# Patient Record
Sex: MALE | Race: WHITE | NOT HISPANIC OR LATINO | Employment: UNEMPLOYED | URBAN - METROPOLITAN AREA
[De-identification: names, ages, dates, MRNs, and addresses within clinical notes are randomized per-mention and may not be internally consistent; named-entity substitution may affect disease eponyms.]

---

## 2017-01-11 NOTE — PROGRESS NOTES
"  SUBJECTIVE:                                                    Dominic Workman is a 21 year old male who presents to clinic today for the following health issues:    Abnormal Mood Symptoms      Duration: 6-7 months ago    Description:  Depression: YES  Anxiety: YES  Panic attacks: no     Accompanying signs and symptoms: see PHQ-9 and ZOILA scores    History (similar episodes/previous evaluation): None    Precipitating or alleviating factors: None    Therapies tried and outcome: none     Patient is a senior at the [employer] with biology and Rusion major.  Mood symptom started in wilmer year in high school, but intensified in the past year.  His mothers are  and he has a twin sister who goes to [employer].  His stepmother passed away this past March and he was close with her.  Symptoms currently include apathy, less interest, intense sadness, low appetite, sleeping often , feeling hopeless.  Some anxiety symptoms, but manageable and focal around school and having a \"life plan.\"  Did have SI a couple months ago and would often go to [facility] with vague thoughts of being able to jump, but never intentions to jump and states that he \"knows deep down [I] would never do that.\"  He does not have this SI currently.  No feelings of grandiosity, needing less sleep, acting out of character, speaking more.  He lives in a house with 10 roommates and this is positive for him.  Family moved from [place] to the [facility] and he spends time with family - this has been positive.  Not much alcohol use, but does smoke 2-3 bowls of marijuana 3-4 times a week.      He started seeing a therapist - Cheikh Delgadillo at [facility] in [place] - 3 months ago and sees him every other week.  Therapist and he co-developed a plan to look into medication.  His sister and one mother both have depression and he knows that his sister has had good effect with prozac.  He is worried about side effects of weight gain and sexual " "side effects.      Questions/Concerns: none      Problem list and histories reviewed & adjusted, as indicated.  Additional history: as documented    Problem list, Medication list, Allergies, and Medical/Social/Surgical histories reviewed in [facility] and updated as appropriate.    ROS:  Constitutional, HEENT, cardiovascular, pulmonary, gi and gu systems are negative, except as otherwise noted.    OBJECTIVE:                                                    /61 mmHg  Pulse 70  Temp(Src) 96.3  F (35.7  C) (Oral)  Ht 5' 10.08\" (1.78 m)  Wt 181 lb 4.8 oz (82.237 kg)  BMI 25.96 kg/m2  SpO2 97%  Body mass index is 25.96 kg/(m^2).  GENERAL: healthy, alert and no distress  EYES: Eyes grossly normal to inspection, PERRL and conjunctivae and sclerae normal  HENT: ear canals and TM's normal, nose and mouth without ulcers or lesions  NECK: no adenopathy, no asymmetry, masses, or scars and thyroid normal to palpation  RESP: lungs clear to auscultation - no rales, rhonchi or wheezes  CV: regular rate and rhythm, normal S1 S2, no S3 or S4, no murmur, click or rub, no peripheral edema and peripheral pulses strong  ABDOMEN: soft, nontender, no hepatosplenomegaly, no masses and bowel sounds normal  NEURO: Normal strength and tone, mentation intact and speech normal  PSYCH: mentation appears normal, affect normal/bright    Diagnostic Test Results:  none    Please note greater than 50% of this 30 minute appointment were spent in counseling with the patient of the issues described above in the history of present illness and in the plan, including depression treatment options including medication, counseling, exercise, diet changes, meditation and supportive community, as well as side effects of medications.  ASSESSMENT/PLAN:                                                    Depression; recurrent episode-- Moderate   Associated with the following complications:    None   Plan:  Medications:   SSRI - initiate " SSRI  Referrals/Other:  Encourage regular exercise and Counseling recommended      (F33.1) Major depressive disorder, recurrent episode, moderate (H)  (primary encounter diagnosis)  Comment:   Plan: FLUoxetine (PROZAC) 20 MG capsule        Discussed medication options - worried about sexual side effects.  Depression and anhedonia much bigger factor than anxiety, so feel that the activating aspect of prozac will be more helpful.  Vitamin D, regular exercise, continue therapy.  Recheck in 4 weeks    (Z11.3) Routine screening for STI (sexually transmitted infection)  Comment:   Plan: Anti Treponema, Neisseria gonorrhoeae PCR,         Chlamydia trachomatis PCR, HIV Antigen Antibody        Combo              See Patient Instructions    ZARA Brand [facility]

## 2017-01-12 ENCOUNTER — OFFICE VISIT (OUTPATIENT)
Dept: FAMILY MEDICINE | Facility: CLINIC | Age: 22
End: 2017-01-12
Payer: COMMERCIAL

## 2017-01-12 VITALS
HEART RATE: 70 BPM | TEMPERATURE: 96.3 F | SYSTOLIC BLOOD PRESSURE: 146 MMHG | OXYGEN SATURATION: 97 % | DIASTOLIC BLOOD PRESSURE: 61 MMHG | WEIGHT: 181.3 LBS | HEIGHT: 70 IN | BODY MASS INDEX: 25.96 KG/M2

## 2017-01-12 DIAGNOSIS — Z11.3 ROUTINE SCREENING FOR STI (SEXUALLY TRANSMITTED INFECTION): ICD-10-CM

## 2017-01-12 DIAGNOSIS — F33.1 MAJOR DEPRESSIVE DISORDER, RECURRENT EPISODE, MODERATE (H): Primary | ICD-10-CM

## 2017-01-12 PROCEDURE — 99203 OFFICE O/P NEW LOW 30 MIN: CPT | Performed by: NURSE PRACTITIONER

## 2017-01-12 PROCEDURE — 36415 COLL VENOUS BLD VENIPUNCTURE: CPT | Performed by: NURSE PRACTITIONER

## 2017-01-12 PROCEDURE — 87491 CHLMYD TRACH DNA AMP PROBE: CPT | Performed by: NURSE PRACTITIONER

## 2017-01-12 PROCEDURE — 87591 N.GONORRHOEAE DNA AMP PROB: CPT | Performed by: NURSE PRACTITIONER

## 2017-01-12 PROCEDURE — 86780 TREPONEMA PALLIDUM: CPT | Performed by: NURSE PRACTITIONER

## 2017-01-12 PROCEDURE — 87389 HIV-1 AG W/HIV-1&-2 AB AG IA: CPT | Performed by: NURSE PRACTITIONER

## 2017-01-12 ASSESSMENT — ANXIETY QUESTIONNAIRES
2. NOT BEING ABLE TO STOP OR CONTROL WORRYING: SEVERAL DAYS
GAD7 TOTAL SCORE: 4
7. FEELING AFRAID AS IF SOMETHING AWFUL MIGHT HAPPEN: SEVERAL DAYS
3. WORRYING TOO MUCH ABOUT DIFFERENT THINGS: SEVERAL DAYS
6. BECOMING EASILY ANNOYED OR IRRITABLE: NOT AT ALL
IF YOU CHECKED OFF ANY PROBLEMS ON THIS QUESTIONNAIRE, HOW DIFFICULT HAVE THESE PROBLEMS MADE IT FOR YOU TO DO YOUR WORK, TAKE CARE OF THINGS AT HOME, OR GET ALONG WITH OTHER PEOPLE: SOMEWHAT DIFFICULT
5. BEING SO RESTLESS THAT IT IS HARD TO SIT STILL: NOT AT ALL
1. FEELING NERVOUS, ANXIOUS, OR ON EDGE: SEVERAL DAYS

## 2017-01-12 ASSESSMENT — PATIENT HEALTH QUESTIONNAIRE - PHQ9: 5. POOR APPETITE OR OVEREATING: NOT AT ALL

## 2017-01-12 NOTE — MR AVS SNAPSHOT
After Visit Summary   1/12/2017    Dominic Workman    MRN: [id number]           Patient Information     Date Of Birth          1995        Visit Information        Provider Department      1/12/2017 2:30 PM Julia Maurer APRN [facility]        Today's Diagnoses     Major depressive disorder, recurrent episode, moderate (H)    -  1     Routine screening for STI (sexually transmitted infection)           Care Instructions    Plan for depression and anxiety :    It is important to take care of yourself so you can heal and feel better.    - eat a balanced diet with lots of fruits, vegetables, protein, and whole grains   (organic best).  Try decreasing or avoiding sugars, trans fats, and white flour  -consider these supplements daily:    multiple vitamins or B complex with at least 50 mg of B6,    fish oil 1000 mg twice daily or 2 TBS ground flax seed meal    Vit D3 1887-2179 IU     Probiotics (healthy bacteria) especially if stomach or bowel symptoms   - exercise regularly with at least 30 minutes of movement daily or 5 hours per week  - sleep at least 8 hours per night   -practice meditation or relaxation daily   Yoga, Igor Chi, Qi Gong, sitting or walking meditation or   whatever you do that is meditative--that which empties the mind of worry   Sewing, reading, cooking, gardening, fishing, praying, etc  -consider starting or continuing in counseling or therapy   Contact your health insurance for resources  -seek support from friends and family and emmett communities   -other alternative therapies may also be of help such as acupuncture, homeopathy,  traditional chinese medicine, massage, etc      In addition to the above important treatments, antidepressant meds may be prescribed.   Drugs of the SSRI class can have side effects such as weight gain, sexual dysfunction, insomnia, headache, nausea, and initially increases in anxiety.  Let me know if significant side effects do  "occur.    Follow up in clinic in 4 weeks for recheck; sooner if symptoms should worsen.        Resources/Books:  Unstuck by Atif Mcgovern     The Chemistry of Anali  by Jaxon Ramirez     The Chemistry of Calm  by Jaxon Ramirez     Total Catastrophe Living by Aki Turner    Mindfulness-Based Stress Reduction Classes:    [facility]assionate [facility]  [email or web address]  [phone]    [facility]  [email or web address]    [email or web address]  [email or web address]    [facility] [place]  [email or web address]                Follow-ups after your visit        Who to contact     If you have questions or need follow up information about today's clinic visit or your schedule please contact [facility] directly at [phone].  Normal or non-critical lab and imaging results will be communicated to you by EngTechNowhart, letter or phone within 4 business days after the clinic has received the results. If you do not hear from us within 7 days, please contact the clinic through EngTechNowhart or phone. If you have a critical or abnormal lab result, we will notify you by phone as soon as possible.  Submit refill requests through BinWise or call your pharmacy and they will forward the refill request to us. Please allow 3 business days for your refill to be completed.          Additional Information About Your Visit        BinWise Information     BinWise lets you send messages to your doctor, view your test results, renew your prescriptions, schedule appointments and more. To sign up, go to [email or web address] . Click on \"Log in\" on the left side of the screen, which will take you to the Welcome page. Then click on \"Sign up Now\" on the right side of the page.     You will be asked to enter the access code listed below, as well as some personal information. Please follow the directions to create your username and password.     Your access code is: " "[email or web address]  Expires: 2017  3:27 PM     Your access code will  in 90 days. If you need help or a new code, please call your [facility] clinic or [phone].        Care EveryWhere ID     This is your Care EveryWhere ID. This could be used by other organizations to access your [facility] medical records  [id number]        Your Vitals Were     Pulse Temperature Height BMI (Body Mass Index) Pulse Oximetry       70 96.3  F (35.7  C) (Oral) 5' 10.08\" (1.78 m) 25.96 kg/m2 97%        Blood Pressure from Last 3 Encounters:   17 146/61    Weight from Last 3 Encounters:   17 181 lb 4.8 oz (82.237 kg)              We Performed the Following     Anti Treponema     Chlamydia trachomatis PCR     HIV Antigen Antibody Combo     Neisseria gonorrhoeae PCR          Today's Medication Changes          These changes are accurate as of: 17  3:27 PM.  If you have any questions, ask your nurse or doctor.               Start taking these medicines.        Dose/Directions    FLUoxetine 20 MG capsule   Commonly known as:  PROzac   Used for:  Major depressive disorder, recurrent episode, moderate (H)        Dose:  20 mg   Take 1 capsule (20 mg) by mouth daily   Quantity:  45 capsule   Refills:  1            Where to get your medicines      These medications were sent to [facility] [place], [place] - [address]  [address] [address]     Phone:  [phone]    - FLUoxetine 20 MG capsule             Primary Care Provider    None Specified       No primary provider on file.        Thank you!     Thank you for choosing [facility]  for your care. Our goal is always to provide you with excellent care. Hearing back from our patients is one way we can continue to improve our services. Please take a few minutes to complete the written survey that you may receive in the mail after your visit with us. Thank you!             Your Updated Medication List - Protect " others around you: Learn how to safely use, store and throw away your medicines at www.disposemymeds.org.          This list is accurate as of: 1/12/17  3:27 PM.  Always use your most recent med list.                   Brand Name Dispense Instructions for use    FLUoxetine 20 MG capsule    PROzac    45 capsule    Take 1 capsule (20 mg) by mouth daily

## 2017-01-12 NOTE — NURSING NOTE
"Chief Complaint   Patient presents with     Mental Health Problem       Initial /61 mmHg  Pulse 70  Temp(Src) 96.3  F (35.7  C) (Oral)  Ht 5' 10.08\" (1.78 m)  Wt 181 lb 4.8 oz (82.237 kg)  BMI 25.96 kg/m2  SpO2 97% Estimated body mass index is 25.96 kg/(m^2) as calculated from the following:    Height as of this encounter: 5' 10.08\" (1.78 m).    Weight as of this encounter: 181 lb 4.8 oz (82.237 kg).  BP completed using cuff size: darlin Sorenson MA      "

## 2017-01-12 NOTE — PATIENT INSTRUCTIONS
Plan for depression and anxiety :    It is important to take care of yourself so you can heal and feel better.    - eat a balanced diet with lots of fruits, vegetables, protein, and whole grains   (organic best).  Try decreasing or avoiding sugars, trans fats, and white flour  -consider these supplements daily:    multiple vitamins or B complex with at least 50 mg of B6,    fish oil 1000 mg twice daily or 2 TBS ground flax seed meal    Vit D3 4542-1991 IU     Probiotics (healthy bacteria) especially if stomach or bowel symptoms   - exercise regularly with at least 30 minutes of movement daily or 5 hours per week  - sleep at least 8 hours per night   -practice meditation or relaxation daily   Yoga, Igor Chi, Qi Gong, sitting or walking meditation or   whatever you do that is meditative--that which empties the mind of worry   Sewing, reading, cooking, gardening, fishing, praying, etc  -consider starting or continuing in counseling or therapy   Contact your health insurance for resources  -seek support from friends and family and emmett communities   -other alternative therapies may also be of help such as acupuncture, homeopathy,  traditional chinese medicine, massage, etc      In addition to the above important treatments, antidepressant meds may be prescribed.   Drugs of the SSRI class can have side effects such as weight gain, sexual dysfunction, insomnia, headache, nausea, and initially increases in anxiety.  Let me know if significant side effects do occur.    Follow up in clinic in 4 weeks for recheck; sooner if symptoms should worsen.        Resources/Books:  Unstuck by Atif Mcgovern     The Chemistry of Anali  by Jaxon Ramirez     The Chemistry of Calm  by Jaxon Ramirez     Total Catastrophe Living by Aki Turner    Mindfulness-Based Stress Reduction Classes:    [facility]ionate [facility]  [email or web address]  [phone]    [facility]  [email or web address]    [facility]  [email or web address]    [facility] [place]  [email or web address]

## 2017-01-13 LAB
HIV 1+2 AB+HIV1 P24 AG SERPL QL IA: NORMAL
T PALLIDUM IGG+IGM SER QL: NEGATIVE

## 2017-01-13 ASSESSMENT — PATIENT HEALTH QUESTIONNAIRE - PHQ9: SUM OF ALL RESPONSES TO PHQ QUESTIONS 1-9: 15

## 2017-01-13 ASSESSMENT — ANXIETY QUESTIONNAIRES: GAD7 TOTAL SCORE: 4

## 2017-01-15 LAB
C TRACH DNA SPEC QL NAA+PROBE: NORMAL
N GONORRHOEA DNA SPEC QL NAA+PROBE: NORMAL
SPECIMEN SOURCE: NORMAL
SPECIMEN SOURCE: NORMAL

## 2017-01-17 NOTE — PROGRESS NOTES
Quick Note:    Dominic,    Your labs were all normal. If you have any questions, please feel free to contact the clinic.    JACKIE Steinberg  ______

## 2017-02-14 NOTE — PROGRESS NOTES
SUBJECTIVE:                                                    Dominic Workman is a 21 year old male who presents to clinic today for the following health issues:    Depression and Anxiety Follow-Up    Status since last visit: better initially, then worsened    Other associated symptoms: trouble sleeping, more emotional, increase in migraines    Therapy once every two weeks    Complicating factors:     Significant life event: No     Current substance abuse: None    PHQ-9 SCORE 1/12/2017 2/15/2017   Total Score 15 17     ZOILA-7 SCORE 1/12/2017 2/15/2017   Total Score 4 4        PHQ-9  English      PHQ-9   Any Language     GAD7       Amount of exercise or physical activity: 2-3 days/week for an average of greater than 60 minutes    Problems taking medications regularly: No    Medication side effects: migraines    Diet: regular (no restrictions)      Problem list and histories reviewed & adjusted, as indicated.  Additional history: as documented    Problem list, Medication list, Allergies, and Medical/Social/Surgical histories reviewed in [facility] and updated as appropriate.    ROS:  Constitutional, HEENT, cardiovascular, pulmonary, gi and gu systems are negative, except as otherwise noted.    OBJECTIVE:                                                    /79 (BP Location: Left arm, Patient Position: Chair, Cuff Size: Adult Regular)  Pulse 78  Temp 98.3  F (36.8  C) (Oral)  Wt 172 lb (78 kg)  SpO2 96%  BMI 24.62 kg/m2  Body mass index is 24.62 kg/(m^2).  GENERAL: healthy, alert and no distress  NECK: no adenopathy, no asymmetry, masses, or scars and thyroid normal to palpation  RESP: lungs clear to auscultation - no rales, rhonchi or wheezes  CV: regular rate and rhythm, normal S1 S2, no S3 or S4, no murmur, click or rub, no peripheral edema and peripheral pulses strong  ABDOMEN: soft, nontender, no hepatosplenomegaly, no masses and bowel sounds normal  NEURO: Normal strength and tone, mentation intact and speech  normal  PSYCH: mentation appears normal, affect normal/bright    Diagnostic Test Results:  none      ASSESSMENT/PLAN:                                                    Depression; recurrent episode-- Moderate   Associated with the following complications:    None   Plan:  Medications:   SSRI - change SSRI      (F33.1) Major depressive disorder, recurrent episode, moderate (H)  (primary encounter diagnosis)  Comment:   Plan: citalopram (CELEXA) 20 MG tablet              Patient Instructions   Stop prozac  Start celexa (citalopram) 10 mg, then up to 20 mg in one week  Return in one month  Continue therapy  Increase exercise to once a day  Continue vitamin D      ZARA Brand [facility]

## 2017-02-15 ENCOUNTER — OFFICE VISIT (OUTPATIENT)
Dept: FAMILY MEDICINE | Facility: CLINIC | Age: 22
End: 2017-02-15
Payer: COMMERCIAL

## 2017-02-15 VITALS
SYSTOLIC BLOOD PRESSURE: 131 MMHG | DIASTOLIC BLOOD PRESSURE: 79 MMHG | BODY MASS INDEX: 24.62 KG/M2 | WEIGHT: 172 LBS | TEMPERATURE: 98.3 F | OXYGEN SATURATION: 96 % | HEART RATE: 78 BPM

## 2017-02-15 DIAGNOSIS — F33.1 MAJOR DEPRESSIVE DISORDER, RECURRENT EPISODE, MODERATE (H): Primary | ICD-10-CM

## 2017-02-15 PROCEDURE — 99214 OFFICE O/P EST MOD 30 MIN: CPT | Performed by: NURSE PRACTITIONER

## 2017-02-15 RX ORDER — CITALOPRAM HYDROBROMIDE 20 MG/1
TABLET ORAL
Qty: 60 TABLET | Refills: 0 | Status: SHIPPED | OUTPATIENT
Start: 2017-02-15 | End: 2017-12-22

## 2017-02-15 ASSESSMENT — ANXIETY QUESTIONNAIRES
7. FEELING AFRAID AS IF SOMETHING AWFUL MIGHT HAPPEN: NOT AT ALL
1. FEELING NERVOUS, ANXIOUS, OR ON EDGE: SEVERAL DAYS
GAD7 TOTAL SCORE: 4
5. BEING SO RESTLESS THAT IT IS HARD TO SIT STILL: NOT AT ALL
3. WORRYING TOO MUCH ABOUT DIFFERENT THINGS: SEVERAL DAYS
2. NOT BEING ABLE TO STOP OR CONTROL WORRYING: SEVERAL DAYS
6. BECOMING EASILY ANNOYED OR IRRITABLE: SEVERAL DAYS

## 2017-02-15 ASSESSMENT — PATIENT HEALTH QUESTIONNAIRE - PHQ9: 5. POOR APPETITE OR OVEREATING: NOT AT ALL

## 2017-02-15 NOTE — MR AVS SNAPSHOT
After Visit Summary   2/15/2017    Dominic Workman    MRN: [id number]           Patient Information     Date Of Birth          1995        Visit Information        Provider Department      2/15/2017 4:00 PM Julia Maurer APRN CNP [facility]        Today's Diagnoses     Major depressive disorder, recurrent episode, moderate (H)    -  1      Care Instructions    Stop prozac  Start celexa (citalopram) 10 mg, then up to 20 mg in one week  Return in one month  Continue therapy  Increase exercise to once a day  Continue vitamin D        Follow-ups after your visit        Who to contact     If you have questions or need follow up information about today's clinic visit or your schedule please contact [facility] directly at [phone].  Normal or non-critical lab and imaging results will be communicated to you by DigitalOceanhart, letter or phone within 4 business days after the clinic has received the results. If you do not hear from us within 7 days, please contact the clinic through DigitalOceanhart or phone. If you have a critical or abnormal lab result, we will notify you by phone as soon as possible.  Submit refill requests through NantWorks or call your pharmacy and they will forward the refill request to us. Please allow 3 business days for your refill to be completed.          Additional Information About Your Visit        MyChart Information     NantWorks gives you secure access to your electronic health record. If you see a primary care provider, you can also send messages to your care team and make appointments. If you have questions, please call your primary care clinic.  If you do not have a primary care provider, please call [phone] and they will assist you.        Care EveryWhere ID     This is your Care EveryWhere ID. This could be used by other organizations to access your [facility] medical records  [id number]        Your Vitals Were     Pulse Temperature Pulse  Oximetry BMI (Body Mass Index)          78 98.3  F (36.8  C) (Oral) 96% 24.62 kg/m2         Blood Pressure from Last 3 Encounters:   02/15/17 131/79   01/12/17 146/61    Weight from Last 3 Encounters:   02/15/17 172 lb (78 kg)   01/12/17 181 lb 4.8 oz (82.2 kg)              Today, you had the following     No orders found for display         Today's Medication Changes          These changes are accurate as of: 2/15/17  5:06 PM.  If you have any questions, ask your nurse or doctor.               Start taking these medicines.        Dose/Directions    citalopram 20 MG tablet   Commonly known as:  celeXA   Used for:  Major depressive disorder, recurrent episode, moderate (H)   Started by:  Julia Maurer APRN CNP        Take 1/2 tablet (10 mg) for 1 week, then increase to 1 tablet orally daily   Quantity:  60 tablet   Refills:  0         Stop taking these medicines if you haven't already. Please contact your care team if you have questions.     FLUoxetine 20 MG capsule   Commonly known as:  PROzac   Stopped by:  Julia Maurer APRN CNP                Where to get your medicines      These medications were sent to [facility] [place], [place] - [address]  [address] [address]     Phone:  [phone]     citalopram 20 MG tablet                Primary Care Provider    None Specified       No primary provider on file.        Thank you!     Thank you for choosing [facility]  for your care. Our goal is always to provide you with excellent care. Hearing back from our patients is one way we can continue to improve our services. Please take a few minutes to complete the written survey that you may receive in the mail after your visit with us. Thank you!             Your Updated Medication List - Protect others around you: Learn how to safely use, store and throw away your medicines at www.disposemymeds.org.          This list is accurate as of: 2/15/17  5:06 PM.   Always use your most recent med list.                   Brand Name Dispense Instructions for use    citalopram 20 MG tablet    celeXA    60 tablet    Take 1/2 tablet (10 mg) for 1 week, then increase to 1 tablet orally daily

## 2017-02-15 NOTE — NURSING NOTE
"Chief Complaint   Patient presents with     Anxiety     Depression       Initial /79 (BP Location: Left arm, Patient Position: Chair, Cuff Size: Adult Regular)  Pulse 78  Temp 98.3  F (36.8  C) (Oral)  Wt 172 lb (78 kg)  SpO2 96%  BMI 24.62 kg/m2 Estimated body mass index is 24.62 kg/(m^2) as calculated from the following:    Height as of 1/12/17: 5' 10.08\" (1.78 m).    Weight as of this encounter: 172 lb (78 kg).    Yun Gunter MA      "

## 2017-02-15 NOTE — PATIENT INSTRUCTIONS
Stop prozac  Start celexa (citalopram) 10 mg, then up to 20 mg in one week  Return in one month  Continue therapy  Increase exercise to once a day  Continue vitamin D

## 2017-02-16 ASSESSMENT — PATIENT HEALTH QUESTIONNAIRE - PHQ9: SUM OF ALL RESPONSES TO PHQ QUESTIONS 1-9: 17

## 2017-02-16 ASSESSMENT — ANXIETY QUESTIONNAIRES: GAD7 TOTAL SCORE: 4

## 2017-08-14 ENCOUNTER — TELEPHONE (OUTPATIENT)
Dept: FAMILY MEDICINE | Facility: CLINIC | Age: 22
End: 2017-08-14

## 2017-08-14 NOTE — LETTER
Dominic Workman  [address] [place] [address]        August 14, 2017          Dear Dominic,    In order to ensure we are providing the best quality care, we have reviewed your chart and see that you are due for:    1. Depression follow up    Please call the clinic at your earliest convenience to schedule an appointment.  If you have completed these please contact our office via phone or Mychart to update our records.  We would like to know the date (approximately month and year), the result, and ideally where the procedure was performed.    Thank you for trusting us with your health care.      Sincerely,    Care Team for JACKIE Steinberg

## 2017-08-14 NOTE — TELEPHONE ENCOUNTER
Panel Management Review      Patient has the following on his problem list:     Depression / Dysthymia review  PHQ-9 SCORE 1/12/2017 2/15/2017   Total Score 15 17      Patient is due for:  PHQ9        Composite cancer screening  Chart review shows that this patient is due/due soon for the following None  Summary:    Patient is due/failing the following:   PHQ9    Action needed:   Patient needs office visit for depression follow up.    Type of outreach:    Phone, left message for patient to call back. , Sent NextWidgetst message. and Sent letter.    Questions for provider review:    None                                                                                                                                    Tato Sorenson MA     Chart routed to none.

## 2017-10-18 ENCOUNTER — OFFICE VISIT (OUTPATIENT)
Dept: FAMILY MEDICINE | Facility: CLINIC | Age: 22
End: 2017-10-18
Payer: COMMERCIAL

## 2017-10-18 VITALS
SYSTOLIC BLOOD PRESSURE: 132 MMHG | DIASTOLIC BLOOD PRESSURE: 78 MMHG | BODY MASS INDEX: 26.64 KG/M2 | TEMPERATURE: 97.2 F | HEART RATE: 62 BPM | WEIGHT: 186.1 LBS | OXYGEN SATURATION: 96 %

## 2017-10-18 DIAGNOSIS — F33.1 MAJOR DEPRESSIVE DISORDER, RECURRENT EPISODE, MODERATE (H): Primary | ICD-10-CM

## 2017-10-18 DIAGNOSIS — F41.1 GAD (GENERALIZED ANXIETY DISORDER): ICD-10-CM

## 2017-10-18 PROCEDURE — 99214 OFFICE O/P EST MOD 30 MIN: CPT | Performed by: NURSE PRACTITIONER

## 2017-10-18 ASSESSMENT — ANXIETY QUESTIONNAIRES
GAD7 TOTAL SCORE: 15
7. FEELING AFRAID AS IF SOMETHING AWFUL MIGHT HAPPEN: MORE THAN HALF THE DAYS
3. WORRYING TOO MUCH ABOUT DIFFERENT THINGS: NEARLY EVERY DAY
IF YOU CHECKED OFF ANY PROBLEMS ON THIS QUESTIONNAIRE, HOW DIFFICULT HAVE THESE PROBLEMS MADE IT FOR YOU TO DO YOUR WORK, TAKE CARE OF THINGS AT HOME, OR GET ALONG WITH OTHER PEOPLE: SOMEWHAT DIFFICULT
5. BEING SO RESTLESS THAT IT IS HARD TO SIT STILL: SEVERAL DAYS
6. BECOMING EASILY ANNOYED OR IRRITABLE: SEVERAL DAYS
1. FEELING NERVOUS, ANXIOUS, OR ON EDGE: NEARLY EVERY DAY
2. NOT BEING ABLE TO STOP OR CONTROL WORRYING: NEARLY EVERY DAY

## 2017-10-18 ASSESSMENT — PATIENT HEALTH QUESTIONNAIRE - PHQ9
SUM OF ALL RESPONSES TO PHQ QUESTIONS 1-9: 19
5. POOR APPETITE OR OVEREATING: MORE THAN HALF THE DAYS

## 2017-10-18 NOTE — NURSING NOTE
"Chief Complaint   Patient presents with     Depression     Anxiety       Initial /78  Pulse 62  Temp 97.2  F (36.2  C) (Oral)  Wt 186 lb 1.6 oz (84.4 kg)  SpO2 96%  BMI 26.64 kg/m2 Estimated body mass index is 26.64 kg/(m^2) as calculated from the following:    Height as of 1/12/17: 5' 10.08\" (1.78 m).    Weight as of this encounter: 186 lb 1.6 oz (84.4 kg).  Medication Reconciliation: complete       Tato Sorenson MA      "

## 2017-10-18 NOTE — MR AVS SNAPSHOT
After Visit Summary   10/18/2017    Dominic Workman    MRN: [id number]           Patient Information     Date Of Birth          1995        Visit Information        Provider Department      10/18/2017 4:15 PM Julia Maurer APRN [facility]        Today's Diagnoses     Major depressive disorder, recurrent episode, moderate (H)    -  1    ZOILA (generalized anxiety disorder)          Care Instructions    Start zoloft 25 mg (1/2 tab) for 1-2 weeks, then increase to 5o mg (1 tab)  Increase exercise  Change therapy to weekly for the time being  Follow-up sooner if more intrusive thoughts  Behavior emergency room if suicidal intentions  Return in four weeks    - eat a balanced diet with lots of fruits, vegetables, protein, and whole grains   (organic best).  Try decreasing or avoiding sugars, trans fats, and white flour  -consider these supplements daily:    multiple vitamins or B complex with at least 50 mg of B6,    fish oil 1000 mg twice daily or 2 TBS ground flax seed meal    Vit D3 5589-6822 IU     Probiotics (healthy bacteria) especially if stomach or bowel symptoms   - exercise regularly with at least 30 minutes of movement daily or 5 hours per week  - sleep at least 8 hours per night    If having difficulty getting to sleep, try Magnesium glycinate 400-600 mg or    melatonin 1-3 mg in the evening    If having difficulty staying asleep, try L-theanine 100-200 mg at bedtime or   Passionflower tincture, tea or capsule  -practice meditation or relaxation daily   Yoga, Igor Chi, Qi Gong, sitting or walking meditation or   whatever you do that is meditative--that which empties the mind of worry   Sewing, reading, cooking, gardening, fishing, praying, etc  -consider starting or continuing in counseling or therapy   Contact your health insurance for resources  -seek support from friends and family and emmett communities   -other alternative therapies may also be of help such as  acupuncture, homeopathy,  traditional chinese medicine, massage, etc      In addition to the above important treatments, antidepressant meds may be prescribed.   Drugs of the SSRI class can have side effects such as weight gain, sexual dysfunction, insomnia, headache, nausea, and initially increases in anxiety.  Let me know if significant side effects do occur.    Follow up in clinic in 4 weeks for recheck; sooner if symptoms should worsen.        Resources/Books:  Unstuck by Atif Mcgovern     The Chemistry of Anali  by Jaxon Ramirez     The Chemistry of Calm  by Jaxon Ramirez     Total Catastrophe Living by Aki Turner    Mindfulness-Based Stress Reduction Classes:    [facility] [place] [facility] [place]  [email or web address]  [phone]    [facility]  [email or web address]    [facility]  [email or web address]    [facility] [place]  [email or web address]                  Follow-ups after your visit        Who to contact     If you have questions or need follow up information about today's clinic visit or your schedule please contact [facility] directly at [phone].  Normal or non-critical lab and imaging results will be communicated to you by Zopahart, letter or phone within 4 business days after the clinic has received the results. If you do not hear from us within 7 days, please contact the clinic through DoublePlay Entertainmentt or phone. If you have a critical or abnormal lab result, we will notify you by phone as soon as possible.  Submit refill requests through [employer] or call your pharmacy and they will forward the refill request to us. Please allow 3 business days for your refill to be completed.          Additional Information About Your Visit        [employer] Information     [employer] gives you secure access to your electronic health record. If you see a primary care provider, you can also send messages to your care team and make appointments. If you  have questions, please call your primary care clinic.  If you do not have a primary care provider, please call [phone] and they will assist you.        Care EveryWhere ID     This is your Care EveryWhere ID. This could be used by other organizations to access your [facility] medical records  [id number]        Your Vitals Were     Pulse Temperature Pulse Oximetry BMI (Body Mass Index)          62 97.2  F (36.2  C) (Oral) 96% 26.64 kg/m2         Blood Pressure from Last 3 Encounters:   10/18/17 132/78   02/15/17 131/79   01/12/17 146/61    Weight from Last 3 Encounters:   10/18/17 186 lb 1.6 oz (84.4 kg)   02/15/17 172 lb (78 kg)   01/12/17 181 lb 4.8 oz (82.2 kg)              Today, you had the following     No orders found for display         Today's Medication Changes          These changes are accurate as of: 10/18/17  5:11 PM.  If you have any questions, ask your nurse or doctor.               Start taking these medicines.        Dose/Directions    sertraline 50 MG tablet   Commonly known as:  ZOLOFT   Used for:  Major depressive disorder, recurrent episode, moderate (H), ZOILA (generalized anxiety disorder)   Started by:  Julia Maurer APRN CNP        Take 1/2 tablet (25 mg) for 1-2 weeks, then increase to 1 tablet orally daily   Quantity:  45 tablet   Refills:  0            Where to get your medicines      These medications were sent to [facility] [place] [address]  [address], [facility] [address]     Phone:  [phone]     sertraline 50 MG tablet                Primary Care Provider Office Phone # Fax #    ZARA Grimes [phone]       [facility] [address]  [facility] [address]        Equal Access to Services     DOUGIE SANCHEZ AH: Bao Smith, wanelyda luqadaha, qaybta kaalmada miriam, estrada moon. So [facility] [phone].    ATENCIÓN: Si jean claude johnston  disposición servicios gratuitos de asistencia lingüística. Myrna cruz [phone].    We comply with applicable federal civil rights laws and [place] laws. We do not discriminate on the basis of race, color, national origin, age, disability, sex, sexual orientation, or gender identity.            Thank you!     Thank you for choosing [facility]  for your care. Our goal is always to provide you with excellent care. Hearing back from our patients is one way we can continue to improve our services. Please take a few minutes to complete the written survey that you may receive in the mail after your visit with us. Thank you!             Your Updated Medication List - Protect others around you: Learn how to safely use, store and throw away your medicines at www.disposemymeds.org.          This list is accurate as of: 10/18/17  5:11 PM.  Always use your most recent med list.                   Brand Name Dispense Instructions for use Diagnosis    citalopram 20 MG tablet    celeXA    60 tablet    Take 1/2 tablet (10 mg) for 1 week, then increase to 1 tablet orally daily    Major depressive disorder, recurrent episode, moderate (H)       sertraline 50 MG tablet    ZOLOFT    45 tablet    Take 1/2 tablet (25 mg) for 1-2 weeks, then increase to 1 tablet orally daily    Major depressive disorder, recurrent episode, moderate (H), ZOILA (generalized anxiety disorder)

## 2017-10-18 NOTE — PROGRESS NOTES
"  SUBJECTIVE:   Dominic Workman is a 22 year old male who presents to clinic today for the following health issues:    Depression and Anxiety Follow-Up    Status since last visit: Worsened     Had started celexa in February - seemed to work, but then started to have side effects - cannot recall exactly what, but remembers legs felt restless and was having trouble sleeping; felt like he didn't need medication and was feeling better.  Thinks he took it for four weeks.  Prozac had caused migraines    Other associated symptoms: having more anxiety than normal    Seeing therapist biweekly    Senior at [employer]    Taking MVI and vitamin D    Suicidal thoughts without any intention or plan    Physical symptoms include: headaches have been \"manageable\" - taking ibuprofen 600 mg once a week, nausea in the mornings, some diarrhea    Complicating factors:     Significant life event: No     Current substance abuse: None    PHQ-9 Score and MyChart F/U Questions 1/12/2017 2/15/2017   Total Score 15 17   Q9: Suicide Ideation Not at all Several days     ZOILA-7 SCORE 1/12/2017 2/15/2017   Total Score 4 4     In the past two weeks have you had thoughts of suicide or self-harm?  No.    Do you have concerns about your personal safety or the safety of others?   No    PHQ-9  English  PHQ-9   Any Language  GAD7  Suicide Assessment Five-step Evaluation and Treatment (SAFE-T)      Amount of exercise or physical activity: 6-7 days/week for an average of 15-30 minutes    Problems taking medications regularly: No    Medication side effects: none    Diet: regular (no restrictions)    Questions/Concerns: Has paperwork to fill out for school.    Problem list and histories reviewed & adjusted, as indicated.  Additional history: as documented    Reviewed and updated as needed this visit by clinical staff     Reviewed and updated as needed this visit by Provider       ROS:  Constitutional, HEENT, cardiovascular, pulmonary, gi and gu systems are " negative, except as otherwise noted.      OBJECTIVE:   /78  Pulse 62  Temp 97.2  F (36.2  C) (Oral)  Wt 186 lb 1.6 oz (84.4 kg)  SpO2 96%  BMI 26.64 kg/m2  Body mass index is 26.64 kg/(m^2).  GENERAL: healthy, alert and no distress  NECK: no adenopathy, no asymmetry, masses, or scars and thyroid normal to palpation  RESP: lungs clear to auscultation - no rales, rhonchi or wheezes  CV: regular rate and rhythm, normal S1 S2, no S3 or S4, no murmur, click or rub, no peripheral edema and peripheral pulses strong  PSYCH: mentation appears normal, affect normal/bright    Diagnostic Test Results:  none     ASSESSMENT/PLAN:     Depression; recurrent episode-- Moderate   Associated with the following complications:    None   Plan:  Medications:   SSRI - restart      (F33.1) Major depressive disorder, recurrent episode, moderate (H)  (primary encounter diagnosis)  Comment:   Plan: sertraline (ZOLOFT) 50 MG tablet            (F41.1) ZOILA (generalized anxiety disorder)  Comment:   Plan: sertraline (ZOLOFT) 50 MG tablet              Patient Instructions   Start zoloft 25 mg (1/2 tab) for 1-2 weeks, then increase to 5o mg (1 tab)  Increase exercise  Change therapy to weekly for the time being  Follow-up sooner if more intrusive thoughts  Behavior emergency room if suicidal intentions  Return in four weeks    - eat a balanced diet with lots of fruits, vegetables, protein, and whole grains   (organic best).  Try decreasing or avoiding sugars, trans fats, and white flour  -consider these supplements daily:    multiple vitamins or B complex with at least 50 mg of B6,    fish oil 1000 mg twice daily or 2 TBS ground flax seed meal    Vit D3 7632-3402 IU     Probiotics (healthy bacteria) especially if stomach or bowel symptoms   - exercise regularly with at least 30 minutes of movement daily or 5 hours per week  - sleep at least 8 hours per night    If having difficulty getting to sleep, try Magnesium glycinate 400-600 mg  or    melatonin 1-3 mg in the evening    If having difficulty staying asleep, try L-theanine 100-200 mg at bedtime or   Passionflower tincture, tea or capsule  -practice meditation or relaxation daily   Yoga, Igor Chi, Qi Gong, sitting or walking meditation or   whatever you do that is meditative--that which empties the mind of worry   Sewing, reading, cooking, gardening, fishing, praying, etc  -consider starting or continuing in counseling or therapy   Contact your health insurance for resources  -seek support from friends and family and emmett communities   -other alternative therapies may also be of help such as acupuncture, homeopathy,  traditional chinese medicine, massage, etc      In addition to the above important treatments, antidepressant meds may be prescribed.   Drugs of the SSRI class can have side effects such as weight gain, sexual dysfunction, insomnia, headache, nausea, and initially increases in anxiety.  Let me know if significant side effects do occur.    Follow up in clinic in 4 weeks for recheck; sooner if symptoms should worsen.        Resources/Books:  Unstuck by Atif Mcgovern     The Chemistry of Anali  by Jaxon Ramirez     The Chemistry of Calm  by Jaxon Ramirez     Total Catastrophe Living by Aki Turner    Mindfulness-Based Stress Reduction Classes:    [facility]  www.oceanarma.org  [phone]    [facility]  [email or web address]    [facility]  [email or web address]    [facility] [place]  www.[email or web address]              ZARA Brand [facility]

## 2017-10-18 NOTE — PATIENT INSTRUCTIONS
Start zoloft 25 mg (1/2 tab) for 1-2 weeks, then increase to 5o mg (1 tab)  Increase exercise  Change therapy to weekly for the time being  Follow-up sooner if more intrusive thoughts  Behavior emergency room if suicidal intentions  Return in four weeks    - eat a balanced diet with lots of fruits, vegetables, protein, and whole grains   (organic best).  Try decreasing or avoiding sugars, trans fats, and white flour  -consider these supplements daily:    multiple vitamins or B complex with at least 50 mg of B6,    fish oil 1000 mg twice daily or 2 TBS ground flax seed meal    Vit D3 6449-1858 IU     Probiotics (healthy bacteria) especially if stomach or bowel symptoms   - exercise regularly with at least 30 minutes of movement daily or 5 hours per week  - sleep at least 8 hours per night    If having difficulty getting to sleep, try Magnesium glycinate 400-600 mg or    melatonin 1-3 mg in the evening    If having difficulty staying asleep, try L-theanine 100-200 mg at bedtime or   Passionflower tincture, tea or capsule  -practice meditation or relaxation daily   Yoga, Igor Chi, Qi Gong, sitting or walking meditation or   whatever you do that is meditative--that which empties the mind of worry   Sewing, reading, cooking, gardening, fishing, praying, etc  -consider starting or continuing in counseling or therapy   Contact your health insurance for resources  -seek support from friends and family and emmett communities   -other alternative therapies may also be of help such as acupuncture, homeopathy,  traditional chinese medicine, massage, etc      In addition to the above important treatments, antidepressant meds may be prescribed.   Drugs of the SSRI class can have side effects such as weight gain, sexual dysfunction, insomnia, headache, nausea, and initially increases in anxiety.  Let me know if significant side effects do occur.    Follow up in clinic in 4 weeks for recheck; sooner if symptoms should worsen.         Resources/Books:  Unstuck by Atif Mcgovern     The Chemistry of Anali  by Jaxon Ramirez     The Chemistry of Calm  by Jaxon Ramirez     Total Catastrophe Living by Aki Turner    Mindfulness-Based Stress Reduction Classes:    [facility]ionate [facility]  www.[facility][email or web address]].org  [phone]    [facility]  [email or web address]    [facility]  www.[email or web address].org    [facility] [place]  www.[facility]

## 2017-10-19 ASSESSMENT — ANXIETY QUESTIONNAIRES: GAD7 TOTAL SCORE: 15

## 2017-12-06 ENCOUNTER — TELEPHONE (OUTPATIENT)
Dept: FAMILY MEDICINE | Facility: CLINIC | Age: 22
End: 2017-12-06

## 2017-12-07 NOTE — TELEPHONE ENCOUNTER
Left a message with request for return call    DIANA DunawayN RN  [facility]

## 2017-12-07 NOTE — TELEPHONE ENCOUNTER
Overdue for follow-up.  Please help him schedule and see if he needs a short refill to make it to appt.  JACKIE Steinberg

## 2017-12-12 NOTE — TELEPHONE ENCOUNTER
Called patient, he scheduled an appointment on 12/22/2017. He states that he does not need a refill of medication at this time.     Berenice Cisneros RN  [facility]

## 2017-12-21 NOTE — PROGRESS NOTES
SUBJECTIVE:   Dominic Workman is a 22 year old male who presents to clinic today for the following health issues:    Depression Followup    Status since last visit: Stable     Zoloft was working way better than any other medication.  Still having sexual side effects - difficulty with ejaculation and low libido.  Especially better for anxiety, depression still not adequately treated.  Did not refill and so has been off completely about 3-4 weeks.      Celexa, Zoloft and Prozac - all had sexual side effects    See PHQ-9 for current symptoms.  Other associated symptoms: trouble sleeping, sleeping too much, low appetite, low energy.     Complicating factors:   Significant life event:  No   Current substance abuse:  None  Anxiety or Panic symptoms:  Yes-  Anxiety attacks     PHQ-9 Score and MyChart F/U Questions 1/12/2017 2/15/2017 10/18/2017   Total Score 15 17 19   Q9: Suicide Ideation Not at all Several days Several days     In the past two weeks have you had thoughts of suicide or self-harm?  Yes  In the past two weeks have you thought of a plan or intent to harm yourself? No.  Do you have concerns about your personal safety or the safety of others?   No      PHQ-9  English  PHQ-9   Any Language  Suicide Assessment Five-step Evaluation and Treatment (SAFE-T)      Amount of exercise or physical activity: None    Problems taking medications regularly: No    Medication side effects: none    Diet: regular (no restrictions)    Questions/Concerns: none    Problem list and histories reviewed & adjusted, as indicated.  Additional history: as documented    Reviewed and updated as needed this visit by clinical staff     Reviewed and updated as needed this visit by Provider         ROS:  Constitutional, HEENT, cardiovascular, pulmonary, gi and gu systems are negative, except as otherwise noted.      OBJECTIVE:   /86  Pulse 65  Temp 97.4  F (36.3  C) (Oral)  Wt 186 lb 9.6 oz (84.6 kg)  SpO2 97%  BMI 26.71  kg/m2  Body mass index is 26.71 kg/(m^2).  GENERAL: healthy, alert and no distress  NECK: no adenopathy, no asymmetry, masses, or scars and thyroid normal to palpation  RESP: lungs clear to auscultation - no rales, rhonchi or wheezes  CV: regular rate and rhythm, normal S1 S2, no S3 or S4, no murmur, click or rub, no peripheral edema and peripheral pulses strong  PSYCH: mentation appears normal, affect normal/bright    Diagnostic Test Results:  none     ASSESSMENT/PLAN:     Depression; recurrent episode-- Moderate   Associated with the following complications:    None   Plan:  Medications:   SSRI - restart  Bupropion- start  Referrals/Other:  MTM referral      (F33.1) Major depressive disorder, recurrent episode, moderate (H)  Comment:   Plan: sertraline (ZOLOFT) 50 MG tablet, buPROPion         (WELLBUTRIN XL) 150 MG 24 hr tablet, MED         THERAPY MANAGE REFERRAL    Possibly would do better on newer medications with reported less sexual side effects such as viibryd, but need expert guidance from psych MTM.  At this time, patient does want to start therapy right away so we will restart zoloft and add wellbutrin.  Follow-up with MTM within the month and with me in 3 months.  MTM - Cristy or     (F41.1) ZOILA (generalized anxiety disorder)  Comment:   Plan: sertraline (ZOLOFT) 50 MG tablet, buPROPion         (WELLBUTRIN XL) 150 MG 24 hr tablet, MED         THERAPY MANAGE REFERRAL              Patient Instructions   Restart zoloft 25 mg and taper up to 50 mg (1 full tab0 over a week  Also start Wellbutrin  mg    Schedule with MTM (pharmacist - Alysha or Alessia) for approx 1 month  Follow-up with me in a month if you can't get in with them  OR if you do get in with them, in three months      ZARA Brand [facility]

## 2017-12-22 ENCOUNTER — OFFICE VISIT (OUTPATIENT)
Dept: FAMILY MEDICINE | Facility: CLINIC | Age: 22
End: 2017-12-22
Payer: COMMERCIAL

## 2017-12-22 VITALS
HEART RATE: 65 BPM | WEIGHT: 186.6 LBS | DIASTOLIC BLOOD PRESSURE: 86 MMHG | TEMPERATURE: 97.4 F | SYSTOLIC BLOOD PRESSURE: 142 MMHG | BODY MASS INDEX: 26.71 KG/M2 | OXYGEN SATURATION: 97 %

## 2017-12-22 DIAGNOSIS — F33.1 MAJOR DEPRESSIVE DISORDER, RECURRENT EPISODE, MODERATE (H): ICD-10-CM

## 2017-12-22 DIAGNOSIS — F41.1 GAD (GENERALIZED ANXIETY DISORDER): ICD-10-CM

## 2017-12-22 PROCEDURE — 99214 OFFICE O/P EST MOD 30 MIN: CPT | Performed by: NURSE PRACTITIONER

## 2017-12-22 RX ORDER — BUPROPION HYDROCHLORIDE 150 MG/1
150 TABLET ORAL EVERY MORNING
Qty: 30 TABLET | Refills: 1 | Status: SHIPPED | OUTPATIENT
Start: 2017-12-22 | End: 2018-02-21

## 2017-12-22 ASSESSMENT — PATIENT HEALTH QUESTIONNAIRE - PHQ9: SUM OF ALL RESPONSES TO PHQ QUESTIONS 1-9: 16

## 2017-12-22 NOTE — NURSING NOTE
"Chief Complaint   Patient presents with     Depression       Initial /86  Pulse 65  Temp 97.4  F (36.3  C) (Oral)  Wt 186 lb 9.6 oz (84.6 kg)  SpO2 97%  BMI 26.71 kg/m2 Estimated body mass index is 26.71 kg/(m^2) as calculated from the following:    Height as of 1/12/17: 5' 10.08\" (1.78 m).    Weight as of this encounter: 186 lb 9.6 oz (84.6 kg).  Medication Reconciliation: complete       Tato Sorenson MA      "

## 2017-12-22 NOTE — PATIENT INSTRUCTIONS
Restart zoloft 25 mg and taper up to 50 mg (1 full tab0 over a week  Also start Wellbutrin  mg    Schedule with MTM (pharmacist - Alysha or Alessia) for approx 1 month  Follow-up with me in a month if you can't get in with them  OR if you do get in with them, in three months

## 2017-12-22 NOTE — MR AVS SNAPSHOT
After Visit Summary   12/22/2017    Dominic Workman    MRN: [id number]           Patient Information     Date Of Birth          1995        Visit Information        Provider Department      12/22/2017 2:45 PM Julia Maurer APRN [facility]        Today's Diagnoses     Major depressive disorder, recurrent episode, moderate (H)        ZOILA (generalized anxiety disorder)          Care Instructions    Restart zoloft 25 mg and taper up to 50 mg (1 full tab0 over a week  Also start Wellbutrin  mg    Schedule with MTM (pharmacist - Alysha or Alessia) for approx 1 month  Follow-up with me in a month if you can't get in with them  OR if you do get in with them, in three months          Follow-ups after your visit        Additional Services     MED THERAPY MANAGE REFERRAL       Your provider has referred you to: **[facility] Scheduling (numerous locations) [phone]   [email or web address]  FMG: [facility] [phone]   [email or web address]  UMP: Psychiatry [facility] [phone]   [email or web address]    Reason for Referral: three failed trials of SSRI due to sexual side effects, best anxiety control on zoloft, but not optimal depression control.  Currently restarting zoloft and adding wellbutrin.    Schedule with Alysha or Alessia    The [facility] department will contact you to schedule an appointment.  You may also schedule the appointment by calling [phone].  For [facility] patients, please call [phone] to confirm/schedule your appointment on the next business day.    This service is designed to help you get the most from your medications.  A specially trained Pharmacist will work closely with you and your  providers to solve any questions, concerns, issues or problems related to your medications.    Please bring all of your prescription and non-prescription medications (such as vitamins, over-the-counter medications, and herbals) or a detailed medication list to your appointment.    If you have a glucose meter or other home monitoring information, please also bring this to your appointment (i.e. blood glucose log, blood pressure log, pain log, etc.).                  Your next 10 appointments already scheduled     Dec 22, 2017  2:45 PM CST   Office Visit with ZARA Grimes CNP   [facility] ([facility])    [address] [place] [id number] [phone]           Bring a current list of meds and any records pertaining to this visit. For Physicals, please bring immunization records and any forms needing to be filled out. Please arrive 10 minutes early to complete paperwork.              Who to contact     If you have questions or need follow up information about today's clinic visit or your schedule please contact [facility] directly at [phone].  Normal or non-critical lab and imaging results will be communicated to you by Penzatahart, letter or phone within 4 business days after the clinic has received the results. If you do not hear from us within 7 days, please contact the clinic through ONDiGO Mobile CRMt or phone. If you have a critical or abnormal lab result, we will notify you by phone as soon as possible.  Submit refill requests through [email or web address] or call your pharmacy and they will forward the refill request to us. Please allow 3 business days for your refill to be completed.          Additional Information About Your Visit        Penzatahart Information     [email or web address] gives you secure access to your electronic health record. If you see a primary care provider, you can also send messages to your care team and make appointments. If you have questions,  please call your primary care clinic.  If you do not have a primary care provider, please call [phone] and they will assist you.        Care EveryWhere ID     This is your Care EveryWhere ID. This could be used by other organizations to access your [facility] medical records  [id number]        Your Vitals Were     Pulse Temperature Pulse Oximetry BMI (Body Mass Index)          65 97.4  F (36.3  C) (Oral) 97% 26.71 kg/m2         Blood Pressure from Last 3 Encounters:   12/22/17 142/86   10/18/17 132/78   02/15/17 131/79    Weight from Last 3 Encounters:   12/22/17 186 lb 9.6 oz (84.6 kg)   10/18/17 186 lb 1.6 oz (84.4 kg)   02/15/17 172 lb (78 kg)              We Performed the Following     MED THERAPY MANAGE REFERRAL          Today's Medication Changes          These changes are accurate as of: 12/22/17  2:13 PM.  If you have any questions, ask your nurse or doctor.               Start taking these medicines.        Dose/Directions    buPROPion 150 MG 24 hr tablet   Commonly known as:  WELLBUTRIN XL   Used for:  Major depressive disorder, recurrent episode, moderate (H), ZOILA (generalized anxiety disorder)   Started by:  Julia Maurer APRN CNP        Dose:  150 mg   Take 1 tablet (150 mg) by mouth every morning   Quantity:  30 tablet   Refills:  1         These medicines have changed or have updated prescriptions.        Dose/Directions    sertraline 50 MG tablet   Commonly known as:  ZOLOFT   This may have changed:  additional instructions   Used for:  Major depressive disorder, recurrent episode, moderate (H), ZOILA (generalized anxiety disorder)   Changed by:  Julia Maurer APRN CNP        Take 1/2 tablet (25 mg) for 1 weeks, then increase to 1 tablet orally daily   Quantity:  45 tablet   Refills:  1         Stop taking these medicines if you haven't already. Please contact your care team if you have questions.     citalopram 20 MG tablet   Commonly known as:  celeXA   Stopped by:  Julia Maurer APRN  CNP                Where to get your medicines      These medications were sent to [facility] [place] [facility] [place], [place] - [address]  [address], [facility] [address]     Phone:  [phone]     buPROPion 150 MG 24 hr tablet    sertraline 50 MG tablet                Primary Care Provider Office Phone # Fax #    Julia Maurer, APRN [facility] [phone]       [facility] [address]  [facility] [address]        Equal Access to Services     DOUGIE SANCHEZ : Hadii aad ku hadasho Soomaali, waaxda luqadaha, qaybta kaalmada adeegyada, waxay idiin hayaan adeeg kharajaren griffiths . So [facility] [phone].    ATENCIÓN: Si habla español, tiene a espinosa disposición servicios gratuitos de asistencia lingüística. Myrna al [phone].    We comply with applicable federal civil rights laws and [place] laws. We do not discriminate on the basis of race, color, national origin, age, disability, sex, sexual orientation, or gender identity.            Thank you!     Thank you for choosing [facility]  for your care. Our goal is always to provide you with excellent care. Hearing back from our patients is one way we can continue to improve our services. Please take a few minutes to complete the written survey that you may receive in the mail after your visit with us. Thank you!             Your Updated Medication List - Protect others around you: Learn how to safely use, store and throw away your medicines at www.disposemymeds.org.          This list is accurate as of: 12/22/17  2:13 PM.  Always use your most recent med list.                   Brand Name Dispense Instructions for use Diagnosis    buPROPion 150 MG 24 hr tablet    WELLBUTRIN XL    30 tablet    Take 1 tablet (150 mg) by mouth every morning    Major depressive disorder, recurrent episode, moderate (H), ZOILA (generalized anxiety disorder)       sertraline 50 MG tablet    ZOLOFT    45 tablet    Take 1/2 tablet (25 mg)  for 1 weeks, then increase to 1 tablet orally daily    Major depressive disorder, recurrent episode, moderate (H), ZOILA (generalized anxiety disorder)

## 2018-01-25 ENCOUNTER — TELEPHONE (OUTPATIENT)
Dept: PHARMACY | Facility: CLINIC | Age: 23
End: 2018-01-25

## 2018-01-25 NOTE — TELEPHONE ENCOUNTER
Pt had MTM appointment scheduled 1/25/18 at 1:00pm and did not attend. LVM with vague information and encouraged patient to call and reschedule.    Routed to PCP as Mae IsaacD  Medication Therapy Management Pharmacist  [facility]  Phone: [phone]  Pager: [phone]

## 2018-02-21 DIAGNOSIS — F33.1 MAJOR DEPRESSIVE DISORDER, RECURRENT EPISODE, MODERATE (H): ICD-10-CM

## 2018-02-21 DIAGNOSIS — F41.1 GAD (GENERALIZED ANXIETY DISORDER): ICD-10-CM

## 2018-02-21 NOTE — TELEPHONE ENCOUNTER
"Requested Prescriptions   Pending Prescriptions Disp Refills     buPROPion (WELLBUTRIN XL) 150 MG 24 hr tablet 30 tablet 1    Last Written Prescription Date:  12/22/17  Last Fill Quantity: 30,  # refills: 1   Last office visit: 12/22/2017 with prescribing provider:  12/22/17   Future Office Visit:     Sig: Take 1 tablet (150 mg) by mouth every morning    SSRIs Protocol Failed    2/21/2018  1:06 PM       Failed - PHQ-9 score less than 5 in past 6 months    The PHQ-9 criteria is meant to fail. It requires a PHQ-9 score review         Passed - Medication is Bupropion    If the medication is Bupropion (Wellbutrin), and the patient is taking for smoking cessation; OK to refill.         Passed - Patient is age 18 or older       Passed - Recent (6 mo) or future visit with authorizing provider's specialty    Patient had office visit in the last 6 months or has a visit in the next 30 days with authorizing provider.  See \"Patient Info\" tab in inbasket, or \"Choose Columns\" in Meds & Orders section of the refill encounter.              "

## 2018-02-22 RX ORDER — BUPROPION HYDROCHLORIDE 150 MG/1
150 TABLET ORAL EVERY MORNING
Qty: 30 TABLET | Refills: 1 | Status: SHIPPED | OUTPATIENT
Start: 2018-02-22 | End: 2018-06-15

## 2018-02-22 NOTE — TELEPHONE ENCOUNTER
Per plan, pt due for OV in March. Left VM for patient to schedule. Refill given.    Joycelyn Cuenca, DIANAN, RN  [facility]

## 2018-04-27 ENCOUNTER — TELEPHONE (OUTPATIENT)
Dept: FAMILY MEDICINE | Facility: CLINIC | Age: 23
End: 2018-04-27

## 2018-04-27 NOTE — LETTER
April 27, 2018      Dominic Workman  [address] [place] [address]        Dear Dominic,    In order to ensure we are providing the best quality care, we have reviewed your chart and see that you are due for:    1. Depression follow up    Please call the clinic at your earliest convenience to schedule an appointment.  If you have completed these please contact our office via phone or Mychart to update our records.  We would like to know the date (approximately month and year), the result, and ideally where the procedure was performed.    Thank you for trusting us with your health care.      Sincerely,    Care Team for JACKIE Steinberg

## 2018-04-27 NOTE — TELEPHONE ENCOUNTER
Panel Management Review      Patient has the following on his problem list:     Depression / Dysthymia review    Measure:  Needs PHQ-9 score of 4 or less during index window.  Administer PHQ-9 and if score is 5 or more, send encounter to provider for next steps.    5 - 7 month window range: 04/27/2018    PHQ-9 SCORE 2/15/2017 10/18/2017 12/22/2017   Total Score 17 19 16       If PHQ-9 recheck is 5 or more, route to provider for next steps.    Patient is due for:  PHQ9 and DAP      Composite cancer screening  Chart review shows that this patient is due/due soon for the following None  Summary:    Patient is due/failing the following:   DAP and PHQ9    Action needed:   Patient needs office visit for depression follow up.    Type of outreach:    Sent letter.    Questions for provider review:    None                                                                                                                                    Tato Sorenson MA     Chart routed to none.

## 2018-06-01 ENCOUNTER — TELEPHONE (OUTPATIENT)
Dept: FAMILY MEDICINE | Facility: CLINIC | Age: 23
End: 2018-06-01

## 2018-06-01 NOTE — TELEPHONE ENCOUNTER
Panel Management Review      Patient has the following on his problem list:     Depression / Dysthymia review    Measure:  Needs PHQ-9 score of 4 or less during index window.  Administer PHQ-9 and if score is 5 or more, send encounter to provider for next steps.    5 - 7 month window range: 06/01/2018    PHQ-9 SCORE 2/15/2017 10/18/2017 12/22/2017   Total Score 17 19 16       If PHQ-9 recheck is 5 or more, route to provider for next steps.    Patient is due for:  PHQ9 and DAP      Composite cancer screening  Chart review shows that this patient is due/due soon for the following None  Summary:    Patient is due/failing the following:   DAP and PHQ9    Action needed:   Patient needs office visit for depression follow up. and Patient needs to do PHQ9.    Type of outreach:    Phone, left message for patient to call back.  and Sent MyChart message.    Questions for provider review:    None                                                                                                                                    Tato Sorenson MA     Chart routed to none.

## 2018-06-14 NOTE — PROGRESS NOTES
"  SUBJECTIVE:   Dominic Workman is a 23 year old male who presents to clinic today for the following health issues:    Depression and Anxiety Follow-Up    Status since last visit: No change    Other associated symptoms:None    Complicating factors:     Significant life event: Yes-  Grandmother (Lashonda's mother) passed away, had dementia - going to [facility] in [place] next weekend    Current substance abuse: None    Was feeling good on the last medication and then \"just didn't give a shit\"  When refill was due, just didn't refill  Off meds:  energy level was lower and sleeping a lot more again, mood was worse with irritability, down, feeling useless, anxiety  Not snoring or apnea reported by bed partner  Feeling like things would be better if he didn't exist.  No active suicide plans  The med combination was the best, but still not the optimal  Therapy once every two weeks - [facility]  Anticipating graduation in two more semesters  Living with four roommates  Working at [employer] this summer  Sister is in town - working at , moving to [place] in July    PHQ-9 2/15/2017 10/18/2017 12/22/2017   Total Score 17 19 16   Q9: Suicide Ideation Several days Several days Several days     ZOILA-7 SCORE 1/12/2017 2/15/2017 10/18/2017   Total Score 4 4 15     In the past two weeks have you had thoughts of suicide or self-harm?  Yes  In the past two weeks have you thought of a plan or intent to harm yourself? No.  Do you have concerns about your personal safety or the safety of others?   No  PHQ-9  English  PHQ-9   Any Language  ZOILA-7  Suicide Assessment Five-step Evaluation and Treatment (SAFE-T)    Amount of exercise or physical activity: None    Problems taking medications regularly: No    Medication side effects: none    Diet: regular (no restrictions)    Questions/Concerns: none    Problem list and histories reviewed & adjusted, as indicated.  Additional history: as documented    Reviewed and updated as needed this visit " "by clinical staff       Reviewed and updated as needed this visit by Provider         ROS:  Constitutional, HEENT, cardiovascular, pulmonary, gi and gu systems are negative, except as otherwise noted.    OBJECTIVE:     /82  Pulse 88  Temp 97.8  F (36.6  C) (Oral)  Ht 5' 10.08\" (1.78 m)  Wt 214 lb 1.6 oz (97.1 kg)  SpO2 99%  BMI 30.65 kg/m2  Body mass index is 30.65 kg/(m^2).  GENERAL: healthy, alert and no distress  EYES: Eyes grossly normal to inspection, PERRL and conjunctivae and sclerae normal  HENT: ear canals and TM's normal, nose and mouth without ulcers or lesions  NECK: no adenopathy, no asymmetry, masses, or scars and thyroid normal to palpation  RESP: lungs clear to auscultation - no rales, rhonchi or wheezes  CV: regular rate and rhythm, normal S1 S2, no S3 or S4, no murmur, click or rub, no peripheral edema and peripheral pulses strong  ABDOMEN: soft, nontender, no hepatosplenomegaly, no masses and bowel sounds normal  MS: no gross musculoskeletal defects noted, no edema  SKIN: no suspicious lesions or rashes  NEURO: Normal strength and tone, mentation intact and speech normal  PSYCH: mentation appears normal, affect normal    Diagnostic Test Results:  none     ASSESSMENT/PLAN:     Depression; recurrent episode-- Moderate   Associated with the following complications:    None   Plan:  Medications:   Bupropion- restart    (F33.1) Major depressive disorder, recurrent episode, moderate (H)  Comment:   Plan: buPROPion (WELLBUTRIN XL) 150 MG 24 hr tablet,         MED THERAPY MANAGE REFERRAL            (F41.1) ZOILA (generalized anxiety disorder)  Comment:   Plan: buPROPion (WELLBUTRIN XL) 150 MG 24 hr tablet,         MED THERAPY MANAGE REFERRAL              Patient Instructions   Restart Wellbutrin  Continue - consider weekly while we restarting meds  Daily exercise  Return in six weeks - hopefully with pharmacy co-visit    Aim for 5-7 servings of vegetables (raw vegetables - 1 serving = 1/2 cup; " raw greens - 1 serving = 1 cup)      ZARA Brand [facility]

## 2018-06-15 ENCOUNTER — OFFICE VISIT (OUTPATIENT)
Dept: FAMILY MEDICINE | Facility: CLINIC | Age: 23
End: 2018-06-15
Payer: COMMERCIAL

## 2018-06-15 VITALS
DIASTOLIC BLOOD PRESSURE: 82 MMHG | HEART RATE: 88 BPM | OXYGEN SATURATION: 99 % | HEIGHT: 70 IN | SYSTOLIC BLOOD PRESSURE: 136 MMHG | BODY MASS INDEX: 30.65 KG/M2 | TEMPERATURE: 97.8 F | WEIGHT: 214.1 LBS

## 2018-06-15 DIAGNOSIS — F41.1 GAD (GENERALIZED ANXIETY DISORDER): ICD-10-CM

## 2018-06-15 DIAGNOSIS — F33.1 MAJOR DEPRESSIVE DISORDER, RECURRENT EPISODE, MODERATE (H): ICD-10-CM

## 2018-06-15 PROCEDURE — 99214 OFFICE O/P EST MOD 30 MIN: CPT | Performed by: NURSE PRACTITIONER

## 2018-06-15 RX ORDER — BUPROPION HYDROCHLORIDE 150 MG/1
150 TABLET ORAL EVERY MORNING
Qty: 60 TABLET | Refills: 1 | Status: SHIPPED | OUTPATIENT
Start: 2018-06-15 | End: 2018-07-20

## 2018-06-15 ASSESSMENT — ANXIETY QUESTIONNAIRES
7. FEELING AFRAID AS IF SOMETHING AWFUL MIGHT HAPPEN: NOT AT ALL
GAD7 TOTAL SCORE: 10
2. NOT BEING ABLE TO STOP OR CONTROL WORRYING: NEARLY EVERY DAY
IF YOU CHECKED OFF ANY PROBLEMS ON THIS QUESTIONNAIRE, HOW DIFFICULT HAVE THESE PROBLEMS MADE IT FOR YOU TO DO YOUR WORK, TAKE CARE OF THINGS AT HOME, OR GET ALONG WITH OTHER PEOPLE: SOMEWHAT DIFFICULT
6. BECOMING EASILY ANNOYED OR IRRITABLE: SEVERAL DAYS
3. WORRYING TOO MUCH ABOUT DIFFERENT THINGS: MORE THAN HALF THE DAYS
5. BEING SO RESTLESS THAT IT IS HARD TO SIT STILL: NOT AT ALL
1. FEELING NERVOUS, ANXIOUS, OR ON EDGE: NEARLY EVERY DAY

## 2018-06-15 ASSESSMENT — PATIENT HEALTH QUESTIONNAIRE - PHQ9: 5. POOR APPETITE OR OVEREATING: SEVERAL DAYS

## 2018-06-15 NOTE — MR AVS SNAPSHOT
After Visit Summary   6/15/2018    Dominic Workman    MRN: [id number]           Patient Information     Date Of Birth          1995        Visit Information        Provider Department      6/15/2018 10:15 AM Julia Maurer APRN [facility]        Today's Diagnoses     Major depressive disorder, recurrent episode, moderate (H)        ZOILA (generalized anxiety disorder)          Care Instructions    Restart Wellbutrin  Continue - consider weekly while we restarting meds  Daily exercise  Return in six weeks - hopefully with pharmacy co-visit    Aim for 5-7 servings of vegetables (raw vegetables - 1 serving = 1/2 cup; raw greens - 1 serving = 1 cup)          Follow-ups after your visit        Additional Services     MED THERAPY MANAGE REFERRAL       Your provider has referred you to: **[facility] Scheduling (numerous locations) [phone]   [email or web address]  [facility]: Psychiatry Clinic [facility] [phone]   [email or web address]    Reason for Referral: depression and anxiety med non-success; request co-visit with Cristy if possible    The [facility] department will contact you to schedule an appointment.  You may also schedule the appointment by calling [phone].  For [facility] patients, please call [phone] to confirm/schedule your appointment on the next business day.    This service is designed to help you get the most from your medications.  A specially trained Pharmacist will work closely with you and your providers to solve any questions, concerns, issues or problems related to your medications.    Please bring all of your prescription and non-prescription medications (such as vitamins, over-the-counter medications, and herbals) or a detailed medication list to your appointment.    If you have a  glucose meter or other home monitoring information, please also bring this to your appointment (i.e. blood glucose log, blood pressure log, pain log, etc.).                  Your next 10 appointments already scheduled     Jul 20, 2018  2:00 PM CDT   Office Visit with ZARA Grimes CNP   [facility] ([facility]    [address] [place] [id number] [phone]           Bring a current list of meds and any records pertaining to this visit. For Physicals, please bring immunization records and any forms needing to be filled out. Please arrive 10 minutes early to complete paperwork.              Who to contact     If you have questions or need follow up information about today's clinic visit or your schedule please contact [facility] directly at [phone].  Normal or non-critical lab and imaging results will be communicated to you by [email or web address], letter or phone within 4 business days after the clinic has received the results. If you do not hear from us within 7 days, please contact the clinic through [email or web address] or phone. If you have a critical or abnormal lab result, we will notify you by phone as soon as possible.  Submit refill requests through Cobook or call your pharmacy and they will forward the refill request to us. Please allow 3 business days for your refill to be completed.          Additional Information About Your Visit        [email or web address] Information     Cobook gives you secure access to your electronic health record. If you see a primary care provider, you can also send messages to your care team and make appointments. If you have questions, please call your primary care clinic.  If you do not have a primary care provider, please call [phone] and they will assist you.        Care EveryWhere ID     This is your Care EveryWhere ID. This could be used by other organizations to access your [facility] medical records  [id number]    "     Your Vitals Were     Pulse Temperature Height Pulse Oximetry BMI (Body Mass Index)       88 97.8  F (36.6  C) (Oral) 5' 10.08\" (1.78 m) 99% 30.65 kg/m2        Blood Pressure from Last 3 Encounters:   06/15/18 136/82   12/22/17 142/86   10/18/17 132/78    Weight from Last 3 Encounters:   06/15/18 214 lb 1.6 oz (97.1 kg)   12/22/17 186 lb 9.6 oz (84.6 kg)   10/18/17 186 lb 1.6 oz (84.4 kg)              We Performed the Following     MED THERAPY MANAGE REFERRAL          Where to get your medicines      These medications were sent to [facility] [place], [place] - [address]  [address], [facility] [address]     Phone:  [phone]     buPROPion 150 MG 24 hr tablet          Primary Care Provider Office Phone # Fax #    Julia LYNNE Erasto, APRN [facility] [phone]       [address]  [facility] [address]        Equal Access to Services     AISHA SANCHEZ : Hadii amee briceñoo Sochristopher, waaxda luqadaha, qaybta kaalmada aderafaelyada, estrada griffiths . So [facility] [phone].    ATENCIÓN: Si habla español, tiene a espinosa disposición servicios gratuitos de asistencia lingüística. Llame al [phone].    We comply with applicable federal civil rights laws and [place] laws. We do not discriminate on the basis of race, color, national origin, age, disability, sex, sexual orientation, or gender identity.            Thank you!     Thank you for choosing [facility]  for your care. Our goal is always to provide you with excellent care. Hearing back from our patients is one way we can continue to improve our services. Please take a few minutes to complete the written survey that you may receive in the mail after your visit with us. Thank you!             Your Updated Medication List - Protect others around you: Learn how to safely use, store and throw away your medicines at www.disposemymeds.org.          This list is accurate as of 6/15/18 " 11:27 AM.  Always use your most recent med list.                   Brand Name Dispense Instructions for use Diagnosis    buPROPion 150 MG 24 hr tablet    WELLBUTRIN XL    60 tablet    Take 1 tablet (150 mg) by mouth every morning Due for office visit before more refills.    Major depressive disorder, recurrent episode, moderate (H), ZOILA (generalized anxiety disorder)       sertraline 50 MG tablet    ZOLOFT    45 tablet    Take 1/2 tablet (25 mg) for 1 weeks, then increase to 1 tablet orally daily    Major depressive disorder, recurrent episode, moderate (H), ZOILA (generalized anxiety disorder)

## 2018-06-15 NOTE — PATIENT INSTRUCTIONS
Restart Wellbutrin  Continue - consider weekly while we restarting meds  Daily exercise  Return in six weeks - hopefully with pharmacy co-visit    Aim for 5-7 servings of vegetables (raw vegetables - 1 serving = 1/2 cup; raw greens - 1 serving = 1 cup)

## 2018-06-16 ASSESSMENT — ANXIETY QUESTIONNAIRES: GAD7 TOTAL SCORE: 10

## 2018-06-16 ASSESSMENT — PATIENT HEALTH QUESTIONNAIRE - PHQ9: SUM OF ALL RESPONSES TO PHQ QUESTIONS 1-9: 16

## 2018-07-19 NOTE — PROGRESS NOTES
"  SUBJECTIVE:   Dominic Workman is a 23 year old male who presents to clinic today for the following health issues:    Depression Followup    Status since last visit: Stable; things have pretty good, had a couple days of extreme anxiety as well as day to day anxiety baseline.  Anxiety usually \"in head\" and not physical    On wellbutrin for 3-4 weeks; previously wellbutrin plus prozac, prozac alone and zoloft alone    No migraines, libido is normal, appetite is normal, energy is better; still sleeping a lot and has irregular sleep schedule    Continues with same therapist once every week to two weeks    Smokes marijuana daily which often helps with anxiety, but has had strains that temporarily worsen anxiety; also helpful for migraines    Migraines were bad 3-4 per week in transition from spring to summer; currently once per week and can last from 3 hours to 24 hours.  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  Did see neurologist as a child    See PHQ-9 for current symptoms.  Other associated symptoms: None    Complicating factors:   Significant life event:  No   Current substance abuse:  None  Anxiety or Panic symptoms:  Yes-  Anxiety     PHQ-9 10/18/2017 12/22/2017 6/15/2018   Total Score 19 16 16   Q9: Suicide Ideation Several days Several days More than half the days   F/U: Thoughts of suicide or self-harm - - Yes   F/U: Self harm-plan - - No   F/U: Self-harm action - - No   F/U: Safety concerns - - No     In the past two weeks have you had thoughts of suicide or self-harm?  Yes  In the past two weeks have you thought of a plan or intent to harm yourself? Yes  In the past two weeks have you acted on these thoughts in any way?  No.    Do you have concerns about your personal safety or the safety of others?   No  PHQ-9  English  PHQ-9   Any Language  Suicide Assessment Five-step Evaluation and " Treatment (SAFE-T)    Amount of exercise or physical activity: None    Problems taking medications regularly: No    Medication side effects: none    Diet: regular (no restrictions)    Questions/Concerns: none    Wt Readings from Last 5 Encounters:   07/20/18 209 lb 12.8 oz (95.2 kg)   06/15/18 214 lb 1.6 oz (97.1 kg)   12/22/17 186 lb 9.6 oz (84.6 kg)   10/18/17 186 lb 1.6 oz (84.4 kg)   02/15/17 172 lb (78 kg)         Problem list and histories reviewed & adjusted, as indicated.  Additional history: as documented    Reviewed and updated as needed this visit by clinical staff       Reviewed and updated as needed this visit by Provider         ROS:  Constitutional, HEENT, cardiovascular, pulmonary, gi and gu systems are negative, except as otherwise noted.    OBJECTIVE:     /86  Pulse 90  Temp 98.1  F (36.7  C) (Oral)  Wt 209 lb 12.8 oz (95.2 kg)  SpO2 96%  BMI 30.04 kg/m2  Body mass index is 30.04 kg/(m^2).  GENERAL: healthy, alert and no distress  NECK: no adenopathy, no asymmetry, masses, or scars and thyroid normal to palpation  RESP: lungs clear to auscultation - no rales, rhonchi or wheezes  CV: regular rate and rhythm, normal S1 S2, no S3 or S4, no murmur, click or rub, no peripheral edema and peripheral pulses strong  MENTAL STATUS EXAM  Appearance: appropriate  Attitude: cooperative  Behavior: normal  Eye Contact: normal  Speech: normal  Orientation: oriented to person , place, time and situation  Mood:  admits anxiety   Affect: normal, bright  Thought Process: clear  Suicidal Ideation: reports thoughts, no intention  Hallucination: no    Diagnostic Test Results:  none   Please note greater than 50% of this 25 minute appointment were spent face-to-face in counseling with the patient of the issues described above in the history of present illness and in the plan, including depression and anxiety treatment options including medication, counseling, exercise, diet changes, meditation and supportive  community, as well as side effects of medications.    ASSESSMENT/PLAN:     Depression; recurrent episode-- Moderate and Partial remission   Associated with the following complications:    zoila   Plan:  Medications:   Bupropion- increase      BP Screening:   Last 3 BP Readings:    BP Readings from Last 3 Encounters:   07/20/18 136/86   06/15/18 136/82   12/22/17 142/86       The following was recommended to the patient:  Re-screen BP within a year and recommended lifestyle modifications    (F33.1) Major depressive disorder, recurrent episode, moderate (H)  (primary encounter diagnosis)  Comment:   Plan: buPROPion (WELLBUTRIN XL) 300 MG 24 hr tablet            (F41.1) ZOILA (generalized anxiety disorder)  Comment:   Plan: buPROPion (WELLBUTRIN XL) 300 MG 24 hr tablet              Patient Instructions   Increase wellbutrin to 300 mg  Change timing of wellbutrin administration to consistently at 10 am even if you need to set and alarm  Continue to move earlier if continuing to sleep in and stay up late  Avoid screens 3-4 hours before bedtime and/or install FLUX    Magnesium glycinate 400-600 mg nightly by midnight or earlier  Lance may be another option    You can follow-up with Cristy for medication changes related to Wellbutrin in 4-6 weeks  Follow-up with Stephy in 3 months      ZARA Brand [facility]

## 2018-07-20 ENCOUNTER — OFFICE VISIT (OUTPATIENT)
Dept: PHARMACY | Facility: CLINIC | Age: 23
End: 2018-07-20
Payer: COMMERCIAL

## 2018-07-20 ENCOUNTER — OFFICE VISIT (OUTPATIENT)
Dept: FAMILY MEDICINE | Facility: CLINIC | Age: 23
End: 2018-07-20
Payer: COMMERCIAL

## 2018-07-20 VITALS
OXYGEN SATURATION: 96 % | HEART RATE: 90 BPM | DIASTOLIC BLOOD PRESSURE: 86 MMHG | TEMPERATURE: 98.1 F | BODY MASS INDEX: 30.04 KG/M2 | SYSTOLIC BLOOD PRESSURE: 136 MMHG | WEIGHT: 209.8 LBS

## 2018-07-20 DIAGNOSIS — F33.1 MAJOR DEPRESSIVE DISORDER, RECURRENT EPISODE, MODERATE (H): Primary | ICD-10-CM

## 2018-07-20 DIAGNOSIS — F41.1 GAD (GENERALIZED ANXIETY DISORDER): ICD-10-CM

## 2018-07-20 DIAGNOSIS — G43.909 MIGRAINE WITHOUT STATUS MIGRAINOSUS, NOT INTRACTABLE, UNSPECIFIED MIGRAINE TYPE: ICD-10-CM

## 2018-07-20 PROCEDURE — 99607 MTMS BY PHARM ADDL 15 MIN: CPT | Performed by: PHARMACIST

## 2018-07-20 PROCEDURE — 99214 OFFICE O/P EST MOD 30 MIN: CPT | Performed by: NURSE PRACTITIONER

## 2018-07-20 PROCEDURE — 99605 MTMS BY PHARM NP 15 MIN: CPT | Performed by: PHARMACIST

## 2018-07-20 RX ORDER — BUPROPION HYDROCHLORIDE 300 MG/1
300 TABLET ORAL EVERY MORNING
Qty: 90 TABLET | Refills: 1 | Status: SHIPPED | OUTPATIENT
Start: 2018-07-20 | End: 2018-08-28

## 2018-07-20 ASSESSMENT — ANXIETY QUESTIONNAIRES
6. BECOMING EASILY ANNOYED OR IRRITABLE: MORE THAN HALF THE DAYS
3. WORRYING TOO MUCH ABOUT DIFFERENT THINGS: MORE THAN HALF THE DAYS
5. BEING SO RESTLESS THAT IT IS HARD TO SIT STILL: SEVERAL DAYS
7. FEELING AFRAID AS IF SOMETHING AWFUL MIGHT HAPPEN: MORE THAN HALF THE DAYS
IF YOU CHECKED OFF ANY PROBLEMS ON THIS QUESTIONNAIRE, HOW DIFFICULT HAVE THESE PROBLEMS MADE IT FOR YOU TO DO YOUR WORK, TAKE CARE OF THINGS AT HOME, OR GET ALONG WITH OTHER PEOPLE: SOMEWHAT DIFFICULT
2. NOT BEING ABLE TO STOP OR CONTROL WORRYING: MORE THAN HALF THE DAYS
1. FEELING NERVOUS, ANXIOUS, OR ON EDGE: NEARLY EVERY DAY
GAD7 TOTAL SCORE: 14

## 2018-07-20 ASSESSMENT — PATIENT HEALTH QUESTIONNAIRE - PHQ9: 5. POOR APPETITE OR OVEREATING: MORE THAN HALF THE DAYS

## 2018-07-20 NOTE — MR AVS SNAPSHOT
After Visit Summary   7/20/2018    Dominic Workman    MRN: [id number]           Patient Information     Date Of Birth          1995        Visit Information        Provider Department      7/20/2018 2:00 PM Cristy Palmer [facility]        Today's Diagnoses     Major depressive disorder, recurrent episode, moderate (H)    -  1    ZOILA (generalized anxiety disorder)        Migraine without status migrainosus, not intractable, unspecified migraine type           Follow-ups after your visit        Your next 10 appointments already scheduled     Aug 28, 2018  2:00 PM CDT   [facility]    [address] [place] [id number] [phone]              Who to contact     If you have questions or need follow up information about today's clinic visit or your schedule please contact [facility] directly at [phone].  Normal or non-critical lab and imaging results will be communicated to you by [employer], letter or phone within 4 business days after the clinic has received the results. If you do not hear from us within 7 days, please contact the clinic through [employer] or phone. If you have a critical or abnormal lab result, we will notify you by phone as soon as possible.  Submit refill requests through [employer] or call your pharmacy and they will forward the refill request to us. Please allow 3 business days for your refill to be completed.          Additional Information About Your Visit        [employer] Information     [employer] gives you secure access to your electronic health record. If you see a primary care provider, you can also send messages to your care team and make appointments. If you have questions, please call your primary care clinic.  If you do not have a primary care  provider, please call [phone] and they will assist you.        Care EveryWhere ID     This is your Care EveryWhere ID. This could be used by other organizations to access your [facility] medical records  [id number]         Blood Pressure from Last 3 Encounters:   07/20/18 136/86   06/15/18 136/82   12/22/17 142/86    Weight from Last 3 Encounters:   07/20/18 209 lb 12.8 oz (95.2 kg)   06/15/18 214 lb 1.6 oz (97.1 kg)   12/22/17 186 lb 9.6 oz (84.6 kg)              Today, you had the following     No orders found for display         Today's Medication Changes          These changes are accurate as of 7/20/18  3:49 PM.  If you have any questions, ask your nurse or doctor.               These medicines have changed or have updated prescriptions.        Dose/Directions    buPROPion 300 MG 24 hr tablet   Commonly known as:  WELLBUTRIN XL   This may have changed:    - medication strength  - how much to take  - additional instructions   Used for:  Major depressive disorder, recurrent episode, moderate (H), ZOILA (generalized anxiety disorder)   Changed by:  Julia Maurer, APRN CNP        Dose:  300 mg   Take 1 tablet (300 mg) by mouth every morning   Quantity:  90 tablet   Refills:  1            Where to get your medicines      These medications were sent to [facility] [place], [place] - [address]  [address], [facility] [address]     Phone:  [phone]     buPROPion 300 MG 24 hr tablet                Primary Care Provider Office Phone # Fax #    ZARA Grimes [phone]       [address]  [facility] [address]        Equal Access to Services     Scripps Memorial HospitalADRIANNA : Hadii amee leon hadasho Soomaali, waaxda luqadaha, qaybta kaalmada adeegyada, estrada griffiths . So [facility] [phone].    ATENCIÓN: Si habla español, tiene a espinosa disposición servicios gratuitos de asistencia lingüística. Llame al [phone].    We comply with  applicable federal civil rights laws and [place] laws. We do not discriminate on the basis of race, color, national origin, age, disability, sex, sexual orientation, or gender identity.            Thank you!     Thank you for choosing [facility]  for your care. Our goal is always to provide you with excellent care. Hearing back from our patients is one way we can continue to improve our services. Please take a few minutes to complete the written survey that you may receive in the mail after your visit with us. Thank you!             Your Updated Medication List - Protect others around you: Learn how to safely use, store and throw away your medicines at www.disposemymeds.org.          This list is accurate as of 7/20/18  3:49 PM.  Always use your most recent med list.                   Brand Name Dispense Instructions for use Diagnosis    aspirin-acetaminophen-caffeine 250-250-65 MG per tablet    EXCEDRIN MIGRAINE     Take 1 tablet by mouth every 6 hours as needed for headaches        buPROPion 300 MG 24 hr tablet    WELLBUTRIN XL    90 tablet    Take 1 tablet (300 mg) by mouth every morning    Major depressive disorder, recurrent episode, moderate (H), ZOILA (generalized anxiety disorder)       cholecalciferol 1000 UNIT tablet    vitamin D3     Take 1,000 Units by mouth daily

## 2018-07-20 NOTE — PATIENT INSTRUCTIONS
Increase wellbutrin to 300 mg  Change timing of wellbutrin administration to consistently at 10 am even if you need to set and alarm  Continue to move earlier if continuing to sleep in and stay up late  Avoid screens 3-4 hours before bedtime and/or install FLUX    Magnesium glycinate 400-600 mg nightly by midnight or earlier  Butterbur may be another option    You can follow-up with Cristy for medication changes related to Wellbutrin in 4-6 weeks  Follow-up with Stephy in 3 months

## 2018-07-20 NOTE — MR AVS SNAPSHOT
After Visit Summary   7/20/2018    Dominic Workman    MRN: [id number]           Patient Information     Date Of Birth          1995        Visit Information        Provider Department      7/20/2018 2:00 PM Julia Maurer APRN CNP [facility]        Today's Diagnoses     Major depressive disorder, recurrent episode, moderate (H)    -  1    ZOILA (generalized anxiety disorder)          Care Instructions    Increase wellbutrin to 300 mg  Change timing of wellbutrin administration to consistently at 10 am even if you need to set and alarm  Continue to move earlier if continuing to sleep in and stay up late  Avoid screens 3-4 hours before bedtime and/or install FLUX    Magnesium glycinate 400-600 mg nightly by midnight or earlier  Butterbur may be another option    You can follow-up with Cristy for medication changes related to Wellbutrin in 4-6 weeks  Follow-up with Stephy in 3 months          Follow-ups after your visit        Who to contact     If you have questions or need follow up information about today's clinic visit or your schedule please contact [facility] directly at [phone].  Normal or non-critical lab and imaging results will be communicated to you by [employer], letter or phone within 4 business days after the clinic has received the results. If you do not hear from us within 7 days, please contact the clinic through [employer] or phone. If you have a critical or abnormal lab result, we will notify you by phone as soon as possible.  Submit refill requests through [employer] or call your pharmacy and they will forward the refill request to us. Please allow 3 business days for your refill to be completed.          Additional Information About Your Visit        MyChart Information     [employer] gives you secure access to your electronic health record. If you see a primary care provider, you can also send messages to your care team and make appointments. If you  have questions, please call your primary care clinic.  If you do not have a primary care provider, please call [phone] and they will assist you.        Care EveryWhere ID     This is your Care EveryWhere ID. This could be used by other organizations to access your [place] medical records  [id number]        Your Vitals Were     Pulse Temperature Pulse Oximetry BMI (Body Mass Index)          90 98.1  F (36.7  C) (Oral) 96% 30.04 kg/m2         Blood Pressure from Last 3 Encounters:   07/20/18 136/86   06/15/18 136/82   12/22/17 142/86    Weight from Last 3 Encounters:   07/20/18 209 lb 12.8 oz (95.2 kg)   06/15/18 214 lb 1.6 oz (97.1 kg)   12/22/17 186 lb 9.6 oz (84.6 kg)              Today, you had the following     No orders found for display         Today's Medication Changes          These changes are accurate as of 7/20/18  2:24 PM.  If you have any questions, ask your nurse or doctor.               These medicines have changed or have updated prescriptions.        Dose/Directions    buPROPion 300 MG 24 hr tablet   Commonly known as:  WELLBUTRIN XL   This may have changed:    - medication strength  - how much to take  - additional instructions   Used for:  Major depressive disorder, recurrent episode, moderate (H), ZOILA (generalized anxiety disorder)   Changed by:  Julia Maurer APRN CNP        Dose:  300 mg   Take 1 tablet (300 mg) by mouth every morning   Quantity:  90 tablet   Refills:  1            Where to get your medicines      These medications were sent to [facility] [place], [place] - [address]  [address], [facility] [address]     Phone:  [phone]     buPROPion 300 MG 24 hr tablet                Primary Care Provider Office Phone # Fax #    ZARA Grimes [phone]       [address]  [facility] [address]        Equal Access to Services     DOUGIE SANCHEZ AH: Bao Smith, aliya aguilera, sergio solis  estrada pinedaloidajaren lanRhodaaan ah. Pam [facility] [phone].    ATENCIÓN: Si habla jane, tiene a espinosa disposición servicios gratuitos de asistencia lingüística. Myrna al [phone].    We comply with applicable federal civil rights laws and [place] laws. We do not discriminate on the basis of race, color, national origin, age, disability, sex, sexual orientation, or gender identity.            Thank you!     Thank you for choosing [facility]  for your care. Our goal is always to provide you with excellent care. Hearing back from our patients is one way we can continue to improve our services. Please take a few minutes to complete the written survey that you may receive in the mail after your visit with us. Thank you!             Your Updated Medication List - Protect others around you: Learn how to safely use, store and throw away your medicines at www.disposemymeds.org.          This list is accurate as of 7/20/18  2:24 PM.  Always use your most recent med list.                   Brand Name Dispense Instructions for use Diagnosis    buPROPion 300 MG 24 hr tablet    WELLBUTRIN XL    90 tablet    Take 1 tablet (300 mg) by mouth every morning    Major depressive disorder, recurrent episode, moderate (H), ZOILA (generalized anxiety disorder)

## 2018-07-20 NOTE — PROGRESS NOTES
"SUBJECTIVE/OBJECTIVE:                           Dominic Workman is a 23 year old male coming in for an initial visit for Medication Therapy Management.  He was referred to me from ZARA Hernández CNP and today was a co-visit with her.    Chief Complaint: \"Mood is better, but not great\"    Allergies/ADRs: Reviewed in Epic  Tobacco: No tobacco use  Alcohol: not currently using  Activity: minimal  Substances: smokes marijuana at least once daily, with inconsistent strains  PMH: Reviewed in Epic    Medication Adherence/Access:  no issues reported    Depression/Anxiety: Pt is currently taking bupropion ER 150mg daily at about 3pm, which was started about one month ago.  He had previously taken sertraline, fluoxetine, and fluoxetine + bupropion and feels that current bupropion monotherapy has been the most effective for depression.  He reported feeling that SSRIs had been helpful for anxiety, but not effective for depression.  He did endorse having some peaks of anxiety, which generally only occur when he starts thinking about the future and are more \"in my head\" than physical symptoms.  He smokes marijuana daily, but reported that use is independent of heightened anxiety.  He did report that certain unknown strains in the past have increased anxiety, but that hasn't happened for a while.  Pt noted that appetite is improved with bupropion and energy is better upon waking.  He is currently going to sleep around 6am and waking around 3pm, sometimes napping around 7pm.  He sometimes wakes up around 10am-noon, but usually goes back to sleep.  He is falling asleep and staying asleep well, but feels that he is sleeping too much.  He is commonly using/watching screens for many hours prior to bedtime.  He does see therapist weekly or every other week. He is not currently working. Pt endorsed chronic passive suicidal thoughts, but denied any intent or plan.    PHQ-9 SCORE 12/22/2017 6/15/2018 7/20/2018   Total Score 16 16 16 "     Migraines: Pt described having a migraine about once weekly, which were originally diagnosed by Neurology as a child and generally occur bilaterally above his eyes.  Pt endorsed significant phono and photosensitivity and reported that they last 3-24 hours. Migraines are the worst during spring to summer transition, during which time he most recently had three per week.  He does have some likely  sumatriptan (unknown dose) at home, which he does find helpful for severe migraines, but noted that it is less helpful if he delays dosing.  He more frequently takes Excedrin Migraine, which is helpful if he takes it soon after onset.  He also uses marijuana to attenuate the severity, which he finds helpful.  He has not taken a prophylactic medication, other than Depakote as child, to which he had significant nausea and 30lb weight loss.    BP Readings from Last 3 Encounters:   18 136/86   06/15/18 136/82   17 142/86     ASSESSMENT:                             Current medications were reviewed today.     Medication Adherence: excellent, no issues identified    Depression/Anxiety: Pt may benefit from increasing bupropion ER to 300mg daily. Pt may be having some activation into the evening, which could be contributing to lack of tiredness at night, though the effect can be patient-specific.  Discussed administration time and recommended he take the dose as early in the day as he can, even if he goes back to sleep.  Further discussed methods of shifting bedtime to earlier in the night and avoiding screens/blue light within a few hours before desired bedtime.    Migraines: Continue using Excedrin Migraine.  Propranolol could be useful in the future, which could also help blood pressure.  It may also be useful for anxiety, though is typically most beneficial for physical symptoms, which this patient denied.  Will continue to follow.    PLAN:                            1. Increase bupropion ER to 300mg  daily, taking first thing in the day- Rx sent by PCP    I spent 30 minutes with this patient today. A copy of the visit note was provided to the patient's primary care provider.    Will follow up in 6 weeks.  Will review full depression trials at that time.    The patient was given a summary of these recommendations as an after visit summary.     Cristy Palmer, PharmD  Medication Therapy Management Pharmacist  [facility]  Phone: [phone]

## 2018-07-21 ASSESSMENT — PATIENT HEALTH QUESTIONNAIRE - PHQ9: SUM OF ALL RESPONSES TO PHQ QUESTIONS 1-9: 16

## 2018-07-21 ASSESSMENT — ANXIETY QUESTIONNAIRES: GAD7 TOTAL SCORE: 14

## 2018-08-28 ENCOUNTER — OFFICE VISIT (OUTPATIENT)
Dept: PHARMACY | Facility: CLINIC | Age: 23
End: 2018-08-28
Payer: COMMERCIAL

## 2018-08-28 DIAGNOSIS — G43.909 MIGRAINE WITHOUT STATUS MIGRAINOSUS, NOT INTRACTABLE, UNSPECIFIED MIGRAINE TYPE: Primary | ICD-10-CM

## 2018-08-28 DIAGNOSIS — F41.1 GAD (GENERALIZED ANXIETY DISORDER): ICD-10-CM

## 2018-08-28 DIAGNOSIS — F33.1 MAJOR DEPRESSIVE DISORDER, RECURRENT EPISODE, MODERATE (H): ICD-10-CM

## 2018-08-28 PROCEDURE — 99607 MTMS BY PHARM ADDL 15 MIN: CPT | Performed by: PHARMACIST

## 2018-08-28 PROCEDURE — 99606 MTMS BY PHARM EST 15 MIN: CPT | Performed by: PHARMACIST

## 2018-08-28 RX ORDER — VENLAFAXINE HYDROCHLORIDE 75 MG/1
75 CAPSULE, EXTENDED RELEASE ORAL DAILY
Qty: 30 CAPSULE | Refills: 0 | Status: SHIPPED | OUTPATIENT
Start: 2018-08-28 | End: 2018-09-25

## 2018-08-28 RX ORDER — BUPROPION HYDROCHLORIDE 150 MG/1
150 TABLET ORAL EVERY MORNING
Qty: 7 TABLET | Refills: 0 | Status: SHIPPED | OUTPATIENT
Start: 2018-08-28 | End: 2018-09-25

## 2018-08-28 NOTE — PROGRESS NOTES
"SUBJECTIVE/OBJECTIVE:                           Dominic Workman is a 23 year old male coming in for a follow up visit for Medication Therapy Management.  He was referred to me from ZARA Hernández CNP.    Chief Complaint: \"I'd like to switch medications\"    Allergies/ADRs: Reviewed in Epic  Tobacco: No tobacco use  Alcohol: not currently using  Activity: minimal  Substances: smokes marijuana at least once daily, with inconsistent strains  PMH: Reviewed in Epic    Medication Adherence/Access:  no issues reported    Depression/Anxiety: Pt had been taking bupropion ER 300mg daily since increasing from 150mg daily about five weeks ago.  He noted that mood is somewhat stable, but when it does fluctuate, \"the highs are high and the lows are pretty low.\"  He described feeling quite low about two weeks ago, which lasted 2-3 days, then improved to baseline. He does not feel that the dose change has been very helpful, but he has also not experienced side effects.  Anxiety has been stable over the last month, though pt anticipates an increase after school starts next week, which leads him to request a medication change.  He noted that school, in general, does not cause anxiety, but he has difficulty with balancing increased stress levels of coursework.  He will be taking two classes in the upcoming semester.  Sleep has improved since he started consistently taking the bupropion in the morning about one month ago.  He is going to bed around midnight-1am and sleeping 7-12 hours, depending on duties the next day.  He did report feeling \"good and rested\" with 7-8 hours sleep, but very tired if he sleeps 10-12 hours.    Past medication trials:  Fluoxetine: 1/2017-2/2017 up to 20mg daily; caused migraines, sexual side effects  Citalopram: 2/2017-12/2017 up to 20mg daily; caused irritability, sexual side effects  Sertraline: 10/2017-7/2018 up to 50mg daily; \"lots of side effects,\" including low mood; used in combo with bupropion " "2-7/2018  Bupropion: 2/2018-8/2018 up to ER 300mg daily; increased intensity of mood fluctuations; most helpful for mood    Migraines: Patient reported having 3-4 migraines since last visit one month ago, with half of them being severe.  Migraines occur bilaterally above his eyes and he experiences significant phono and photosensitivity.  He reported that \"minor migraines\" are relieved with marijuana and rest, though he requires Excedrin Migraine (2 tabs) for higher intensity migraines, followed by rest, which is helpful.  He has not tried a prophylactic medication since childhood (Depakote) and is unsure about it today.    BP Readings from Last 3 Encounters:   08/28/18 133/88   07/20/18 136/86   06/15/18 136/82     ASSESSMENT:                             Current medications were reviewed today.     Medication Adherence: excellent, no issues identified    Depression/Anxiety: Patient has had six month trial with bupropion, though only one month at 300mg dose.  With upcoming school year, it seems appropriate to switch medications for improved mood stability.  Discussed SNRI medication class, specifically venlafaxine, and reviewed dosing and possible side effects, including dry mouth, headache, possibly increased blood pressure, and/or increased anxiety, especially upon dose increases.  Discussed ongoing blood pressure monitoring, though significant increases are unlikely.  Pt expressed understanding and acceptance of side effects.  Pt will reduce bupropion to 150mg daily and start venlafaxine ER 75mg daily, stopping bupropion after 7 days.  Will follow up with patient in one week to check in with dose change, followed by one month follow up.  Pt due to schedule with PCP in October- reminder provided to patient.    Migraines: Continue using Excedrin Migraine.  Again reviewed risks of marijuana negatively impacting mental health stability-pt acknowledged. Propranolol could be useful in the future, which could also help " blood pressure.  It may also be useful for anxiety, though is typically most beneficial for physical symptoms, which this patient denied.  Will continue to follow.    PLAN:                            1. Decrease bupropion ER to 150mg daily x 7 days and start venlafaxine ER 75mg daily.  2. Pt will schedule PCP visit for October.    I spent 30 minutes with this patient today. All changes were made per collaborative practice agreement with ZARA Hernández CNP. A copy of the visit note was provided to the patient's primary care provider.    Will follow up in 4 weeks.    The patient was given a summary of these recommendations as an after visit summary.     Cristy Palmer, PharmD  Medication Therapy Management Pharmacist  [facility]  Phone: [phone]

## 2018-08-28 NOTE — MR AVS SNAPSHOT
After Visit Summary   8/28/2018    Dominic Workman    MRN: [id number]           Patient Information     Date Of Birth          1995        Visit Information        Provider Department      [facility]/28/2018 2:00 [facility]        Today's Diagnoses     Major depressive disorder, recurrent episode, moderate (H)        ZOILA (generalized anxiety disorder)          Care Instructions    Recommendations from today's MTM visit:                                                        1. Reduce bupropion to 150mg in the morning x 7 days, then stop. Start taking venlafaxine (Effexor) ER 75mg every morning.    Next MTM visit: I will call you in the afternoon on 9/4 to check in on the dose change.  We will follow up in clinic on Tuesday, 9/25/18 at 1pm.    To schedule another MTM appointment, please call the clinic directly or you may call the MTM scheduling line at [phone] or toll-free at [phone].     My Clinical Pharmacist's contact information:                                                      It was a pleasure seeing you today!  Please feel free to contact me with any questions or concerns you have.      Cristy Palmer, PharmD  Medication Therapy Management Pharmacist  [facility]  Phone: [phone]    You may receive a survey about the MTM services you received.  I would appreciate your feedback to help me serve you better in the future. Please fill it out and return it when you can. Your comments will be anonymous.            Follow-ups after your visit        Your next 10 appointments already scheduled     Sep 25, 2018  1:00 PM CDT   SHORT with Cristy Palmer RPH   [facility] ([facility])    [address] [place] [id number] [phone]              Who to contact     If you have questions or  need follow up information about today's clinic visit or your schedule please contact [facility] directly at [phone].  Normal or non-critical lab and imaging results will be communicated to you by [employer], letter or phone within 4 business days after the clinic has received the results. If you do not hear from us within 7 days, please contact the clinic through [employer] or phone. If you have a critical or abnormal lab result, we will notify you by phone as soon as possible.  Submit refill requests through [employer] or call your pharmacy and they will forward the refill request to us. Please allow 3 business days for your refill to be completed.          Additional Information About Your Visit        [employer] Information     [employer] gives you secure access to your electronic health record. If you see a primary care provider, you can also send messages to your care team and make appointments. If you have questions, please call your primary care clinic.  If you do not have a primary care provider, please call [phone] and they will assist you.        Care EveryWhere ID     This is your Care EveryWhere ID. This could be used by other organizations to access your [place] medical records  [id number]         Blood Pressure from Last 3 Encounters:   07/20/18 136/86   06/15/18 136/82   12/22/17 142/86    Weight from Last 3 Encounters:   07/20/18 209 lb 12.8 oz (95.2 kg)   06/15/18 214 lb 1.6 oz (97.1 kg)   12/22/17 186 lb 9.6 oz (84.6 kg)              Today, you had the following     No orders found for display         Today's Medication Changes          These changes are accurate as of 8/28/18  2:37 PM.  If you have any questions, ask your nurse or doctor.               Start taking these medicines.        Dose/Directions    venlafaxine 75 MG 24 hr capsule   Commonly known as:  EFFEXOR-XR   Used for:  Major depressive disorder, recurrent episode, moderate (H)        Dose:  75 mg    Take 1 capsule (75 mg) by mouth daily   Quantity:  30 capsule   Refills:  0         These medicines have changed or have updated prescriptions.        Dose/Directions    buPROPion 150 MG 24 hr tablet   Commonly known as:  WELLBUTRIN XL   This may have changed:    - medication strength  - how much to take   Used for:  Major depressive disorder, recurrent episode, moderate (H), ZOILA (generalized anxiety disorder)        Dose:  150 mg   Take 1 tablet (150 mg) by mouth every morning   Quantity:  7 tablet   Refills:  0            Where to get your medicines      These medications were sent to [id number] - [place], [place] - [address] AT [facility]  [address], [place] [address]    Hours:  24-hours Phone:  [phone]     buPROPion 150 MG 24 hr tablet    venlafaxine 75 MG 24 hr capsule                Primary Care Provider Office Phone # Fax #    Julia LYNNE Erasto, APRN [facility] [phone]       [address] [address]        Equal Access to Services     [facility]: Hadii aad ku hadasho Soomaali, waaxda luqadaha, qaybta kaalmada adeegyada, waxay idiin hayshaniqua griffiths . So [facility] [phone].    ATENCIÓN: Si habla español, tiene a espinosa disposición servicios gratuitos de asistencia lingüística. [facility] [phone].    We comply with applicable federal civil rights laws and [place] laws. We do not discriminate on the basis of race, color, national origin, age, disability, sex, sexual orientation, or gender identity.            Thank you!     Thank you for choosing [facility]  for your care. Our goal is always to provide you with excellent care. Hearing back from our patients is one way we can continue to improve our services. Please take a few minutes to complete the written survey that you may receive in the mail after your visit with us. Thank you!             Your Updated Medication List -  Protect others around you: Learn how to safely use, store and throw away your medicines at www.disposemymeds.org.          This list is accurate as of 8/28/18  2:37 PM.  Always use your most recent med list.                   Brand Name Dispense Instructions for use Diagnosis    aspirin-acetaminophen-caffeine 250-250-65 MG per tablet    EXCEDRIN MIGRAINE     Take 1 tablet by mouth every 6 hours as needed for headaches        buPROPion 150 MG 24 hr tablet    WELLBUTRIN XL    7 tablet    Take 1 tablet (150 mg) by mouth every morning    Major depressive disorder, recurrent episode, moderate (H), ZOILA (generalized anxiety disorder)       cholecalciferol 1000 UNIT tablet    vitamin D3     Take 1,000 Units by mouth daily        venlafaxine 75 MG 24 hr capsule    EFFEXOR-XR    30 capsule    Take 1 capsule (75 mg) by mouth daily    Major depressive disorder, recurrent episode, moderate (H)

## 2018-08-28 NOTE — Clinical Note
Nicholas Keyes,  I met with Dominic yesterday and we decided to switch bupropion to venlafaxine.  Please see my note for details and let me know what questions you have.  I'll be calling him next week to check in and then again in one month.  Thanks, Cristy

## 2018-08-28 NOTE — PATIENT INSTRUCTIONS
Recommendations from today's MTM visit:                                                        1. Reduce bupropion to 150mg in the morning x 7 days, then stop. Start taking venlafaxine (Effexor) ER 75mg every morning.    Next MTM visit: I will call you in the afternoon on 9/4 to check in on the dose change.  We will follow up in clinic on Tuesday, 9/25/18 at 1pm.    To schedule another MTM appointment, please call the clinic directly or you may call the MTM scheduling line at [phone] or toll-free at [phone].     My Clinical Pharmacist's contact information:                                                      It was a pleasure seeing you today!  Please feel free to contact me with any questions or concerns you have.      Cristy Palmer, PharmD  Medication Therapy Management Pharmacist  [facility]  Phone: [phone]    You may receive a survey about the MTM services you received.  I would appreciate your feedback to help me serve you better in the future. Please fill it out and return it when you can. Your comments will be anonymous.

## 2018-08-29 VITALS — DIASTOLIC BLOOD PRESSURE: 88 MMHG | HEART RATE: 67 BPM | SYSTOLIC BLOOD PRESSURE: 133 MMHG

## 2018-09-04 ENCOUNTER — TELEPHONE (OUTPATIENT)
Dept: PHARMACY | Facility: CLINIC | Age: 23
End: 2018-09-04

## 2018-09-04 ENCOUNTER — APPOINTMENT (OUTPATIENT)
Dept: PHARMACY | Facility: CLINIC | Age: 23
End: 2018-09-04
Payer: COMMERCIAL

## 2018-09-04 NOTE — TELEPHONE ENCOUNTER
"Planned one week follow up visit after medication change, though insurance does not cover telemedicine visits, so placed brief check in call to patient instead.    Pt has taken bupropion ER 150mg once daily x 7 days (reduction from 300mg daily) and venlafaxine ER 75mg daily x 7 days.  He reported feeling some restlessness, sweating, and drowsiness for the first 2-3 days, though these symptoms have mostly resolved.  He also noted some sexual side effects, which are also improved.  Patient reported \"feeling pretty good!\" over the last 2-3 days since side effects have improved and is looking forward to continuing venlafaxine.    Discussed possibility of feeling additional side effects over the next few days, after bupropion is fully discontinued and to let me know if they worsen or if he needs longer taper.  Follow up MTM appointment scheduled for 3 weeks (9/25).  Encouraged patient to call sooner should side effects or mood worsen.  Pt agreed.    Cristy Palmer, PharmD  Medication Therapy Management Pharmacist  [facility]  Phone: [phone]  "

## 2018-09-25 ENCOUNTER — OFFICE VISIT (OUTPATIENT)
Dept: PHARMACY | Facility: CLINIC | Age: 23
End: 2018-09-25
Payer: COMMERCIAL

## 2018-09-25 VITALS — HEART RATE: 91 BPM | DIASTOLIC BLOOD PRESSURE: 76 MMHG | SYSTOLIC BLOOD PRESSURE: 126 MMHG

## 2018-09-25 DIAGNOSIS — F33.1 MAJOR DEPRESSIVE DISORDER, RECURRENT EPISODE, MODERATE (H): ICD-10-CM

## 2018-09-25 PROCEDURE — 99606 MTMS BY PHARM EST 15 MIN: CPT | Performed by: PHARMACIST

## 2018-09-25 PROCEDURE — 99607 MTMS BY PHARM ADDL 15 MIN: CPT | Performed by: PHARMACIST

## 2018-09-25 RX ORDER — VENLAFAXINE HYDROCHLORIDE 150 MG/1
150 CAPSULE, EXTENDED RELEASE ORAL DAILY
Qty: 90 CAPSULE | Refills: 0 | Status: SHIPPED | OUTPATIENT
Start: 2018-09-25 | End: 2019-01-04

## 2018-09-25 NOTE — MR AVS SNAPSHOT
After Visit Summary   9/25/2018    Dominic Workman    MRN: [id number]           Patient Information     Date Of Birth          1995        Visit Information        Provider Department      9/25/2018 1:00 PM Cristy Palmer RPH [facility]        Today's Diagnoses     Major depressive disorder, recurrent episode, moderate (H)          Care Instructions    Recommendations from today's MTM visit:                                                        1. Increase venlafaxine to 150mg daily- [facility]    Next MTM visit: 12/18/18 at 1pm    To schedule another MTM appointment, please call the clinic directly or you may call the MTM scheduling line at [phone] or toll-free at [phone].     My Clinical Pharmacist's contact information:                                                      It was a pleasure seeing you today!  Please feel free to contact me with any questions or concerns you have.      Cristy Palmer, PharmD  Medication Therapy Management Pharmacist  [facility]  Phone: [phone]    You may receive a survey about the MTM services you received.  I would appreciate your feedback to help me serve you better in the future. Please fill it out and return it when you can. Your comments will be anonymous.              Follow-ups after your visit        Your next 10 appointments already scheduled     Dec 18, 2018  1:00 PM CST   SHORT with Cristy Palmer RPH   [facility] ([facility])    [address] [place] [address] [phone]              Who to contact     If you have questions or need follow up information about today's clinic visit or your schedule please contact [facility] directly at [phone].  Normal or non-critical lab and imaging results  will be communicated to you by [employer], letter or phone within 4 business days after the clinic has received the results. If you do not hear from us within 7 days, please contact the clinic through [employer] or phone. If you have a critical or abnormal lab result, we will notify you by phone as soon as possible.  Submit refill requests through [employer] or call your pharmacy and they will forward the refill request to us. Please allow 3 business days for your refill to be completed.          Additional Information About Your Visit        [employer] Information     [employer] gives you secure access to your electronic health record. If you see a primary care provider, you can also send messages to your care team and make appointments. If you have questions, please call your primary care clinic.  If you do not have a primary care provider, please call [phone] and they will assist you.        Care EveryWhere ID     This is your Care EveryWhere ID. This could be used by other organizations to access your [place] medical records  [id number]         Blood Pressure from Last 3 Encounters:   08/28/18 133/88   07/20/18 136/86   06/15/18 136/82    Weight from Last 3 Encounters:   07/20/18 209 lb 12.8 oz (95.2 kg)   06/15/18 214 lb 1.6 oz (97.1 kg)   12/22/17 186 lb 9.6 oz (84.6 kg)              Today, you had the following     No orders found for display         Today's Medication Changes          These changes are accurate as of 9/25/18  1:18 PM.  If you have any questions, ask your nurse or doctor.               These medicines have changed or have updated prescriptions.        Dose/Directions    venlafaxine 150 MG 24 hr capsule   Commonly known as:  EFFEXOR-XR   This may have changed:    - medication strength  - how much to take   Used for:  Major depressive disorder, recurrent episode, moderate (H)        Dose:  150 mg   Take 1 capsule (150 mg) by mouth daily   Quantity:  90 capsule   Refills:  0            Where to get  your medicines      These medications were sent to [id number] - [place], [place] - [address] AT [facility]  [address], [place] [address]    Hours:  24-hours Phone:  [phone]     venlafaxine 150 MG 24 hr capsule                Primary Care Provider Office Phone # Fax #    Julia Maurer, APRN [facility] [phone]       [address] [facility] [address]  [facility] [address]        Equal Access to Services     [facility]: Hadii aad ku hadasho Soomaali, waaxda luqadaha, qaybta kaalmada adeegyada, waxay idiin hayaan aderafael griffiths . So [facility] [phone].    ATENCIÓN: Si habla español, tiene a espinosa disposición servicios gratuitos de asistencia lingüística. [facility] [phone].    We comply with applicable federal civil rights laws and [place] laws. We do not discriminate on the basis of race, color, national origin, age, disability, sex, sexual orientation, or gender identity.            Thank you!     Thank you for choosing [facility]  for your care. Our goal is always to provide you with excellent care. Hearing back from our patients is one way we can continue to improve our services. Please take a few minutes to complete the written survey that you may receive in the mail after your visit with us. Thank you!             Your Updated Medication List - Protect others around you: Learn how to safely use, store and throw away your medicines at www.disposemymeds.org.          This list is accurate as of 9/25/18  1:18 PM.  Always use your most recent med list.                   Brand Name Dispense Instructions for use Diagnosis    aspirin-acetaminophen-caffeine 250-250-65 MG per tablet    EXCEDRIN MIGRAINE     Take 1 tablet by mouth every 6 hours as needed for headaches        cholecalciferol 1000 UNIT tablet    vitamin D3     Take 1,000 Units by mouth daily        venlafaxine 150 MG 24 hr capsule    EFFEXOR-XR    90 capsule     Take 1 capsule (150 mg) by mouth daily    Major depressive disorder, recurrent episode, moderate (H)

## 2018-09-25 NOTE — PATIENT INSTRUCTIONS
Recommendations from today's MTM visit:                                                        1. Increase venlafaxine to 150mg daily- prescription sent to [facility].    Next MTM visit: 12/18/18 at 1pm    To schedule another MTM appointment, please call the clinic directly or you may call the MTM scheduling line at [phone] or toll-free at [phone].     My Clinical Pharmacist's contact information:                                                      It was a pleasure seeing you today!  Please feel free to contact me with any questions or concerns you have.      Cristy Palmer, PharmD  Medication Therapy Management Pharmacist  [facility]  Phone: [phone]    You may receive a survey about the MTM services you received.  I would appreciate your feedback to help me serve you better in the future. Please fill it out and return it when you can. Your comments will be anonymous.

## 2018-09-25 NOTE — Clinical Note
elveri- he seems to be doing much better and tolerating venlafaxine without too much trouble (side effects are improving).  We increased to 150mg today and I'll see him back in three months.  Please let me know if you have questions! Cristy

## 2018-09-25 NOTE — PROGRESS NOTES
"SUBJECTIVE/OBJECTIVE:                           Dominic Workman is a 23 year old male coming in for a follow up visit for Medication Therapy Management.  He was referred to me from ZARA Hernández CNP.    Chief Complaint: \"I'm feeling pretty good\"    Allergies/ADRs: Reviewed in Epic  Tobacco: No tobacco use  Alcohol: not currently using  Activity: minimal  Substances: smokes marijuana at least once daily, with inconsistent strains  PMH: Reviewed in Epic    Medication Adherence/Access:  no issues reported    Depression: Pt completed a switch from bupropion to venlafaxine about three weeks ago and noted that his mood has felt much more stable.  He reported having some nausea and restless legs at night while still taking both medications during a cross taper, but these have resolved.  He did notice an increase in sweating, which would occur and random a few weeks ago, but now he mostly notices that he sweats a little more than usual during normal circumstances when sweating would be appropriate.  Lastly, he noted having some difficulty achieving orgasm during intercourse, though thinks it has been improving since venlafaxine was started.  He described his overall mood being a 7 out of 10 and recalls having one or two \"bad days\" over the last month, when he had difficulty getting out of bed.  He reported sleep is much improved and generally feels rested upon waking.    Past medication trials:  Fluoxetine: 1/2017-2/2017 up to 20mg daily; caused migraines, sexual side effects  Citalopram: 2/2017-12/2017 up to 20mg daily; caused irritability, sexual side effects  Sertraline: 10/2017-7/2018 up to 50mg daily; \"lots of side effects,\" including low mood; used in combo with bupropion 2-7/2018  Bupropion: 2/2018-8/2018 up to ER 300mg daily; increased intensity of mood fluctuations; most helpful for mood; helped relieve sexual SE of other meds    BP Readings from Last 3 Encounters:   09/25/18 126/76   08/28/18 133/88 "   07/20/18 136/86     ASSESSMENT:                             Current medications were reviewed today.     Medication Adherence: excellent, no issues identified    Depression: Reviewed venlafaxine adverse effects and validated his current concerns.  Discussed likelihood of continued side effect improvement, though may experience acute changes with dose increases.  Pt may benefit from dose increase to further target depression management-pt agreeable.      PLAN:                            1. Increase to venlafaxine ER 150mg daily- Rx sent.    I spent 30 minutes with this patient today. All changes were made per collaborative practice agreement with ZARA Hernández CNP. A copy of the visit note was provided to the patient's primary care provider.    Will follow up in 3 months or sooner if needed.    The patient was given a summary of these recommendations as an after visit summary.     Cristy Palmer, PharmD  Medication Therapy Management Pharmacist  [facility]  Phone: [phone]

## 2018-12-18 ENCOUNTER — TELEPHONE (OUTPATIENT)
Dept: PHARMACY | Facility: CLINIC | Age: 23
End: 2018-12-18

## 2019-01-04 DIAGNOSIS — F33.1 MAJOR DEPRESSIVE DISORDER, RECURRENT EPISODE, MODERATE (H): ICD-10-CM

## 2019-01-04 RX ORDER — VENLAFAXINE HYDROCHLORIDE 150 MG/1
150 CAPSULE, EXTENDED RELEASE ORAL DAILY
Qty: 90 CAPSULE | Refills: 0 | Status: SHIPPED | OUTPATIENT
Start: 2019-01-04 | End: 2019-01-11

## 2019-01-04 ASSESSMENT — PATIENT HEALTH QUESTIONNAIRE - PHQ9: SUM OF ALL RESPONSES TO PHQ QUESTIONS 1-9: 5

## 2019-01-04 NOTE — TELEPHONE ENCOUNTER
Stephy,  Please review/sign or advise for refill request of: venlafaxine (EFFEXOR-XR) 150 MG 24 hr capsule    Routing as high prioitry as patient is completely out of medication    Routing refill request to provider for review/approval because:  Labs not current:  Creat not on file    Called patient, scheduled f/u 01/11/2019, updated PHQ-9 score: 5    Thank You!  tuberculosis

## 2019-01-10 NOTE — PROGRESS NOTES
SUBJECTIVE:   Dominic Workman is a 23 year old male who presents to clinic today for the following health issues:    Depression and Anxiety Follow-Up    Status since last visit: Improved - biggest change he has felt out of any of the medications he has taken.  Went up to Effexor  mg 3 months ago.  Initially really good and now a little settled.  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  This semester is either last or second to last semester.  Wt Readings from Last 5 Encounters:   01/11/19 93.2 kg (205 lb 6.4 oz)   07/20/18 95.2 kg (209 lb 12.8 oz)   06/15/18 97.1 kg (214 lb 1.6 oz)   12/22/17 84.6 kg (186 lb 9.6 oz)   10/18/17 84.4 kg (186 lb 1.6 oz)     PHQ 7/20/2018 1/4/2019 1/11/2019   PHQ-9 Total Score 16 5 8   Q9: Suicide Ideation Several days Not at all Not at all   F/U: Thoughts of suicide or self-harm No - -   F/U: Self harm-plan - - -   F/U: Self-harm action - - -   F/U: Safety concerns No - -     ZOILA-7 SCORE 6/15/2018 7/20/2018 1/11/2019   Total Score 10 14 10     In the past two weeks have you had thoughts of suicide or self-harm?  No.    Do you have concerns about your personal safety or the safety of others?   No  PHQ-9  English  PHQ-9   Any Language  ZOILA-7  Suicide Assessment Five-step Evaluation and Treatment (SAFE-T)    Amount of exercise or physical activity: None    Problems taking medications regularly: No    Medication side effects: none    Diet: regular (no restrictions)      Problem list and histories reviewed & adjusted, as indicated.  Additional history: as documented    Reviewed and updated as needed this visit by clinical staff  Tobacco  Allergies  Meds  Med Hx  Surg Hx  Fam Hx  Soc Hx      Reviewed and updated as needed this visit by Provider      "    ROS:  Constitutional, HEENT, cardiovascular, pulmonary, gi and gu systems are negative, except as otherwise noted.    OBJECTIVE:     /84   Pulse 104   Temp 98  F (36.7  C) (Oral)   Ht 1.79 m (5' 10.47\")   Wt 93.2 kg (205 lb 6.4 oz)   SpO2 96%   BMI 29.08 kg/m    Body mass index is 29.08 kg/m .  GENERAL: healthy, alert and no distress  NECK: no adenopathy, no asymmetry, masses, or scars and thyroid normal to palpation  RESP: lungs clear to auscultation - no rales, rhonchi or wheezes  CV: regular rate and rhythm, normal S1 S2, no S3 or S4, no murmur, click or rub, no peripheral edema and peripheral pulses strong  PSYCH: mentation appears normal, affect normal/bright    Diagnostic Test Results:  none     ASSESSMENT/PLAN:     Depression; recurrent episode-- Partial remission   Associated with the following complications:    None   Plan:  Medications:   SNRI  - increase      (F33.1) Major depressive disorder, recurrent episode, moderate (H)  Comment:   Plan: venlafaxine (EFFEXOR-ER) 225 MG 24 hr tablet              Patient Instructions   Increase effexor XR to 225 mg  Return in three months    Try magnesium glycinate 600 mg at bedtime          ZARA Brand [facility]    Please abstract the following data from this visit with this patient into the appropriate field in Epic:  Adacel (Tdap) done on  this date: 3/29/19 (approximately)  "

## 2019-01-11 ENCOUNTER — OFFICE VISIT (OUTPATIENT)
Dept: FAMILY MEDICINE | Facility: CLINIC | Age: 24
End: 2019-01-11
Payer: COMMERCIAL

## 2019-01-11 VITALS
TEMPERATURE: 98 F | BODY MASS INDEX: 29.41 KG/M2 | WEIGHT: 205.4 LBS | OXYGEN SATURATION: 96 % | SYSTOLIC BLOOD PRESSURE: 138 MMHG | HEIGHT: 70 IN | HEART RATE: 104 BPM | DIASTOLIC BLOOD PRESSURE: 84 MMHG

## 2019-01-11 DIAGNOSIS — F33.1 MAJOR DEPRESSIVE DISORDER, RECURRENT EPISODE, MODERATE (H): ICD-10-CM

## 2019-01-11 PROCEDURE — 99214 OFFICE O/P EST MOD 30 MIN: CPT | Performed by: NURSE PRACTITIONER

## 2019-01-11 RX ORDER — VENLAFAXINE HYDROCHLORIDE 225 MG/1
225 TABLET, EXTENDED RELEASE ORAL DAILY
Qty: 90 TABLET | Refills: 0 | Status: SHIPPED | OUTPATIENT
Start: 2019-01-11 | End: 2019-04-04

## 2019-01-11 ASSESSMENT — ANXIETY QUESTIONNAIRES
3. WORRYING TOO MUCH ABOUT DIFFERENT THINGS: MORE THAN HALF THE DAYS
7. FEELING AFRAID AS IF SOMETHING AWFUL MIGHT HAPPEN: SEVERAL DAYS
1. FEELING NERVOUS, ANXIOUS, OR ON EDGE: MORE THAN HALF THE DAYS
2. NOT BEING ABLE TO STOP OR CONTROL WORRYING: MORE THAN HALF THE DAYS
IF YOU CHECKED OFF ANY PROBLEMS ON THIS QUESTIONNAIRE, HOW DIFFICULT HAVE THESE PROBLEMS MADE IT FOR YOU TO DO YOUR WORK, TAKE CARE OF THINGS AT HOME, OR GET ALONG WITH OTHER PEOPLE: SOMEWHAT DIFFICULT
6. BECOMING EASILY ANNOYED OR IRRITABLE: SEVERAL DAYS
5. BEING SO RESTLESS THAT IT IS HARD TO SIT STILL: NOT AT ALL
GAD7 TOTAL SCORE: 10

## 2019-01-11 ASSESSMENT — PATIENT HEALTH QUESTIONNAIRE - PHQ9
5. POOR APPETITE OR OVEREATING: MORE THAN HALF THE DAYS
SUM OF ALL RESPONSES TO PHQ QUESTIONS 1-9: 8

## 2019-01-11 ASSESSMENT — MIFFLIN-ST. JEOR: SCORE: 1940.43

## 2019-01-11 NOTE — Clinical Note
Please abstract the following data from this visit with this patient into the appropriate field in Epic:Adacel (Tdap) done on  this date: 3/29/19 (approximately)

## 2019-01-11 NOTE — PATIENT INSTRUCTIONS
Increase effexor XR to 225 mg  Return in three months    Try magnesium glycinate 600 mg at bedtime

## 2019-01-12 ASSESSMENT — ANXIETY QUESTIONNAIRES: GAD7 TOTAL SCORE: 10

## 2019-04-04 DIAGNOSIS — F33.1 MAJOR DEPRESSIVE DISORDER, RECURRENT EPISODE, MODERATE (H): ICD-10-CM

## 2019-04-04 RX ORDER — VENLAFAXINE HYDROCHLORIDE 225 MG/1
225 TABLET, EXTENDED RELEASE ORAL DAILY
Qty: 30 TABLET | Refills: 0 | Status: SHIPPED | OUTPATIENT
Start: 2019-04-04 | End: 2019-04-19

## 2019-04-04 NOTE — TELEPHONE ENCOUNTER
Medication is being filled for 1 time refill only due to:  Patient needs to be seen because follow up from LOV.    Called patient, informed patient he is due for follow up visit. Patient verbalized understanding. Appointment scheduled 04/19/2019    Marilu Mills RN  Triage Nurse

## 2019-04-04 NOTE — TELEPHONE ENCOUNTER
"Requested Prescriptions   Pending Prescriptions Disp Refills     venlafaxine (EFFEXOR-ER) 225 MG 24 hr tablet 90 tablet 0    Last Written Prescription Date:  1/11/2019  Last Fill Quantity: 90,  # refills: 0   Last office visit: 1/11/2019 with prescribing provider:  01/11/2019   Future Office Visit:   Sig: Take 1 tablet (225 mg) by mouth daily    Serotonin-Norepinephrine Reuptake Inhibitors  Failed - 4/4/2019  9:31 AM       Failed - PHQ-9 score of less than 5 in past 6 months    Please review last PHQ-9 score.          Failed - Normal serum creatinine on file in past 12 months    No lab results found.         Passed - Blood pressure under 140/90 in past 12 months    BP Readings from Last 3 Encounters:   01/11/19 138/84   09/25/18 126/76   08/28/18 133/88                Passed - Medication is active on med list       Passed - Patient is age 18 or older       Passed - Recent (6 mo) or future (30 days) visit within the authorizing provider's specialty    Patient had office visit in the last 6 months or has a visit in the next 30 days with authorizing provider or within the authorizing provider's specialty.  See \"Patient Info\" tab in inbasket, or \"Choose Columns\" in Meds & Orders section of the refill encounter.            "

## 2019-04-18 NOTE — PROGRESS NOTES
"  SUBJECTIVE:   Dominic Workman is a 24 year old male who presents to clinic today for the following   health issues:    Depression and Anxiety Follow-Up    Status since last visit: Improved  - likes the effexor a lot, was able to increase to 225 mg about a month - has noticed improvement in mood, motivation, getting out of bed, anxiety symptoms. Side effects increased slightly with increase and then levelled off.  Currently a little less interest in sex.  Every once in awhile has \"what if\" content suicidal thoughts.    Working this semester and not in school.  Working at [employer] on [employer].  Taking physics this summer, fall likely last semester.  Intentional weight loss (less going out to eat and making own food, better portion control, eating breakfast) through diet and exercise    Therapist taking sabbatical in [place] and has changed to a new therapist - Omar Something    Other associated symptoms:  None    Complicating factors:     Significant life event: No     Current substance abuse: None    PHQ 1/4/2019 1/11/2019 4/19/2019   PHQ-9 Total Score 5 8 7   Q9: Thoughts of better off dead/self-harm past 2 weeks Not at all Not at all Several days   F/U: Thoughts of suicide or self-harm - - No   F/U: Self harm-plan - - -   F/U: Self-harm action - - -   F/U: Safety concerns - - No     ZOILA-7 SCORE 7/20/2018 1/11/2019 4/19/2019   Total Score 14 10 4     In the past two weeks have you had thoughts of suicide or self-harm?  Yes  In the past two weeks have you thought of a plan or intent to harm yourself? No.  Do you have concerns about your personal safety or the safety of others?   No  PHQ-9  English  PHQ-9   Any Language  ZOILA-7  Suicide Assessment Five-step Evaluation and Treatment (SAFE-T)      Amount of exercise or physical activity: 2-3 days/week for an average of 15-30 minutes    Problems taking medications regularly: No    Medication side effects: none    Diet: regular (no restrictions)    Additional " "history: as documented    Reviewed  and updated as needed this visit by clinical staff  Tobacco  Allergies  Meds  Med Hx  Surg Hx  Fam Hx  Soc Hx        Reviewed and updated as needed this visit by Provider        Wt Readings from Last 5 Encounters:   04/19/19 89 kg (196 lb 1.6 oz)   01/11/19 93.2 kg (205 lb 6.4 oz)   07/20/18 95.2 kg (209 lb 12.8 oz)   06/15/18 97.1 kg (214 lb 1.6 oz)   12/22/17 84.6 kg (186 lb 9.6 oz)         ROS:  Constitutional, HEENT, cardiovascular, pulmonary, gi and gu systems are negative, except as otherwise noted.    OBJECTIVE:     /82   Pulse 65   Temp 98.9  F (37.2  C) (Oral)   Wt 89 kg (196 lb 1.6 oz)   SpO2 96%   BMI 27.76 kg/m    Body mass index is 27.76 kg/m .  GENERAL: healthy, alert and no distress  MENTAL STATUS EXAM  Appearance: appropriate  Attitude: cooperative  Behavior: normal  Eye Contact: normal  Speech: normal  Orientation: oreinted to person , place, time and situation  Mood:  Happy, \"up\"  Affect: Mood Congruent  Thought Process: clear  Suicidal Ideation: reports minimal thoughts, no intention  Hallucination: no    Diagnostic Test Results:  none     ASSESSMENT/PLAN:     Depression; recurrent episode-- Full remission   Associated with the following complications:    zoila   Plan:  No changes in the patient's current treatment plan          (F33.1) Major depressive disorder, recurrent episode, moderate (H)  (primary encounter diagnosis)  Comment:   Plan: venlafaxine (EFFEXOR-ER) 225 MG 24 hr tablet            (F41.1) ZOILA (generalized anxiety disorder)  Comment:   Plan:     Patient Instructions   [facility] - [phone]  Ana Maria Mireles or Deneen Martinez at [facility], Jameel Leung or Jaky Barragan at [facility]      Outside of [facility]  Sallie David - grief  Idaniakamini Richmond - trauma, EMDR  [phone]    [facility]  Christy Myles  [phone]    Landon Long  [phone]        ZARA Brand [facility]

## 2019-04-19 ENCOUNTER — OFFICE VISIT (OUTPATIENT)
Dept: FAMILY MEDICINE | Facility: CLINIC | Age: 24
End: 2019-04-19
Payer: COMMERCIAL

## 2019-04-19 VITALS
SYSTOLIC BLOOD PRESSURE: 126 MMHG | DIASTOLIC BLOOD PRESSURE: 82 MMHG | HEART RATE: 65 BPM | WEIGHT: 196.1 LBS | OXYGEN SATURATION: 96 % | BODY MASS INDEX: 27.76 KG/M2 | TEMPERATURE: 98.9 F

## 2019-04-19 DIAGNOSIS — F41.1 GAD (GENERALIZED ANXIETY DISORDER): ICD-10-CM

## 2019-04-19 DIAGNOSIS — F33.1 MAJOR DEPRESSIVE DISORDER, RECURRENT EPISODE, MODERATE (H): Primary | ICD-10-CM

## 2019-04-19 PROCEDURE — 99213 OFFICE O/P EST LOW 20 MIN: CPT | Performed by: NURSE PRACTITIONER

## 2019-04-19 RX ORDER — VENLAFAXINE HYDROCHLORIDE 225 MG/1
225 TABLET, EXTENDED RELEASE ORAL DAILY
Qty: 90 TABLET | Refills: 1 | Status: SHIPPED | OUTPATIENT
Start: 2019-04-19 | End: 2019-10-21

## 2019-04-19 ASSESSMENT — PATIENT HEALTH QUESTIONNAIRE - PHQ9
5. POOR APPETITE OR OVEREATING: NOT AT ALL
SUM OF ALL RESPONSES TO PHQ QUESTIONS 1-9: 4

## 2019-04-19 ASSESSMENT — ANXIETY QUESTIONNAIRES
IF YOU CHECKED OFF ANY PROBLEMS ON THIS QUESTIONNAIRE, HOW DIFFICULT HAVE THESE PROBLEMS MADE IT FOR YOU TO DO YOUR WORK, TAKE CARE OF THINGS AT HOME, OR GET ALONG WITH OTHER PEOPLE: NOT DIFFICULT AT ALL
5. BEING SO RESTLESS THAT IT IS HARD TO SIT STILL: NOT AT ALL
2. NOT BEING ABLE TO STOP OR CONTROL WORRYING: SEVERAL DAYS
1. FEELING NERVOUS, ANXIOUS, OR ON EDGE: SEVERAL DAYS
3. WORRYING TOO MUCH ABOUT DIFFERENT THINGS: SEVERAL DAYS
6. BECOMING EASILY ANNOYED OR IRRITABLE: SEVERAL DAYS
7. FEELING AFRAID AS IF SOMETHING AWFUL MIGHT HAPPEN: NOT AT ALL
GAD7 TOTAL SCORE: 4

## 2019-04-19 NOTE — PATIENT INSTRUCTIONS
[facility] - [phone]  Ana Maria Mireles or Deneen Martinez at [facility], Jameel Leung or Jaky Barragan at [facility]      Outside of [facility]  Sallie David - grief  Idania Sindhuasa - trauma, EMDR  [phone]    [facility]  Christy Myles  [phone]    Landon Long  [phone]

## 2019-04-20 ASSESSMENT — ANXIETY QUESTIONNAIRES: GAD7 TOTAL SCORE: 4

## 2019-05-22 ENCOUNTER — BEH TREATMENT PLAN (OUTPATIENT)
Dept: BEHAVIORAL HEALTH | Facility: CLINIC | Age: 24
End: 2019-05-22
Attending: PSYCHIATRY & NEUROLOGY

## 2019-05-22 ENCOUNTER — HOSPITAL ENCOUNTER (OUTPATIENT)
Dept: BEHAVIORAL HEALTH | Facility: CLINIC | Age: 24
End: 2019-05-22
Attending: PSYCHIATRY & NEUROLOGY
Payer: COMMERCIAL

## 2019-05-22 DIAGNOSIS — F32.9 MAJOR DEPRESSIVE DISORDER: ICD-10-CM

## 2019-05-22 PROCEDURE — 90791 PSYCH DIAGNOSTIC EVALUATION: CPT

## 2019-05-22 ASSESSMENT — PAIN SCALES - GENERAL: PAINLEVEL: NO PAIN (0)

## 2019-05-22 NOTE — PROGRESS NOTES
"Initial Individual Treatment Plan     Patient: Dominic Workman   MRN: [id number]  : 1995  Age: 24 year old  Sex: male    Diagnostic Assessment Date / Date of Initial Individual Treatment Plan: 19      Immediate Health Concerns:  Yes. Identify health concern and plan to address: Migraines, takes medications as needed     Immediate Safety Concerns:  Yes. Identify safety concern and plan to address: Some parasuicidal thoughts, infrequent suicidal thoughts.  Same plans as in the past and no intent to act.  Will follow safety plan.      Identify the issues to be addressed in treatment:  Symptom Management, Personal Safety, Community Resources/Discharge Planning, Abstinence/Relapse Prevention, Develop / Improve Independent Living Skills, Develop Socialization / Interpersonal Relationship Skills and Physical Health     Client Initial Individualized Goals for Treatment: \"Get better at managing day to day stuff.  I want to be able to successfully live my life with symptoms\".    Initial Treatment suggestions for the client during the time between Diagnostic Assessment and completion of the Individualized Treatment Plan:  Follow Safety Plan  Abstain from Substance Use   Ask for more information, support and/or assistance as needed.  Follow up with providers/community supports as needed: PCP in 5 months, Individual therapist on leave right now,   Report increases or changes in symptoms to staff.  Report any personal safety concerns to staff.   Take medications as prescribed.  Report medication changes and/or side effects to staff.  Attend and participate in groups as scheduled or notify staff if unable to do so.  Report any use of substances to staff as this may impact your symptoms and/or personal safety.  Notify staff if you have any other issues that need to be addressed. This may include any current abuse / neglect / exploitation or other vulnerability.  Follow recommendations of your treatment team and discuss " concerns if not in agreement.     Treatment Team Responsible: Day Treatment (DT)      Therapeutic Interventions/Treatment Strategies may include:  Support, Redirection, Feedback, Limit/Boundaries, Safety Assessments, Structured Activity, Problem Solving, Clarification, Education, Motivational Enhancement and Relapse Prevention as needed.    Grace Camacho

## 2019-05-22 NOTE — PROGRESS NOTES
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  Thought Form:  logical, linear and goal oriented no loose associations  Insight:    fair    Judgment:     fair  Attention Span/Concentration: intact  Recent and Remote Memory:  intact      Psychiatric Diagnosis:    296.33 (F33.2) Major Depressive Disorder, Recurrent Episode, Severe _  300.02 (F41.1) Generalized Anxiety Disorder    Provisional Diagnostic Hypothesis (Explain R/O, other Provisional Diagnosis, and why alternative Diagnosis that were considered were ruled out):   Individual therapist and he have discussed Bipolar before but have so far ruled it out.  He reports periods of time where he is more impulsive with spending and substance use but denies other symptoms of victor hugo.  Continue to monitor for marijuana use disorder    Medical Concerns that may Impact Treatment:   Migraines    Psychosocial and Contextual Factors (V-Codes):  V61.20 Parent-child relational problem by history, have improved quite a bit    WHODAS 2.0 SCORE: 27.5/94.5       Client and family participation in assessment:   Dominic was alone during this assessment.   This assessment does not include collateral information.      Summary & Recommendations  Provide a brief summary of how diagnostic criteria is met (symptoms, duration & functional impairment), cause, prognosis, and likely consequences of symptoms. Include overview of pertinent client strengths, cultural influences, life situations, relationships,  health concerns and how diagnosis interacts/impacts with client's life. Recommendations include: client preferences, prioritization of needed mental health, ancillary or other services and any referrals to services required by statute or ruleXu Alfaro is a 24 year old, white single male. He moved back in with one of his moms about 6 months ago and was previously living in a house with friends.  He is taking a break from school where is he is a senior studying biology, society, and the environment.  He works about 4 hours a week at the [employer] but would like more hours so is looking for other jobs.  His supports include his moms, twin sister, and some friends.  His professional supports include his therapist (Cheikh Faye) who is on leave and his PCP (Julia Maurer) who is also his med provider.      Dominic is diagnosed with MDD and ZOILA.  Continue to monitor for bipolar related disorder and marijuana use disorder.  He reports he and his therapist have considered victor hugo/hypomania in the past but have decided he does not meet criteria.  He has not endorsing adequate criteria at this time for a bipolar related diagnosis.  He denies symptoms of psychosis or trauma.  He is referred to day treatment by his mom after feeling like he needs added support.  He reports his goal for treatment is to get better at managing day to day functioning and to be able to live successfully with his symptoms.  He reports his current stressors include lviing at home, not working very much, lack of income, recent break up, and some conflict with moms.  He has never been hospitalized for mental health and denies history of suicide attempts.  However, a few years ago he did get to the point of walking on bridges in the middle of night with the intent to jump.  He reports thoughts at present are mostly parasuicidal and denies plan or intent to act.  He committed to safety.  He will be placed in the 3C group which meets on Mondays,  Tuesdays, and Thursdays from 1-4 starting 5/28/19.      Prognosis is Good. Without the recommended intervention, the client is likely to experience the following consequences of their symptoms: Increase in symptoms, decrease in functioning, worsening safety concerns, need for higher level of care.    Referrals to services required by statute or rule:   Report to child/adult protection services was NA.   Referral to another professional/service is not indicated at this time..    Program Recommendation: Day Treatment (DT) .  3C MTR 1-4 starting 5/28/19    Assessment Completed by: Grace Camacho

## 2019-05-22 NOTE — PROGRESS NOTES
Acknowledgement of Current Treatment Plan       I have reviewed my treatment plan with my therapist / counselor on 5/22/2019. I agree with the plan as it is written in the electronic health record.    Name Signature   Dominic Workman    Name of Therapist / Counselor    Ebenezer Camacho MA [facility]

## 2019-05-22 NOTE — PROGRESS NOTES
"Adult Outpatient Mental Health Programs    MY COPING PLAN FOR SAFETY    NAME: Dominic Workman  MRN: [id number]  SAFETY PLAN:  Step 1: Warning signs / cues (Thoughts, images, mood, situation, behavior) that a crisis may be developing:    Thoughts: \"I don't matter\", \"People would be better off without me\", \"I'm a burden\", \"I can't do this anymore\", \"I just want this to end\", \"Nothing makes it better\" and \"What is the point\"    Images: NA    Thinking Processes: ruminations (can't stop thinking about my problems):  , intrusive thoughts (bothersome, unwanted thoughts that come out of nowhere):  , highly critical and negative thoughts:  , disorganized thinking:   and paranoia:      Mood: worsening depression, hopelessness, helplessness, intense worry and disinhibited (not caring about things or consequences)    Behaviors: isolating/withdrawing , using drugs, can't stop crying, impulsive, reckless behaviors (acting without thinking): spending, substance use, not taking care of myself, not taking care of my responsibilities and sleeping too much    Situations: relationship problems, financial stress and thinking about the future a lot, politics,    Step 2: Coping strategies - Things I can do to take my mind off of my problems without contacting another person (relaxation technique, physical activity):    Distress Tolerance Strategies:  relaxation activities:  , listen to positive and upbeat music:  , sensory based activities/self-soothe with five senses:  , watch a funny movie:  , read a book:  , change body temperature (ice pack/cold water) , paced breathing/progressive muscle relaxation and play with animals    Physical Activities: go for a walk, meditation and deep breathing, hiking and tennsi    Focus on helpful thoughts:  \"This is temporary\", \"I will get through this\", \"It always passes\" and self-compassion statements:    Step 3: People and social settings that provide distraction:   Name:   Sandra Phone:    Name: " Alexis Phone:    Name: Efren Phone:     movie theater, pet store/humane society, zoo, coffee shop, park, gym , support group (i.e. twelve-step) and hanging out in the rain, concerts   Step 4: Remind myself of people and things that are important to me and worth living for:  The future.  There is a lot of stuff I want to do and places I want to see.  When I am enjoying life I enjoy living.    Step 5: When I am in crisis, I can ask these people to help me use my safety plan:   Name: Uma Phone:    Name: Fabiola Phone:    Name: Friend  Phone:   Step 6: Making the environment safe:     remove drugs, alternate routes: avoid walking on bridges and be around others  Step 7: Professionals or agencies I can contact during a crisis:    Suicide Prevention Lifeline: 2-446-863-TALK (4035)    Crisis Text Line Service (available 24 hours a day, 7 days a week): Text [place] to 721034    Call  **CRISIS (083778) from a cell phone to talk to a team of professionals who can help you.  Crisis Services By County: Phone Number:   [place]     [phone]   [place]    [phone]   [place]    [phone]   [place]    [phone]   [place]    [phone]   [place] [phone]   [place]     [phone]       Call 911 or go to my nearest emergency department.     I helped develop this safety plan and agree to use it when needed.  I have been given a copy of this plan.      Client signature _________________________________________________________________  Today s date:  5/22/2019  Adapted from Safety Plan Template 2008 Leida Simon and Dex Holt is reprinted with the express permission of the authors.  No portion of the Safety Plan Template may be reproduced without the express, written permission.  You can contact the authors at [email or web address] or [email or web address].

## 2019-05-30 ENCOUNTER — HOSPITAL ENCOUNTER (OUTPATIENT)
Dept: BEHAVIORAL HEALTH | Facility: CLINIC | Age: 24
End: 2019-05-30
Attending: PSYCHIATRY & NEUROLOGY
Payer: COMMERCIAL

## 2019-05-30 PROBLEM — F32.9 MAJOR DEPRESSIVE DISORDER: Status: ACTIVE | Noted: 2019-05-30

## 2019-05-30 ASSESSMENT — ANXIETY QUESTIONNAIRES
6. BECOMING EASILY ANNOYED OR IRRITABLE: SEVERAL DAYS
1. FEELING NERVOUS, ANXIOUS, OR ON EDGE: MORE THAN HALF THE DAYS
GAD7 TOTAL SCORE: 9
3. WORRYING TOO MUCH ABOUT DIFFERENT THINGS: MORE THAN HALF THE DAYS
5. BEING SO RESTLESS THAT IT IS HARD TO SIT STILL: NOT AT ALL
2. NOT BEING ABLE TO STOP OR CONTROL WORRYING: MORE THAN HALF THE DAYS
7. FEELING AFRAID AS IF SOMETHING AWFUL MIGHT HAPPEN: SEVERAL DAYS

## 2019-05-30 ASSESSMENT — PATIENT HEALTH QUESTIONNAIRE - PHQ9: 5. POOR APPETITE OR OVEREATING: SEVERAL DAYS

## 2019-05-30 NOTE — PROGRESS NOTES
"Adult Mental Health Outpatient Group Therapy Progress Note     Client Initial Individualized Goals for Treatment: \"Get better at managing day to day stuff.  I want to be able to successfully live my life with symptoms\".    See Initial Treatment suggestions for the client during the time between Diagnostic Assessment and completion of the Master Individualized Treatment Plan.    Treatment Goals:     To be determined     Area of Treatment Focus:  Symptom Management, Personal Safety and Community Resources/Discharge Planning    Therapeutic Interventions/Treatment Strategies:  Support, Feedback, Safety Assessments, Education and Cognitive Behavioral Therapy    Response to Treatment Strategies:  Gave Feedback, Listened, Focused on Goals and Attentive     Name of Group: Group Psychotherapy I and Group Psychotherapy II Date/Time: 5/30/19 / 1300 to 1350 and 1400 to 1450    Number of Group Participants: 5     Description and Outcome:  Writer welcomed and oriented client to groups.  Writer reviewed confidentiality, limitations of confidentiality, and group guidelines.  Dominic shared that he has been dealing with depression and anxiety.  He stated that he became depressed in the Kal year of college at the [employer] and had to take a medical leave.  He stated that he has been working with an individaul therapist.  He stated that he is trying to be aware of his negative self-talk.  He stated that he is tired of sleeping 14 hours per day.  He stated that he is trying to deal with the realization that the depression is longer term that this episode.  He stated that he has two Moms, one of whom is more supportive.  Client denied suicidal ideation, intent and plan.     Client demonstrated understanding of session content by introducing himself to peers.  Client shared symptoms and stressors.    Is this a Weekly Review of the Progress on the Treatment Plan?  Yes.      Are Treatment Plan Goals being addressed?  Yes, continue treatment " goals      Are Treatment Plan Strategies to Address Goals Effective?  Yes, continue treatment strategies      Are there any current contracts in place?  No

## 2019-06-03 ENCOUNTER — HOSPITAL ENCOUNTER (OUTPATIENT)
Dept: BEHAVIORAL HEALTH | Facility: CLINIC | Age: 24
End: 2019-06-03
Attending: PSYCHIATRY & NEUROLOGY
Payer: COMMERCIAL

## 2019-06-03 PROCEDURE — H2012 BEHAV HLTH DAY TREAT, PER HR: HCPCS

## 2019-06-03 ASSESSMENT — PATIENT HEALTH QUESTIONNAIRE - PHQ9: SUM OF ALL RESPONSES TO PHQ QUESTIONS 1-9: 15

## 2019-06-03 NOTE — PROGRESS NOTES
"Adult Mental Health Outpatient Group Therapy Progress Note     Client Initial Individualized Goals for Treatment: \"Get better at managing day to day stuff.  I want to be able to successfully live my life with symptoms\".    See Initial Treatment suggestions for the client during the time between Diagnostic Assessment and completion of the Master Individualized Treatment Plan.    Treatment Goals:     To be determined     Area of Treatment Focus:  Symptom Management, Personal Safety and Community Resources/Discharge Planning    Therapeutic Interventions/Treatment Strategies:  Support, Feedback, Safety Assessments, Education and Cognitive Behavioral Therapy    Response to Treatment Strategies:  Gave Feedback, Listened, Focused on Goals and Attentive     Name of Group: Group Psychotherapy I and Group Psychotherapy II Date/Time: 6/3/19 / 1300 to 1350 and 1400 to 1450    Number of Group Participants: 6    Description and Outcome:  Dominic reported having a good weekend.  He stated that his Mom went out of town and he enjoyed having the space to himself.  He stated that he had some quick passive suicidal ideation with no plans or intent.  He stated that he was able to spend time with friend but had to work around seeing his ex-girlfriend as she lives in the same place as his best friend.  He shared his thoughts about the different ways his female friends support him than his male friends.       Group Psychotherapy II:  Client participated in an exercise on mindfulness. Writer discussed the definition and applications of mindfulness practices.  The group peers discussed ways that they practice mindfulness and how they would like to expand this practice.      Client demonstrated understanding of session content by sharing current symptoms and stressors.    Is this a Weekly Review of the Progress on the Treatment Plan?  No    "

## 2019-06-04 ENCOUNTER — HOSPITAL ENCOUNTER (OUTPATIENT)
Dept: BEHAVIORAL HEALTH | Facility: CLINIC | Age: 24
End: 2019-06-04
Attending: PSYCHIATRY & NEUROLOGY
Payer: COMMERCIAL

## 2019-06-04 PROCEDURE — H2012 BEHAV HLTH DAY TREAT, PER HR: HCPCS

## 2019-06-04 ASSESSMENT — ANXIETY QUESTIONNAIRES: GAD7 TOTAL SCORE: 9

## 2019-06-04 NOTE — PROGRESS NOTES
"Adult Mental Health Outpatient Group Therapy Progress Note     Client Initial Individualized Goals for Treatment: \"Get better at managing day to day stuff.  I want to be able to successfully live my life with symptoms\".        See Initial Treatment suggestions for the client during the time between Diagnostic Assessment and completion of the Master Individualized Treatment Plan.    Treatment Goals:   Follow Safety Plan  Abstain from Substance Use   Ask for more information, support and/or assistance as needed.  Follow up with providers/community supports as needed: PCP in 5 months, Individual therapist on leave right now,   Report increases or changes in symptoms to staff.  Report any personal safety concerns to staff.   Take medications as prescribed.  Report medication changes and/or side effects to staff.  Attend and participate in groups as scheduled or notify staff if unable to do so.  Report any use of substances to staff as this may impact your symptoms and/or personal safety.  Notify staff if you have any other issues that need to be addressed. This may include any current abuse / neglect / exploitation or other vulnerability.  Follow recommendations of your treatment team and discuss concerns if not in agreement.      Area of Treatment Focus:  Symptom Management    Therapeutic Interventions/Treatment Strategies:  Support, Feedback, Safety Assessments, Structured Activity and Education    Response to Treatment Strategies:  Accepted Feedback, Listened, Attentive, Accepted Support and Alert     Name of Group: Life  Skills(3:00-4:00)     Number of Group Participants: 6    Description and Outcome:  Client presented as calm and alert with good focus and concentration. Psychosocial skills addressed included a discussion related to healthy habits ,roles and routines. Prioritized functional problems include managing finances, effectively using abilities,working towards goals and being more involved in daily roles. " Initial treatment goals include time management,using personal time more effectively and decrease procrastination.  Client would benefit from additional opportunities to practice and implement content from this session  in every day life,relationships and mental health recovery.    Is this a Weekly Review of the Progress on the Treatment Plan?  Yes.      Are Treatment Plan Goals being addressed?  Yes, continue treatment goals      Are Treatment Plan Strategies to Address Goals Effective?  Yes, continue treatment strategies      Are there any current contracts in place?  No

## 2019-06-05 NOTE — PROGRESS NOTES
"Adult Mental Health Outpatient Group Therapy Progress Note     Client Initial Individualized Goals for Treatment: \"Get better at managing day to day stuff.  I want to be able to successfully live my life with symptoms\".    See Initial Treatment suggestions for the client during the time between Diagnostic Assessment and completion of the Master Individualized Treatment Plan.    Treatment Goals:     To be determined     Area of Treatment Focus:  Symptom Management, Personal Safety and Community Resources/Discharge Planning    Therapeutic Interventions/Treatment Strategies:  Support, Feedback, Safety Assessments, Education and Cognitive Behavioral Therapy    Response to Treatment Strategies:  Gave Feedback, Listened, Focused on Goals and Attentive     Name of Group: Group Psychotherapy I and Group Psychotherapy II Date/Time: 6/4/19 / 1300 to 1350 and 1400 to 1450    Number of Group Participants: 6    Description and Outcome:  Dominic reported meeting his goal of connecting with hiss friends who are supportive of mental health issues.  He stated that he is trying to decrease his isolativeness by scheduling time with friends to talk and to do activities together.  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  Client denied suicidal ideation, intent and plan. Anxiety level was variable.       Group Psychotherapy II:  Writer facilitated discussion on anxiety symptoms.  Writer discussed physical symptoms, behavioral patterns, and common cognitive patterns.  Writer reviewed both acute and ongoing anxiety symptoms.  Writer discussed ways to manage the physical responses, behavioral responses, and cognitive responses.  Client shared common anxiety symptoms they experience and the coping skills they are using.      Client demonstrated understanding of session content by sharing current symptoms and stressors.    Is this a Weekly Review of the " Progress on the Treatment Plan?  No

## 2019-06-05 NOTE — DISCHARGE SUMMARY
"       Adult Mental Health Intensive Outpatient Discharge Summary/Instructions      Patient: Dominic Workman MRN: [id number]   : 1995 Age: 24 year old Sex: male     Admission Date: 19    Discharge Date: 19    Diagnosis: 296.33 (F33.2) Major Depressive Disorder, Recurrent Episode, Severe _  300.02 (F41.1) Generalized Anxiety Disorder    Focus of Treatment / Progress    Personal Safety: Dominic denied difficulty with suicidal ideation at discharge.     * Follow your safety plan     * Call crisis lines as needed:    [facility] [phone] [facility] [phone]  [facility] [phone] Crisis Connection [phone]  [facility] [phone] [facility] COPE [phone]  [facility] [phone] National Suicide Prevention [phone]  [facility] [phone] Suicide Prevention [phone]  [facility] [phone]    Managing symptoms of:  Dominic worked on skills to manage depressive and anxiety symptoms.  He worked on decreasing avoidance and procrastination behaviors.    Community support/health:  [place] [address] ([phone]) [email or web address], [place] Crisis text line( Text [place] to 343661),  [facility]  [place], [place] ([phone])  Idania Cardoso [facility] [place], [place] ( [phone])     Managing Symptoms and Preventing Relapse    * Go to all of your appointments    * Take all medications as directed.      * Carry a current list if medication with you    * Do not use illicit (street) drugs.  Avoid alcohol    * Report these symptoms to your care team. These are early signs of relapse:   Thoughts of suicide   Losing more sleep   Increased confusion   Mood getting worse   Feeling more aggressive   Other:  Difficulty leaving the home, increased anxiety level    *Use these skills daily: Talk to someone you trust at least one time weekly, set boundaries and say \"no\", be assertive, act opposite of negative feelings, accept " challenges with a positive attitude, exercise at least three times per week for 30 minutes, get enough sleep, eat healthy foods, get into a good routine    Copy of summary sent to: In Epic    Follow up with psychiatrist / main caregiver: Dr. Julia Maurer, primary care provider    Next visit: In September or October    Follow up with your therapist: Dominic needs a new therapist as his former therapist, Cheikh Wood is on leave of absence.  A referral list has been provided of area clinics and providers.   Next visit: As scheduled by Dominic    Go to group therapy and / or support groups at: [facility] Connection support groups    See your medical doctor about:  For an annual physical exam or any general wellness or illness as needed.    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  Spend time with family and friends.      Your treatment team appreciates having the opportunity to work with you and wishes you the best.    Client Signature:_______________________  Date / Time:___________  Staff Signature:________________________   Date / Time:___________

## 2019-06-05 NOTE — PROGRESS NOTES
Acknowledgement of Current Treatment Plan       I have reviewed my treatment plan with my therapist / counselor on 8/5/19.   I agree with the plan as it is written in the electronic health record. (3C)    Name:      Signature:  Dominic Camacho MD  Psychiatrist    Adonis Hallman, NP    Noreen Mendez, [facility]  Psychotherapist    Kirstin Chaves, RN  Nurse Liaison    Joel Putnam, OTR/L  Occupational Therapist

## 2019-06-05 NOTE — PROGRESS NOTES
Adult Day Treatment Program:  Individualized Treatment Plan     Date of Plan: 6/10/19    Name: Dominic Workman MRN: [id number]    : 1995    Programs:  Day Treatment (DT)     Clinical Track (if applicable):  3C    DSM5 Diagnosis  296.33 (F33.2) Major Depressive Disorder, Recurrent Episode, Severe _  300.02 (F41.1) Generalized Anxiety Disorder    Adult Day Treatment Program Multidisciplinary Team Members: Hi Camacho MD and/or Adonis Hallman NP; Noreen Mendez, [facility], Kirstin Chaves RN, Joel Putnam OTR/L, Noreen Leon [facility], BC-DMT    Dominic Workman will participate in the Adult Day Treatment Program  3 days per week, 3 hours per day. Anticipated duration/discharge: 12 weeks    Program Start Date: 19  Anticipated Discharge Date: 19 (pending authorization/clinical changes)    NOTE: Complete CGI     Review Date: Does Dominic Workman continue to meet criteria to participate in the Adult Day Treatment Program, 3 days per week; 3 hours per day?   19 yes                       Client Strengths:  caring, educated, empathetic, employed, goal-focused, good listener, has a previous history of therapy, insightful, intelligent, open to learning, open to suggestions / feedback, support of family, friends and providers, supportive, wants to learn, willing to ask questions, willing to relate to others and work history especially caring and empathetic.     Client Participation in Plan:  Contributed to goals and plan     Areas of Vulnerability:  Suicidal Ideation  Poor impulse control  Anxiety with/without panic attacks  Active/history of addiction/substance abuse  Depressive symptoms  Eating Disorder symptoms  Physical/medical  Trauma/Abuse/Neglect.     Long-Term Goals:  Knowledge about illness and management of symptoms   Maintenance of personal safety     Abuse Prevention Plan:  Safe, therapeutic environment   Safety coping plan as needed   Education regarding illness and skill development    Coordination with care providers     Discharge Criteria:  Satisfactory progress toward treatment goals   Improvement re: identified problems and symptoms   Ability to continue recovery at next level of service   Has a discharge plan in place   Has safety/coping plan in place      Areas of Treatment Focus        Area of Treatment Focus:   Personal Safety  Start Date:    6/10/19    Goal:  Target Date: 8/5/19, 8/22/19 Status: Completed  Client will notify staff when needing assistance to develop or implement a coping plan to manage suicidal or self injurious urges.  Client will use coping plan for safety, as needed.      Progress:   8/5/19.  Dominic reported that his suicidal ideation as decreased in frequency to once every two weeks.  He reported that the thoughts continue to be passive suicidal ideation when they occur.  Continue goal.    8/22/19  Dominic consistently practiced skills to manage passive suicidal ideation.    Treatment Strategies:   Assess / reassess level of potential for harm to self or others  Engage in safety planning when indicated  Facilitate increased self awareness          Area of Treatment Focus:   Symptom Stabilization and Management  Start Date:    6/10/19    Goal:  Target Date: 8/5/19, 8/22/19 Status: Completed  When in life skills group Dominic report progress to manage his finances,effectively use his abilities and be more involved in his daily roles providing an update weekly.      Progress:   8/5/19. Dominic reported working with his Mom to split up household chores and to complete them without prompts.  He stated that he is taking care of preparing meals for himself.  He is working on financial plans with both of his Moms as he currently is not working. Continue.  8/22/19 Dominic reported feeling positively about his plans for household chores and finances.  Completed.        Treatment Strategies:   Facilitate increased self awareness  Provide education regarding time management, use personal  time more effectively and decrease procrastination.          Area of Treatment Focus:   Symptom Stabilization and Management  Start Date:    6/10/19    Goal:  Target Date: 8/5/19 Status: Completed  Dominic will learn and practice cognitive and behvioral skills to manage depressive and anxiety symptoms.  He will identify and challenge negative self-talk.      Progress:   8/5/19: Dominic stated that it has taken a long time for him to identify negative self talk regularly and to challenge the negative self-talk.  He reported that he feels more confident in his ability to interrupt the downward spiral of negative self-talk.  Continue goal.  8/22/19: Dominic reported improved ability to consistently challenge negative self-talk.  Completed.        Treatment Strategies:   Teach adaptive coping skills and communication skills          Area of Treatment Focus:   Wellness and Mental Health  Start Date:    6/10/19    Goal:  Target Date: 8/5/19 Status: Completed  Dominic will improve wellness related behaviors by getting enough sleep,exercise, balanced nutrition and take medications (if prescribed) to maintain good mental health.        Progress:   8/5/19: Dominic stated that he has worked a lot on sleep patterns.  He stated that he has decreased naps to one hour in length.  He stated that his diet is improved and is now consistent.  Continue goal.    8/22/19: Dominic reported improved sleep patterns, with some variability when having social events.  Completed.    Treatment Strategies:   Facilitate increased self awareness  Provide education regarding wellness habits and routines which help to improve mental health.          Area of Treatment Focus:   Community Resources / Support and Discharge Planning  Start Date:    6/10/19    Goal:  Target Date: 8/5/19 Status: Stopped  Dominic will establish an aftercare plan to include medical providers and social supports by discharge.      Progress:   8/5/19: Dominic needs to call [employer] for a  "list of therapists in his area.  He stated that his previous therapist suggested that he work with a male provider.  Continue goal.      8/22/19.  Dominic did not follow through with the goal for obtaining a therapist prior to discharge.  He stated that he still intends to follow through.  Goal stopped.    Treatment Strategies:   Facilitate increased self awareness  Provide education regarding discharge planning and mental health social supports.     Reginaldo Putnam, OTR/L      NOTE: Required signatures are completed manually and scanned into the electronic medical record. See \"Media\" tab in epic.      "

## 2019-06-06 ENCOUNTER — HOSPITAL ENCOUNTER (OUTPATIENT)
Dept: BEHAVIORAL HEALTH | Facility: CLINIC | Age: 24
End: 2019-06-06
Attending: PSYCHIATRY & NEUROLOGY
Payer: COMMERCIAL

## 2019-06-06 PROCEDURE — H2012 BEHAV HLTH DAY TREAT, PER HR: HCPCS

## 2019-06-06 NOTE — PROGRESS NOTES
"  Adult Mental Health Outpatient Group Therapy Progress Note     Client Initial Individualized Goals for Treatment: \"Get better at managing day to day stuff.  I want to be able to successfully live my life with symptoms\".     See Initial Treatment suggestions for the client during the time between Diagnostic Assessment and completion of the Master Individualized Treatment Plan.     Treatment Goals:      To be determined     Area of Treatment Focus:  Symptom Management    Therapeutic Interventions/Treatment Strategies:  Support, Feedback, Structured Activity, Clarification and Education    Response to Treatment Strategies:  Accepted Feedback, Gave Feedback, Listened, Focused on Goals, Attentive, Accepted Support and Alert    Name of Group:  Mental health management 300-350, 7 participants     Description and Outcome:  The group was given a structured journaling worksheet with the focus on feeling identification, expression and containment.  Information on the purpose and benefits of structured journaling was discussed.  Group members were offered the opportunity to share part, all, or none of their journaling and were encouraged to comment on process. Dominic chose to work with the feeling of anger.  He verbalized understanding of topic and reported structure being helpful in adding depth and understanding to feeling.    Is this a Weekly Review of the Progress on the Treatment Plan?  No        "

## 2019-06-06 NOTE — PROGRESS NOTES
"Adult Mental Health Outpatient Group Therapy Progress Note     Client Initial Individualized Goals for Treatment: \"Get better at managing day to day stuff.  I want to be able to successfully live my life with symptoms\".    See Initial Treatment suggestions for the client during the time between Diagnostic Assessment and completion of the Master Individualized Treatment Plan.    Treatment Goals:     To be determined     Area of Treatment Focus:  Symptom Management, Personal Safety and Community Resources/Discharge Planning    Therapeutic Interventions/Treatment Strategies:  Support, Feedback, Safety Assessments, Education and Cognitive Behavioral Therapy    Response to Treatment Strategies:  Gave Feedback, Listened, Focused on Goals and Attentive     Name of Group: Group Psychotherapy I and Group Psychotherapy II Date/Time: 6/6/19 / 1300 to 1350 and 1400 to 1450    Number of Group Participants: 7     Description and Outcome:  Dominic reported feeling \"super exhausted\" in the mornings.  He stated that he is getting 8 hours of sleep.  He stated that he is having intense dreams about anger.   He stated that he tends to bottle up his anger and he has difficulty expressing anger.  Peers shared their experiences with improving their anger management skills. Client denied suicidal ideation, intent and plan.     Group Psychotherapy II:  Writer facilitated discussion on PTSD symptoms.  Writer discussed psychical symptoms, behavioral patterns, and common cognitive patterns.  Writer facilitated a discussion with clients who experience these symptoms about which coping skills they practice.      Client demonstrated understanding of session content by introducing himself to peers.  Client shared symptoms and stressors.    Is this a Weekly Review of the Progress on the Treatment Plan?  Yes.      Are Treatment Plan Goals being addressed?  Yes, continue treatment goals      Are Treatment Plan Strategies to Address Goals " Effective?  Yes, continue treatment strategies      Are there any current contracts in place?  No

## 2019-06-13 ENCOUNTER — HOSPITAL ENCOUNTER (OUTPATIENT)
Dept: BEHAVIORAL HEALTH | Facility: CLINIC | Age: 24
End: 2019-06-13
Attending: PSYCHIATRY & NEUROLOGY
Payer: COMMERCIAL

## 2019-06-13 PROCEDURE — H2012 BEHAV HLTH DAY TREAT, PER HR: HCPCS

## 2019-06-13 NOTE — PROGRESS NOTES
"  Adult Mental Health Outpatient Group Therapy Progress Note     Client Initial Individualized Goals for Treatment: \"Get better at managing day to day stuff.  I want to be able to successfully live my life with symptoms\".     See Initial Treatment suggestions for the client during the time between Diagnostic Assessment and completion of the Master Individualized Treatment Plan.     Treatment Goals:  1. Client will notify staff when needing assistance to develop or implement a coping plan to manage suicidal or self injurious urges.  Client will use coping plan for safety, as needed.  2. Dominic will learn and practice cognitive and behvioral skills to manage depressive and anxiety symptoms.  He will identify and challenge negative self-talk.  3.When in life skills group Dominic report progress to manage his finances,effectively use his abilities and be more involved in his daily roles providing an update weekly.  4.Dominic will improve wellness related behaviors by getting enough sleep,exercise, balanced nutrition and take medications (if prescribed) to maintain good mental health  5..Dominic will establish an aftercare plan to include medical providers and social supports by discharge.       Area of Treatment Focus:  Symptom Management    Therapeutic Interventions/Treatment Strategies:  Support, Feedback, Structured Activity, Clarification and Education    Response to Treatment Strategies:  Accepted Feedback, Gave Feedback, Listened, Focused on Goals, Attentive, Accepted Support and Alert    Name of Group:  Mental health management 300-350, 4 participants     Description and Outcome:  Group members were guided through a body scan focusing on use of breath to notice sensation, feeling and thought.  Imagery was introduced encouraging clients to consider their own change/growth/transitions   Clients were prompted to draw (or write), using nature as a prompt where they feel they are in their growth process lnoting what is it " "that needs attention to continue to grow.   Client demonstrated understanding of session by engaging in experiential and sharing process.     Dominic described self as a \"young sapling\"  He is hopeful about his growth process.    Is this a Weekly Review of the Progress on the Treatment Plan?  No        "

## 2019-06-13 NOTE — PROGRESS NOTES
"Adult Mental Health Outpatient Group Therapy Progress Note     Client Initial Individualized Goals for Treatment: \"Get better at managing day to day stuff.  I want to be able to successfully live my life with symptoms\".    See Initial Treatment suggestions for the client during the time between Diagnostic Assessment and completion of the Master Individualized Treatment Plan.    Treatment Goals:  1. Client will notify staff when needing assistance to develop or implement a coping plan to manage suicidal or self injurious urges.  Client will use coping plan for safety, as needed.  2. Dominic will learn and practice cognitive and behvioral skills to manage depressive and anxiety symptoms.  He will identify and challenge negative self-talk.  3.When in life skills group Dominic report progress to manage his finances,effectively use his abilities and be more involved in his daily roles providing an update weekly.  4.Dominic will improve wellness related behaviors by getting enough sleep,exercise, balanced nutrition and take medications (if prescribed) to maintain good mental health  5..Dominic will establish an aftercare plan to include medical providers and social supports by discharge.    Area of Treatment Focus:  Symptom Management, Personal Safety and Community Resources/Discharge Planning    Therapeutic Interventions/Treatment Strategies:  Support, Feedback, Safety Assessments, Education and Cognitive Behavioral Therapy    Response to Treatment Strategies:  Gave Feedback, Listened, Focused on Goals and Attentive     Name of Group: Group Psychotherapy I and Group Psychotherapy II Date/Time: 6/13/19 / 1300 to 1350 and 1400 to 1450    Number of Group Participants:4    Description and Outcome:  Dominic stated that he has been enjoying doing cat care and house sitting for family friends.  He stated that he had good mood for a couple of days then had poor sleep, depressed mood, increased feelings of loneliness, and increased " negative self-talk.  He stated that he smoked marijuana to cope, which made him feel better.  He stated that he does not want to rely on marijuana to manage his emotions.  Client denied suicidal ideation, intent and plan.     Group Psychotherapy II:  Writer facilitated discussion on using coping skills.  Writer reviewed the Improve [employer] Skills and the T.I.P. skills.  Client shared skills they are already using and skills they would like to try.     Client demonstrated understanding of session content by introducing himself to peers.  Client shared symptoms and stressors.    Is this a Weekly Review of the Progress on the Treatment Plan?  Yes.      Are Treatment Plan Goals being addressed?  Yes, continue treatment goals      Are Treatment Plan Strategies to Address Goals Effective?  Yes, continue treatment strategies      Are there any current contracts in place?  No

## 2019-06-17 ENCOUNTER — HOSPITAL ENCOUNTER (OUTPATIENT)
Dept: BEHAVIORAL HEALTH | Facility: CLINIC | Age: 24
End: 2019-06-17
Attending: PSYCHIATRY & NEUROLOGY
Payer: COMMERCIAL

## 2019-06-17 PROCEDURE — H2012 BEHAV HLTH DAY TREAT, PER HR: HCPCS

## 2019-06-17 NOTE — PROGRESS NOTES
"Adult Mental Health Outpatient Group Therapy Progress Note     Client Initial Individualized Goals for Treatment: \"Get better at managing day to day stuff.  I want to be able to successfully live my life with symptoms\".    See Initial Treatment suggestions for the client during the time between Diagnostic Assessment and completion of the Master Individualized Treatment Plan.    Treatment Goals:  1. Client will notify staff when needing assistance to develop or implement a coping plan to manage suicidal or self injurious urges.  Client will use coping plan for safety, as needed.  2. Dominic will learn and practice cognitive and behvioral skills to manage depressive and anxiety symptoms.  He will identify and challenge negative self-talk.  3.When in life skills group Dominic report progress to manage his finances,effectively use his abilities and be more involved in his daily roles providing an update weekly.  4.Dominic will improve wellness related behaviors by getting enough sleep,exercise, balanced nutrition and take medications (if prescribed) to maintain good mental health  5..Dominic will establish an aftercare plan to include medical providers and social supports by discharge.    Area of Treatment Focus:  Symptom Management, Personal Safety and Community Resources/Discharge Planning    Therapeutic Interventions/Treatment Strategies:  Support, Feedback, Safety Assessments, Education and Cognitive Behavioral Therapy    Response to Treatment Strategies:  Gave Feedback, Listened, Focused on Goals and Attentive     Name of Group: Group Psychotherapy I and Group Psychotherapy II Date/Time: 6/17/19 / 1300 to 1350 and 1400 to 1450    Number of Group Participants:6    Description and Outcome:   Hour 1: Dominic is finished with his cat sitting and was beginning to feel lonely. He was social over the weekend. He went to a party in which his ex GF was in attendance but was not asked to leave and they managed the situation ok. She " "gave him back a t-shirt which he felt was a way of telling him it is over for good. He managed him emotions around this without spiraling into a deeper depression. He went to a wedding on Sunday and stated, \" it was hopeful to see 2 people in love\". Rated his mood as improving however he did endorse ongoing anxiety and shared ongoing use of cannabis and fears he may be like his mother who struggled with alcohol. He is aware that he is using it to numb feelings but is in pre-contemplative stage and acknowledged fear of changing and getting stuck in the \" what if's\". He was supportive to his peers, good eye contact, friendly and verbalized group was benefiting him. He got feedback that he is showing resilience especially in how he handled seeing ex GF.     Hour 2: Topic- Behavioral Activation- change. Together we reviewed a handout.Dominic engaged and participated. He noted he tends to ruminate a lot and worry about things but is not always good about taking action steps. He felt the tips on the worksheet could help him make small steps and break things into manageable stages. Verbalized understanding concepts. Used his marijuana use as example of area he has yet to really change but is contemplative about it.    Client demonstrated understanding of session content by introducing himself to peers.  Client shared symptoms and stressors.    Is this a Weekly Review of the Progress on the Treatment Plan?  Yes.      Are Treatment Plan Goals being addressed?  Yes, continue treatment goals      Are Treatment Plan Strategies to Address Goals Effective?  Yes, continue treatment strategies      Are there any current contracts in place?  No  "

## 2019-06-18 ENCOUNTER — HOSPITAL ENCOUNTER (OUTPATIENT)
Dept: BEHAVIORAL HEALTH | Facility: CLINIC | Age: 24
End: 2019-06-18
Attending: PSYCHIATRY & NEUROLOGY
Payer: COMMERCIAL

## 2019-06-18 PROCEDURE — H2012 BEHAV HLTH DAY TREAT, PER HR: HCPCS

## 2019-06-18 NOTE — PROGRESS NOTES
Current Outpatient Medications   Medication     aspirin-acetaminophen-caffeine (EXCEDRIN MIGRAINE) 250-250-65 MG per tablet     cholecalciferol (VITAMIN D3) 1000 UNIT tablet     venlafaxine (EFFEXOR-ER) 225 MG 24 hr tablet     No current facility-administered medications for this encounter.      Past Medical History:   Diagnosis Date     Depressive disorder      Psychiatry staffing: case discussed  Diagnosis:  MDD, depression sx ongoing and MJ smoking.  Here to stabilize mood.

## 2019-06-19 NOTE — PROGRESS NOTES
"Adult Mental Health Outpatient Group Therapy Progress Note     Client Initial Individualized Goals for Treatment: \"Get better at managing day to day stuff.  I want to be able to successfully live my life with symptoms\".        See Initial Treatment suggestions for the client during the time between Diagnostic Assessment and completion of the Master Individualized Treatment Plan.    Treatment Goals:   Follow Safety Plan  Abstain from Substance Use   Ask for more information, support and/or assistance as needed.  Follow up with providers/community supports as needed: PCP in 5 months, Individual therapist on leave right now,   Report increases or changes in symptoms to staff.  Report any personal safety concerns to staff.   Take medications as prescribed.  Report medication changes and/or side effects to staff.  Attend and participate in groups as scheduled or notify staff if unable to do so.  Report any use of substances to staff as this may impact your symptoms and/or personal safety.  Notify staff if you have any other issues that need to be addressed. This may include any current abuse / neglect / exploitation or other vulnerability.  Follow recommendations of your treatment team and discuss concerns if not in agreement.      Area of Treatment Focus:  Symptom Management    Therapeutic Interventions/Treatment Strategies:  Support, Feedback, Safety Assessments, Structured Activity and Education    Response to Treatment Strategies:  Accepted Feedback, Listened, Attentive, Accepted Support and Alert     Name of Group: Life  Skills(3:00-4:00)     Number of Group Participants: 6    Description and Outcome:  Client presented as calm and alert with good focus and concentration. Psychosocial skills addressed included a discussion related to coping through the senses, Goal #1 (no personal safety concerns reported or observed).  Client would benefit from additional opportunities to practice and implement content from this " session  in every day life,relationships and mental health recovery.    Is this a Weekly Review of the Progress on the Treatment Plan?  Yes.      Are Treatment Plan Goals being addressed?  Yes, continue treatment goals      Are Treatment Plan Strategies to Address Goals Effective?  Yes, continue treatment strategies      Are there any current contracts in place?  No

## 2019-06-20 ENCOUNTER — HOSPITAL ENCOUNTER (OUTPATIENT)
Dept: BEHAVIORAL HEALTH | Facility: CLINIC | Age: 24
End: 2019-06-20
Attending: PSYCHIATRY & NEUROLOGY
Payer: COMMERCIAL

## 2019-06-20 PROCEDURE — H2012 BEHAV HLTH DAY TREAT, PER HR: HCPCS

## 2019-06-20 NOTE — PROGRESS NOTES
"  Adult Mental Health Outpatient Group Therapy Progress Note     Client Initial Individualized Goals for Treatment: \"Get better at managing day to day stuff.  I want to be able to successfully live my life with symptoms\".     See Initial Treatment suggestions for the client during the time between Diagnostic Assessment and completion of the Master Individualized Treatment Plan.     Treatment Goals:  1. Client will notify staff when needing assistance to develop or implement a coping plan to manage suicidal or self injurious urges.  Client will use coping plan for safety, as needed.  2. Dominic will learn and practice cognitive and behvioral skills to manage depressive and anxiety symptoms.  He will identify and challenge negative self-talk.  3.When in life skills group Dominic report progress to manage his finances,effectively use his abilities and be more involved in his daily roles providing an update weekly.  4.Dominic will improve wellness related behaviors by getting enough sleep,exercise, balanced nutrition and take medications (if prescribed) to maintain good mental health  5..Dominic will establish an aftercare plan to include medical providers and social supports by discharge.       Area of Treatment Focus:  Symptom Management    Therapeutic Interventions/Treatment Strategies:  Support, Feedback, Structured Activity, Clarification and Education    Response to Treatment Strategies:  Accepted Feedback, Gave Feedback, Listened, Focused on Goals, Attentive, Accepted Support and Alert    Name of Group:  Mental health management 300-350, 5 participants     Description and Outcome:  The group was given information and education regarding setting intentions.  Intentions were presented as not a goal to be achieved but as way to live one's life.  Intentions are an alignment between our heart and our mind.  The group was led through brief body scanning and imagery encouraging clients to focus what was most important to them " as they enter the week. They were asked to identify one word and to represent that word on paper.  Client demonstrated understanding of session as they shared their intention for the week. Dominic shared his intention around openess to intimacy, in a variety of relationships.  He shared self awareness is difficult.    Is this a Weekly Review of the Progress on the Treatment Plan?  No

## 2019-06-20 NOTE — PROGRESS NOTES
"Adult Mental Health Outpatient Group Therapy Progress Note     Client Initial Individualized Goals for Treatment: \"Get better at managing day to day stuff.  I want to be able to successfully live my life with symptoms\".    See Initial Treatment suggestions for the client during the time between Diagnostic Assessment and completion of the Master Individualized Treatment Plan.    Treatment Goals:  1. Client will notify staff when needing assistance to develop or implement a coping plan to manage suicidal or self injurious urges.  Client will use coping plan for safety, as needed.  2. Dominic will learn and practice cognitive and behvioral skills to manage depressive and anxiety symptoms.  He will identify and challenge negative self-talk.  3.When in life skills group Dominic report progress to manage his finances,effectively use his abilities and be more involved in his daily roles providing an update weekly.  4.Dominic will improve wellness related behaviors by getting enough sleep,exercise, balanced nutrition and take medications (if prescribed) to maintain good mental health  5..Dominic will establish an aftercare plan to include medical providers and social supports by discharge.    Area of Treatment Focus:  Symptom Management, Personal Safety and Community Resources/Discharge Planning    Therapeutic Interventions/Treatment Strategies:  Support, Feedback, Safety Assessments, Education and Cognitive Behavioral Therapy    Response to Treatment Strategies:  Gave Feedback, Listened, Focused on Goals and Attentive     Name of Group: Group Psychotherapy I and Group Psychotherapy II Date/Time: 6/18/19 / 1300 to 1350 and 1400 to 1450    Number of Group Participants: 6    Description and Outcome:  Dominic stated that he spent time with his Mom and picked out a new phone,.  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  He stated that he believes that his medications are starting to work for him.  " He is noticing increased ease of getting out of bed. He reported sleeping 8 hours despite late sleep onset.  Client denied suicidal ideation, intent and plan.     Group Psychotherapy II:  Client participated in a discussion about their common procrastination behaviors and frequently avoided tasks.  Writer reviewed factors that contribute to procrastination.  Writer reviewed strategies to decrease procrastination behaviors.  Clients discussed ways to increase motivation.      Client demonstrated understanding of session content by sharing symptoms and stressors.    Is this a Weekly Review of the Progress on the Treatment Plan?  No

## 2019-06-20 NOTE — PROGRESS NOTES
"Adult Mental Health Outpatient Group Therapy Progress Note     Client Initial Individualized Goals for Treatment: \"Get better at managing day to day stuff.  I want to be able to successfully live my life with symptoms\".    See Initial Treatment suggestions for the client during the time between Diagnostic Assessment and completion of the Master Individualized Treatment Plan.    Treatment Goals:  1. Client will notify staff when needing assistance to develop or implement a coping plan to manage suicidal or self injurious urges.  Client will use coping plan for safety, as needed.  2. Dominic will learn and practice cognitive and behvioral skills to manage depressive and anxiety symptoms.  He will identify and challenge negative self-talk.  3.When in life skills group Dominic report progress to manage his finances,effectively use his abilities and be more involved in his daily roles providing an update weekly.  4.Dominic will improve wellness related behaviors by getting enough sleep,exercise, balanced nutrition and take medications (if prescribed) to maintain good mental health  5..Dominic will establish an aftercare plan to include medical providers and social supports by discharge.    Area of Treatment Focus:  Symptom Management, Personal Safety and Community Resources/Discharge Planning    Therapeutic Interventions/Treatment Strategies:  Support, Feedback, Safety Assessments, Education and Cognitive Behavioral Therapy    Response to Treatment Strategies:  Gave Feedback, Listened, Focused on Goals and Attentive     Name of Group: Group Psychotherapy I and Group Psychotherapy II Date/Time: 6/20/19 / 1300 to 1350 and 1400 to 1450    Number of Group Participants: 5    Description and Outcome:  Dominic stated that he did not follow through on his goals to ask a woman out on a date or to apply for a [articular job.  He stated that he relaxed yesterday instead of working on the foals.  He stated that he would like to improve his " ability to follow through on goals.  Client denied suicidal ideation, intent and plan.     Group Psychotherapy II:  Writer facilitated a discussion on the skill of coping ahead.    Client shared a current or recent example of anticipated distress.  Client completed a worksheet about steps to using the cope ahead skill with peers.     Client demonstrated understanding of session content by sharing symptoms and stressors.    Is this a Weekly Review of the Progress on the Treatment Plan?  No

## 2019-06-27 ENCOUNTER — HOSPITAL ENCOUNTER (OUTPATIENT)
Dept: BEHAVIORAL HEALTH | Facility: CLINIC | Age: 24
End: 2019-06-27
Attending: PSYCHIATRY & NEUROLOGY
Payer: COMMERCIAL

## 2019-06-27 PROCEDURE — H2012 BEHAV HLTH DAY TREAT, PER HR: HCPCS

## 2019-06-27 NOTE — PROGRESS NOTES
"Adult Mental Health Outpatient Group Therapy Progress Note     Client Initial Individualized Goals for Treatment: \"Get better at managing day to day stuff.  I want to be able to successfully live my life with symptoms\".    See Initial Treatment suggestions for the client during the time between Diagnostic Assessment and completion of the Master Individualized Treatment Plan.    Treatment Goals:  1. Client will notify staff when needing assistance to develop or implement a coping plan to manage suicidal or self injurious urges.  Client will use coping plan for safety, as needed.  2. Dominic will learn and practice cognitive and behvioral skills to manage depressive and anxiety symptoms.  He will identify and challenge negative self-talk.  3.When in life skills group Dominic report progress to manage his finances,effectively use his abilities and be more involved in his daily roles providing an update weekly.  4.Dominic will improve wellness related behaviors by getting enough sleep,exercise, balanced nutrition and take medications (if prescribed) to maintain good mental health  5..Dominic will establish an aftercare plan to include medical providers and social supports by discharge.    Area of Treatment Focus:  Symptom Management, Personal Safety and Community Resources/Discharge Planning    Therapeutic Interventions/Treatment Strategies:  Support, Feedback, Safety Assessments, Education and Cognitive Behavioral Therapy    Response to Treatment Strategies:  Gave Feedback, Listened, Focused on Goals and Attentive     Name of Group: Group Psychotherapy I and Group Psychotherapy II Date/Time: 6/27/19 / 1300 to 1350 and 1400 to 1450    Number of Group Participants: 5    Description and Outcome:  Dominic stated that he had calm mood.  He stated that he plans to schedule to get together wirh friends.  He shared that he is out of marijuana and will be out for the next 10 days.  He shared his coping plan with the group.  He stated " that he ws using marijuana to manage sad mood and boredom.  Client denied suicidal ideation, intent and plan.     Group Psychotherapy II: Writer facilitated a discussion on managing life transitions.  Client s completed a worksheet and shared their answers with the group.      Client demonstrated understanding of session content by sharing symptoms and stressors.    Is this a Weekly Review of the Progress on the Treatment Plan?    Yes.      Are Treatment Plan Goals being addressed?  Yes, continue treatment goals      Are Treatment Plan Strategies to Address Goals Effective?  Yes, continue treatment strategies      Are there any current contracts in place?  No

## 2019-06-28 NOTE — PROGRESS NOTES
"  Adult Mental Health Outpatient Group Therapy Progress Note     Client Initial Individualized Goals for Treatment: \"Get better at managing day to day stuff.  I want to be able to successfully live my life with symptoms\".     See Initial Treatment suggestions for the client during the time between Diagnostic Assessment and completion of the Master Individualized Treatment Plan.     Treatment Goals:  1. Client will notify staff when needing assistance to develop or implement a coping plan to manage suicidal or self injurious urges.  Client will use coping plan for safety, as needed.  2. Dominic will learn and practice cognitive and behvioral skills to manage depressive and anxiety symptoms.  He will identify and challenge negative self-talk.  3.When in life skills group Dominic report progress to manage his finances,effectively use his abilities and be more involved in his daily roles providing an update weekly.  4.Dominic will improve wellness related behaviors by getting enough sleep,exercise, balanced nutrition and take medications (if prescribed) to maintain good mental health  5..Dominic will establish an aftercare plan to include medical providers and social supports by discharge.       Area of Treatment Focus:  Symptom Management    Therapeutic Interventions/Treatment Strategies:  Support, Feedback, Structured Activity, Clarification and Education    Response to Treatment Strategies:  Accepted Feedback, Gave Feedback, Listened, Focused on Goals, Attentive, Accepted Support and Alert    Name of Group:  Mental health management 200-250, 5 participants     Description and Outcome:  The group was provided with a guided breathing and warmup structure,  with focus on increasing self awareness and focus on the present moment.  Images such as expand/contract and reaching into space/coming back to center as clients were invited to expand movement (being reminded of individual limitations.)  Group members were encouraged to " share their observation.  Client demonstrated understanding of session by engaging in experiential and contributing to discussion. Dominic shared that he found material helpful.    Is this a Weekly Review of the Progress on the Treatment Plan?  No

## 2019-07-01 ENCOUNTER — HOSPITAL ENCOUNTER (OUTPATIENT)
Dept: BEHAVIORAL HEALTH | Facility: CLINIC | Age: 24
End: 2019-07-01
Attending: PSYCHIATRY & NEUROLOGY
Payer: COMMERCIAL

## 2019-07-01 PROCEDURE — H2012 BEHAV HLTH DAY TREAT, PER HR: HCPCS

## 2019-07-01 NOTE — PROGRESS NOTES
"  Adult Mental Health Outpatient Group Therapy Progress Note     Client Initial Individualized Goals for Treatment: \"Get better at managing day to day stuff.  I want to be able to successfully live my life with symptoms\".     See Initial Treatment suggestions for the client during the time between Diagnostic Assessment and completion of the Master Individualized Treatment Plan.     Treatment Goals:  1. Client will notify staff when needing assistance to develop or implement a coping plan to manage suicidal or self injurious urges.  Client will use coping plan for safety, as needed.  2. Dominic will learn and practice cognitive and behvioral skills to manage depressive and anxiety symptoms.  He will identify and challenge negative self-talk.  3.When in life skills group Dominic report progress to manage his finances,effectively use his abilities and be more involved in his daily roles providing an update weekly.  4.Dominic will improve wellness related behaviors by getting enough sleep,exercise, balanced nutrition and take medications (if prescribed) to maintain good mental health  5..Dominic will establish an aftercare plan to include medical providers and social supports by discharge.       Area of Treatment Focus:  Symptom Management    Therapeutic Interventions/Treatment Strategies:  Support, Feedback, Structured Activity, Clarification and Education    Response to Treatment Strategies:  Accepted Feedback, Gave Feedback, Listened, Focused on Goals, Attentive, Accepted Support and Alert    Name of Group: Group Psychotherapy  100-150, 5 participants     Description and Outcome:  Dominic participated in the group fully today. He talked about how his anxiety gets in his way of making progress on goals, but he has been trying to do what makes him anxious and has found it is less stressful than he anticipated. For example, he asked two women out this weekend and was rejected, but still felt proud of himself for asking and the " rejection was able to be tolerated. He did state his weekend was full of ups and downs. He had an increase in suicidal ideation on Friday and thought it'd be easy to do but then didn't want his mother to find him. Saturday was better and he reached out to his friends to help distract. He also goes for drives and listens to music for calming. No safety concerns noted for today.     Is this a Weekly Review of the Progress on the Treatment Plan?  No

## 2019-07-02 ENCOUNTER — HOSPITAL ENCOUNTER (OUTPATIENT)
Dept: BEHAVIORAL HEALTH | Facility: CLINIC | Age: 24
End: 2019-07-02
Attending: PSYCHIATRY & NEUROLOGY
Payer: COMMERCIAL

## 2019-07-02 PROCEDURE — H2012 BEHAV HLTH DAY TREAT, PER HR: HCPCS

## 2019-07-02 NOTE — PROGRESS NOTES
Adult Mental Health Day Treatment  TRACK: 3C    PATIENT'S NAME: Dominic Workman  MRN:   [id number]  :   1995  ACCT. NUMBER: [id number]  DATE OF SERVICE: 19  START TIME: 1500  END TIME: 1600  NUMBER OF PARTICIPANTS: 5      Summary of Group / Topics Discussed:  Life Skills: Self Awareness/Emotions/Feelings expression.    Patient Session Goals / Objectives:  When in life skills group Dominic report progress to manage his finances,effectively use his abilities and be more involved in his daily roles providing an update weekly.  Improve time management,use personal time more effectively and decrease procrastination.    Patient Participation / Response:  Fully participated with the group by sharing personal reflections / insights and openly received / provided feedback with other participants.    Patient presentation: Calm,alert ,focused and stable. Mood stable., Verbalized understanding of content and Patient would benefit from additional opportunities to practice the content to be able to generalize it to their everyday life with increased intentionality, consistency, and efficacy in support of their psychiatric recovery    Treatment Plan:  Patient has a current master individualized treatment plan.  See Epic treatment plan for more information.          Reginaldo Putnam, OTR/L

## 2019-07-02 NOTE — PROGRESS NOTES
Adult Mental Health Day Treatment  TRACK: 3C    PATIENT'S NAME: Dominic Workman  MRN:   [id number]  :   1995  ACCT. NUMBER: [id number]  DATE OF SERVICE: 19  START TIME: 2:00 pm  END TIME: 2:50 pm  NUMBER OF PARTICIPANTS: 4      Summary of Group / Topics Discussed:  Coping Skills: Improve the Moment: Patients learned to tolerate distress, by applying strategies to effect positive change in the present moment.  Patients will identified situations where they would benefit from applying strategies to improve the moment and reduce distress. Patients discussed how to distinguish when this can be useful in their lives or when other strategies would be more relevant or helpful.    Patient Session Goals / Objectives:    Discuss how the use of intentional  in the moment  actions can help reduce distress.    Review patients current practices and discuss a more formal way of practicing and accessing skills.    Increase ability to decide when to use improve the moment strategies    Choose 1-2 in the moment actions to apply during times of distress.    Patient Participation / Response:  Fully participated with the group by sharing personal reflections / insights and openly received / provided feedback with other participants.    Demonstrated understanding of topics discussed through group discussion and participation, Expressed understanding of the relevance / importance of coping skills at distressing times in life, Demonstrated knowledge of when to consider using a variety of coping skills in daily life, Identified barriers to applying coping skills, Identified / Expressed personal readiness to practice new coping skills and Committed to using the following techniques in times of distress: improve the moment    Treatment Plan:  Patient has a current master individualized treatment plan.  See Epic treatment plan for more information.        Grace Camacho

## 2019-07-02 NOTE — PROGRESS NOTES
"  Adult Mental Health Outpatient Group Therapy Progress Note     Client Initial Individualized Goals for Treatment: \"Get better at managing day to day stuff.  I want to be able to successfully live my life with symptoms\".     See Initial Treatment suggestions for the client during the time between Diagnostic Assessment and completion of the Master Individualized Treatment Plan.     Treatment Goals:  1. Client will notify staff when needing assistance to develop or implement a coping plan to manage suicidal or self injurious urges.  Client will use coping plan for safety, as needed.  2. Dominic will learn and practice cognitive and behvioral skills to manage depressive and anxiety symptoms.  He will identify and challenge negative self-talk.  3.When in life skills group Dominic report progress to manage his finances,effectively use his abilities and be more involved in his daily roles providing an update weekly.  4.Dominic will improve wellness related behaviors by getting enough sleep,exercise, balanced nutrition and take medications (if prescribed) to maintain good mental health  5..Dominic will establish an aftercare plan to include medical providers and social supports by discharge.  Area of Treatment Focus:  Symptom Management, Personal Safety, Develop / Improve Independent Living Skills and Develop Socialization / Interpersonal Relationship Skills    Therapeutic Interventions/Treatment Strategies:  Support, Feedback, Safety Assessments, Problem Solving and Education    Response to Treatment Strategies:  Accepted Feedback, Gave Feedback, Listened, Focused on Goals, Attentive and Accepted Support     Name of Group: Psychotherapy   Date/Time: 7/2/19 / 1:00 to 1:50  Number of Group Participants: 4     Description and Outcome:  Dominic reported having a difficult night due to high symptoms.  Aided in identifying potential causes of elevated symptoms including being alone and not carrie.  He denied getting to the place of " suicidal thoughts.  Highlighted skills used that helped him avoid suicidal thoughts including listening to music in his car.  He agreed and reported smoking marijuana also helped.  Encouraged him to use coping skills rather than substances to avoid relying on substance to cope.  He reported increased ruminations related to his ex but was able to challenge thoughts by thinking about being able to find someone else in the future and how he is happier and healthier now.  Reinforced skill use and connected to improved mood.  He noted possibility of seeing ex while at a party with mutual friends this weekend. Validated and normalized.  Provided skill suggestions to aid in coping before, during, and after party to mitigate stress.  He reported plans to drink and smoke but noted always feeling sad after using.  Encouraged him to avoid use citing elevated symptoms.  If unable to avoid completing encouraged moderation and a plan in place to manage increase in symptoms.  He appeared receptive to feedback and participated effectively in peers' time.  He denied safety concerns.    Client verbalized understanding of session content by to avoid substance use if ex is present at upcoming party and to apply coping skills discussed to manage symptoms effectively..    Is this a Weekly Review of the Progress on the Treatment Plan?  Yes.      Are Treatment Plan Goals being addressed?  Yes, continue treatment goals      Are Treatment Plan Strategies to Address Goals Effective?  Yes, continue treatment strategies      Are there any current contracts in place?  No

## 2019-07-08 ENCOUNTER — HOSPITAL ENCOUNTER (OUTPATIENT)
Dept: BEHAVIORAL HEALTH | Facility: CLINIC | Age: 24
End: 2019-07-08
Attending: PSYCHIATRY & NEUROLOGY
Payer: COMMERCIAL

## 2019-07-08 PROCEDURE — H2012 BEHAV HLTH DAY TREAT, PER HR: HCPCS

## 2019-07-09 ENCOUNTER — HOSPITAL ENCOUNTER (OUTPATIENT)
Dept: BEHAVIORAL HEALTH | Facility: CLINIC | Age: 24
End: 2019-07-09
Attending: PSYCHIATRY & NEUROLOGY
Payer: COMMERCIAL

## 2019-07-09 PROCEDURE — H2012 BEHAV HLTH DAY TREAT, PER HR: HCPCS

## 2019-07-09 NOTE — PROGRESS NOTES
"Adult Mental Health Outpatient Group Therapy Progress Note     Client Initial Individualized Goals for Treatment: \"Get better at managing day to day stuff.  I want to be able to successfully live my life with symptoms\".    See Initial Treatment suggestions for the client during the time between Diagnostic Assessment and completion of the Master Individualized Treatment Plan.    Treatment Goals:  1. Client will notify staff when needing assistance to develop or implement a coping plan to manage suicidal or self injurious urges.  Client will use coping plan for safety, as needed.  2. Dominic will learn and practice cognitive and behvioral skills to manage depressive and anxiety symptoms.  He will identify and challenge negative self-talk.  3.When in life skills group Dominic report progress to manage his finances,effectively use his abilities and be more involved in his daily roles providing an update weekly.  4.Dominic will improve wellness related behaviors by getting enough sleep,exercise, balanced nutrition and take medications (if prescribed) to maintain good mental health  5..Dominic will establish an aftercare plan to include medical providers and social supports by discharge.    Area of Treatment Focus:  Symptom Management, Personal Safety and Community Resources/Discharge Planning    Therapeutic Interventions/Treatment Strategies:  Support, Feedback, Safety Assessments, Education and Cognitive Behavioral Therapy    Response to Treatment Strategies:  Gave Feedback, Listened, Focused on Goals and Attentive     Name of Group: Group Psychotherapy I and Group Psychotherapy II Date/Time: 7/8/19 / 1300 to 1350 and 1400 to 1450    Number of Group Participants: 6    Description and Outcome:  Dominic stated that he had marijuana again and was using it to manage anxious mood.  He stated that he was more active, completing tasks and social events.  He stated that he did not have trouble with ruminative thoughts over the weekend.  " Client denied suicidal ideation, intent and plan.     Group Psychotherapy II: Writer facilitated a discussion on grounding skills.  Writer reviewed the purpose of using grounding techniques to reduce stress and to increase sense of mental well-being.  Client shared their understanding of when to use grounding techniques.  Client also problem solved barriers to use of grounding techniques.    Client demonstrated understanding of session content by sharing symptoms and stressors.    Is this a Weekly Review of the Progress on the Treatment Plan?  No

## 2019-07-09 NOTE — PROGRESS NOTES
Adult Mental Health Day Treatment  TRACK: 3C    PATIENT'S NAME: Dominic Workman  MRN:   [id number]  :   1995  ACCT. NUMBER: [id number]  DATE OF SERVICE: 19  START TIME: 1500  END TIME: 1600  NUMBER OF PARTICIPANTS: 5      Summary of Group / Topics Discussed:  Life Skills: Compensatory Strategies with a focus on memory and executive skills    Patient Session Goals / Objectives:  When in life skills group Dominic report progress to manage his finances,effectively use his abilities and be more involved in his daily roles providing an update weekly.  Improve time management,use personal time more effectively and decrease procrastination.    Patient Participation / Response:  Fully participated with the group by sharing personal reflections / insights and openly received / provided feedback with other participants.  Patient presentation: Calm,alert ,focused and stable. Mood stable., Verbalized understanding of content and Patient would benefit from additional opportunities to practice the content to be able to generalize it to their everyday life with increased intentionality, consistency, and efficacy in support of their psychiatric recovery    Treatment Plan:  Patient has a current master individualized treatment plan.  See Epic treatment plan for more information.          Reginaldo Putnam, OTR/L

## 2019-07-10 NOTE — PROGRESS NOTES
"Adult Mental Health Outpatient Group Therapy Progress Note     Client Initial Individualized Goals for Treatment: \"Get better at managing day to day stuff.  I want to be able to successfully live my life with symptoms\".    See Initial Treatment suggestions for the client during the time between Diagnostic Assessment and completion of the Master Individualized Treatment Plan.    Treatment Goals:  1. Client will notify staff when needing assistance to develop or implement a coping plan to manage suicidal or self injurious urges.  Client will use coping plan for safety, as needed.  2. Dominic will learn and practice cognitive and behvioral skills to manage depressive and anxiety symptoms.  He will identify and challenge negative self-talk.  3.When in life skills group Dominic report progress to manage his finances,effectively use his abilities and be more involved in his daily roles providing an update weekly.  4.Dominic will improve wellness related behaviors by getting enough sleep,exercise, balanced nutrition and take medications (if prescribed) to maintain good mental health  5..Dominic will establish an aftercare plan to include medical providers and social supports by discharge.    Area of Treatment Focus:  Symptom Management, Personal Safety and Community Resources/Discharge Planning    Therapeutic Interventions/Treatment Strategies:  Support, Feedback, Safety Assessments, Education and Cognitive Behavioral Therapy    Response to Treatment Strategies:  Gave Feedback, Listened, Focused on Goals and Attentive     Name of Group: Group Psychotherapy I and Group Psychotherapy II Date/Time: 7/9/19 / 1300 to 1350 and 1400 to 1450    Number of Group Participants: 6    Description and Outcome:  Dominic stated that he was able to enjoy spending time by himself yesterday relaxing.  He stated that he spent some time with his mMom.  He stated that his sleep is more consistent, about  8-9 hours per night.  He stated that he is smoking " "marijuana as he has marijuana to smoke again.  He stated that he was able to complete his personal hygiene.  He stated that he was \"waiting for a bad day\".  Peers gave feedback on how to manage the fear of increased symptoms.Client denied suicidal ideation, intent and plan.     Group Psychotherapy II: Writer facilitated a discussion on the skills of building positive experiences.  Writer reviewed the utility and benefits of building positivity for use in daily life. Clients gained understanding of how to identify and plan positive events.     Client demonstrated understanding of session content by sharing symptoms and stressors.    Is this a Weekly Review of the Progress on the Treatment Plan?  No  "

## 2019-07-11 ENCOUNTER — HOSPITAL ENCOUNTER (OUTPATIENT)
Dept: BEHAVIORAL HEALTH | Facility: CLINIC | Age: 24
End: 2019-07-11
Attending: PSYCHIATRY & NEUROLOGY
Payer: COMMERCIAL

## 2019-07-11 PROCEDURE — 90853 GROUP PSYCHOTHERAPY: CPT

## 2019-07-11 PROCEDURE — G0177 OPPS/PHP; TRAIN & EDUC SERV: HCPCS

## 2019-07-11 NOTE — PROGRESS NOTES
"  Adult Mental Health Outpatient Group Therapy Progress Note     Client Initial Individualized Goals for Treatment: \"Get better at managing day to day stuff.  I want to be able to successfully live my life with symptoms\".     See Initial Treatment suggestions for the client during the time between Diagnostic Assessment and completion of the Master Individualized Treatment Plan.     Treatment Goals:  1. Client will notify staff when needing assistance to develop or implement a coping plan to manage suicidal or self injurious urges.  Client will use coping plan for safety, as needed.  2. Dominic will learn and practice cognitive and behvioral skills to manage depressive and anxiety symptoms.  He will identify and challenge negative self-talk.  3.When in life skills group Dominic report progress to manage his finances,effectively use his abilities and be more involved in his daily roles providing an update weekly.  4.Dominic will improve wellness related behaviors by getting enough sleep,exercise, balanced nutrition and take medications (if prescribed) to maintain good mental health  5..Dominic will establish an aftercare plan to include medical providers and social supports by discharge.       Area of Treatment Focus:  Symptom Management    Therapeutic Interventions/Treatment Strategies:  Support, Feedback, Structured Activity, Clarification and Education    Response to Treatment Strategies:  Accepted Feedback, Gave Feedback, Listened, Focused on Goals, Attentive, Accepted Support and Alert    Name of Group:  Mental health management 300-350, 4 participants     Description and Outcome:  The group was provided with information regarding the importance/value/meaning of tending to the body.  The body informs our selves as to our needs and experiences and plays a crucial role in mindfulness.  The group discussed what it would mean to live a \"bodyful\" life and discussed barriers. Before moving the group inquiries were made " regarding limitations and the group was told to listen to physical limitations.   The group was provided with breathing and movement structure in which to explore their experience of their body and body in space.  The group focused on the use of strength to explore grounding.      Is this a Weekly Review of the Progress on the Treatment Plan?  No

## 2019-07-11 NOTE — PROGRESS NOTES
"Adult Mental Health Outpatient Group Therapy Progress Note     Client Initial Individualized Goals for Treatment: \"Get better at managing day to day stuff.  I want to be able to successfully live my life with symptoms\".    See Initial Treatment suggestions for the client during the time between Diagnostic Assessment and completion of the Master Individualized Treatment Plan.    Treatment Goals:  1. Client will notify staff when needing assistance to develop or implement a coping plan to manage suicidal or self injurious urges.  Client will use coping plan for safety, as needed.  2. Dominic will learn and practice cognitive and behvioral skills to manage depressive and anxiety symptoms.  He will identify and challenge negative self-talk.  3.When in life skills group Dominic report progress to manage his finances,effectively use his abilities and be more involved in his daily roles providing an update weekly.  4.Dominic will improve wellness related behaviors by getting enough sleep,exercise, balanced nutrition and take medications (if prescribed) to maintain good mental health  5..Dominic will establish an aftercare plan to include medical providers and social supports by discharge.    Area of Treatment Focus:  Symptom Management, Personal Safety and Community Resources/Discharge Planning    Therapeutic Interventions/Treatment Strategies:  Support, Feedback, Safety Assessments, Education and Cognitive Behavioral Therapy    Response to Treatment Strategies:  Gave Feedback, Listened, Focused on Goals and Attentive     Name of Group: Group Psychotherapy I  Date/Time: 7/11/19 / 1300 to 1350     Number of Group Participants: 4    Description and Outcome:  Dominic shared with the team that he is feeling lonely and thinking about his ex-girlfriend. He states they broke up over  Months ago and admitted that his depression was a central point of contention in their relationship. He is feeling better now and so has thoughts about how to " get back together with her. She is not interested in a relationship of any kind. Dominic was given a lot of positive feedback from other group members around this relationship and re-framing the situation. He does have friend support. No suicidal or other safety concerns noted today.     Client demonstrated understanding of session content by sharing symptoms and stressors.    Is this a Weekly Review of the Progress on the Treatment Plan?  No

## 2019-07-12 NOTE — PROGRESS NOTES
Adult Mental Health Day Treatment  TRACK: 36C    PATIENT'S NAME: Dominic Workman  MRN:   [id number]  :   1995  ACCT. NUMBER: [id number]  DATE OF SERVICE: 19  START TIME: 2:00  END TIME: 2:50  NUMBER OF PARTICIPANTS: 4      Summary of Group / Topics Discussed:  Self-Awareness: Gratitude: Topic focused on assisting patients in identifying key concepts in gratitude. Patients discussed the benefits of practicing gratitude and its impact on mood improvement, mindfulness, and perspective. Patients worked to increase time spent on recognition and appreciation of what is positive and working in their lives. Patients discussed the concepts and benefits of feeling grateful. The goal is to reduce rumination and negative thinking resulting in increased mindfulness and resilience. Patients specifically discussed how they can practice and problem solve barriers to daily gratitude practice.     Patient Session Goals / Objectives:    [place] the concept and benefits of gratitude    Identified ways to practice gratitude in daily life    Problem solved barriers to practicing gratitude    Patient Participation / Response:  Fully participated with the group by sharing personal reflections / insights and openly received / provided feedback with other participants.    Demonstrated understanding of topics discussed through group discussion and participation, Demonstrated understanding of values, strengths, and challenges to learn about themselves, Identified / Expressed readiness to act intentionally, increase self-compassion, promote personal growth and Practiced skills in session    Treatment Plan:  Patient has a current master individualized treatment plan.  See Epic treatment plan for more information.        KIMBERLY Silveira

## 2019-07-15 ENCOUNTER — HOSPITAL ENCOUNTER (OUTPATIENT)
Dept: BEHAVIORAL HEALTH | Facility: CLINIC | Age: 24
End: 2019-07-15
Attending: PSYCHIATRY & NEUROLOGY
Payer: COMMERCIAL

## 2019-07-15 PROCEDURE — 90853 GROUP PSYCHOTHERAPY: CPT

## 2019-07-15 PROCEDURE — G0177 OPPS/PHP; TRAIN & EDUC SERV: HCPCS

## 2019-07-16 ENCOUNTER — HOSPITAL ENCOUNTER (OUTPATIENT)
Dept: BEHAVIORAL HEALTH | Facility: CLINIC | Age: 24
End: 2019-07-16
Attending: PSYCHIATRY & NEUROLOGY
Payer: COMMERCIAL

## 2019-07-16 PROCEDURE — 90853 GROUP PSYCHOTHERAPY: CPT

## 2019-07-16 PROCEDURE — G0177 OPPS/PHP; TRAIN & EDUC SERV: HCPCS

## 2019-07-16 NOTE — PROGRESS NOTES
"Adult Mental Health Outpatient Group Therapy Progress Note     Client Initial Individualized Goals for Treatment: \"Get better at managing day to day stuff.  I want to be able to successfully live my life with symptoms\".    See Initial Treatment suggestions for the client during the time between Diagnostic Assessment and completion of the Master Individualized Treatment Plan.    Treatment Goals:  1. Client will notify staff when needing assistance to develop or implement a coping plan to manage suicidal or self injurious urges.  Client will use coping plan for safety, as needed.  2. Dominic will learn and practice cognitive and behvioral skills to manage depressive and anxiety symptoms.  He will identify and challenge negative self-talk.  3.When in life skills group Dominic report progress to manage his finances,effectively use his abilities and be more involved in his daily roles providing an update weekly.  4.Dominic will improve wellness related behaviors by getting enough sleep,exercise, balanced nutrition and take medications (if prescribed) to maintain good mental health  5..Dominic will establish an aftercare plan to include medical providers and social supports by discharge.    Area of Treatment Focus:  Symptom Management, Personal Safety and Community Resources/Discharge Planning    Therapeutic Interventions/Treatment Strategies:  Support, Feedback, Safety Assessments, Education and Cognitive Behavioral Therapy    Response to Treatment Strategies:  Gave Feedback, Listened, Focused on Goals and Attentive     Name of Group: Group Psychotherapy I and Group Psychotherapy II Date/Time: 7/15/19 / 1300 to 1350 and 1400 to 1450    Number of Group Participants: 5    Description and Outcome:  Dominic stated that he was able to enjoy social events including going swimming and attending a musical performance over the weekend.  He stated that he saw friends and attended a community event.  He stated that he saw his ex-girlfriend " but did not have distress over the interaction.  He stated that he was noticing that he had less catastrophic thinking and improved energy to do activities.Client denied suicidal ideation, intent and plan.     Group Psychotherapy II: Writer facilitated a discussion on grief.  Clients identified areas of grief including deaths, loss of relationships, loss of dreams, and death of pets.  Client identified stages of grief that they are experiencing, what keeps them stuck in those stages, and ways to move towards acceptance and integration of the loss.    Client demonstrated understanding of session content by sharing symptoms and stressors.    Is this a Weekly Review of the Progress on the Treatment Plan?  No

## 2019-07-16 NOTE — PROGRESS NOTES
Adult Mental Health Day Treatment  TRACK: 3C    PATIENT'S NAME: Dominic Workman  MRN:   [id number]  :   1995  ACCT. NUMBER: [id number]  DATE OF SERVICE: 7/15/19  START TIME: 1500  END TIME: 1600  NUMBER OF PARTICIPANTS: 6      Summary of Group / Topics Discussed:  Communication and Social Skills Development: Social Supports: Social Support Scale: Patients completed a social support self assessment to identify people who are supportive and to evaluate the effectiveness of their social support system.  Patients were taught and gained awareness of characteristics of an effective support and how to develop this in their lives.  Patients identified both personal strengths and opportunities for growth in this areas to improve overall communication and connection with other people.    Patient Session Goals / Objectives:    Identified strengths and opportunities for growth in developing their social support system and how this impacts their ability to connect and communicate with other people       Improved awareness of important aspects of social support systems and how this relates to mental health recovery        Established a plan for practice of these skills in their own environments    Practiced and reflected on how to generalize taught skills to their everyday life    Patient Participation / Response:  Fully participated with the group by sharing personal reflections / insights and openly received / provided feedback with other participants.    Patient presentation: Calm ,alert and focused with stable mood. Social with other group members, Verbalized understanding of content and Patient would benefit from additional opportunities to practice the content to be able to generalize it to their everyday life with increased intentionality, consistency, and efficacy in support of their psychiatric recovery      Treatment Plan:  Patient has a current master individualized treatment plan.  See Epic treatment plan for  more information.          Reginaldo Putnam, OTR/L

## 2019-07-16 NOTE — PROGRESS NOTES
Past Medical History:   Diagnosis Date     Depressive disorder        Current Outpatient Medications:      aspirin-acetaminophen-caffeine (EXCEDRIN MIGRAINE) 250-250-65 MG per tablet, Take 1 tablet by mouth every 6 hours as needed for headaches, Disp: , Rfl:      cholecalciferol (VITAMIN D3) 1000 UNIT tablet, Take 1,000 Units by mouth daily, Disp: , Rfl:      venlafaxine (EFFEXOR-ER) 225 MG 24 hr tablet, Take 1 tablet (225 mg) by mouth daily, Disp: 90 tablet, Rfl: 1    Psychiatry staffing: case discussed  Diagnosis:  MDD, ZOILA, working on decreasing substance use.

## 2019-07-17 NOTE — PROGRESS NOTES
"Adult Mental Health Outpatient Group Therapy Progress Note     Client Initial Individualized Goals for Treatment: \"Get better at managing day to day stuff.  I want to be able to successfully live my life with symptoms\".    See Initial Treatment suggestions for the client during the time between Diagnostic Assessment and completion of the Master Individualized Treatment Plan.    Treatment Goals:  1. Client will notify staff when needing assistance to develop or implement a coping plan to manage suicidal or self injurious urges.  Client will use coping plan for safety, as needed.  2. Dominic will learn and practice cognitive and behavioral skills to manage depressive and anxiety symptoms.  He will identify and challenge negative self-talk.  3.When in life skills group Dominic report progress to manage his finances,effectively use his abilities and be more involved in his daily roles providing an update weekly.  4.Dominic will improve wellness related behaviors by getting enough sleep,exercise, balanced nutrition and take medications (if prescribed) to maintain good mental health  5..Dominic will establish an aftercare plan to include medical providers and social supports by discharge.    Area of Treatment Focus:  Symptom Management, Personal Safety and Community Resources/Discharge Planning    Therapeutic Interventions/Treatment Strategies:  Support, Feedback, Safety Assessments, Education and Cognitive Behavioral Therapy    Response to Treatment Strategies:  Gave Feedback, Listened, Focused on Goals and Attentive     Name of Group: Group Psychotherapy I and Group Psychotherapy II Date/Time: 7/16/19 / 1300 to 1350 and 1400 to 1450    Number of Group Participants: 4    Description and Outcome:  Dominic reported improved sleep, despite having a nap in the afternoon.  He stated that he is sleeping about 8 hours per night up to 10 hours per night.  The nap is usually 6 pm to 9 pm and is about three hours in length.  Client reported " that he is having mild headaches but no migraines.  He stated that he is interested in working and would like to try part time employment.  He stated that he is smoking marijuana frequently but in small quantities.  He stated that he does not like to smoke marijuana in front of his Mom, so that limits his use.  He stated that he found out that his ex-girlfriend is dating, but he is less distressed bout that than he thinks he would have been previously.  He stated that he needs to find a new individual therapist and would prefer a male therapist.  Client denied suicidal ideation, intent and plan.     Group Therapy II:  Client participated in a discussion about discharge planning.  Client identified current supports in areas including professional support, social support, financial support, productivity needs, and coping skills.  Client identified areas that need to be developed or implemented.      Client demonstrated understanding of session content by sharing symptoms and stressors.    Is this a Weekly Review of the Progress on the Treatment Plan?  No

## 2019-07-18 ENCOUNTER — HOSPITAL ENCOUNTER (OUTPATIENT)
Dept: BEHAVIORAL HEALTH | Facility: CLINIC | Age: 24
End: 2019-07-18
Attending: PSYCHIATRY & NEUROLOGY
Payer: COMMERCIAL

## 2019-07-18 PROCEDURE — 90853 GROUP PSYCHOTHERAPY: CPT

## 2019-07-18 PROCEDURE — G0177 OPPS/PHP; TRAIN & EDUC SERV: HCPCS

## 2019-07-18 NOTE — PROGRESS NOTES
"  Adult Mental Health Outpatient Group Therapy Progress Note     Client Initial Individualized Goals for Treatment: \"Get better at managing day to day stuff.  I want to be able to successfully live my life with symptoms\".     See Initial Treatment suggestions for the client during the time between Diagnostic Assessment and completion of the Master Individualized Treatment Plan.     Treatment Goals:  1. Client will notify staff when needing assistance to develop or implement a coping plan to manage suicidal or self injurious urges.  Client will use coping plan for safety, as needed.  2. Dominic will learn and practice cognitive and behvioral skills to manage depressive and anxiety symptoms.  He will identify and challenge negative self-talk.  3.When in life skills group Dominic report progress to manage his finances,effectively use his abilities and be more involved in his daily roles providing an update weekly.  4.Dominic will improve wellness related behaviors by getting enough sleep,exercise, balanced nutrition and take medications (if prescribed) to maintain good mental health  5..Dominic will establish an aftercare plan to include medical providers and social supports by discharge.       Area of Treatment Focus:  Symptom Management    Therapeutic Interventions/Treatment Strategies:  Support, Feedback, Structured Activity and Clarification    Response to Treatment Strategies:  Accepted Feedback, Gave Feedback, Listened, Focused on Goals, Attentive, Accepted Support and Alert    Name of Group:  Mental health management 300-350, 7 participants     Description and Outcome:  The group was provided with a guided breathing and warmup structure with focus on increasing self awareness and on providing an embodied experience.  The group explored theme of balance, oscillating between being slightly off center and returning to center.  The group discussed how they experience balance in life.  They used a prop to further explore " "this  Topic to explore the individual center vs. The group center.  Client demonstrated understanding of session by engaging in experiential and contributing to discussion.  Dominic shared his use of the metaphor \"in alignment\" to describe his experience with seeing individual therapist.    Is this a Weekly Review of the Progress on the Treatment Plan?  No        "

## 2019-07-18 NOTE — PROGRESS NOTES
"Adult Mental Health Outpatient Group Therapy Progress Note     Client Initial Individualized Goals for Treatment: \"Get better at managing day to day stuff.  I want to be able to successfully live my life with symptoms\".    See Initial Treatment suggestions for the client during the time between Diagnostic Assessment and completion of the Master Individualized Treatment Plan.    Treatment Goals:  1. Client will notify staff when needing assistance to develop or implement a coping plan to manage suicidal or self injurious urges.  Client will use coping plan for safety, as needed.  2. Dominic will learn and practice cognitive and behvioral skills to manage depressive and anxiety symptoms.  He will identify and challenge negative self-talk.  3.When in life skills group Dominic report progress to manage his finances,effectively use his abilities and be more involved in his daily roles providing an update weekly.  4.Dominic will improve wellness related behaviors by getting enough sleep,exercise, balanced nutrition and take medications (if prescribed) to maintain good mental health  5..Dominic will establish an aftercare plan to include medical providers and social supports by discharge.    Area of Treatment Focus:  Symptom Management, Personal Safety and Community Resources/Discharge Planning    Therapeutic Interventions/Treatment Strategies:  Support, Feedback, Safety Assessments, Education and Cognitive Behavioral Therapy    Response to Treatment Strategies:  Gave Feedback, Listened, Focused on Goals and Attentive     Name of Group: Group Psychotherapy I and Group Psychotherapy II Date/Time: 7/18/19 / 1300 to 1350 and 1400 to 1450    Number of Group Participants: 7    Description and Outcome:  Dominic stated that he had slept well.  He stated that he continues to feel tired in the middle of the day.  He stated that he is taking a nap daily.  He stated that he had a difficult conversation with his Mom over money, but it went " better than he had anticipated.  Client denied suicidal ideation, intent and plan.     Group Psychotherapy II: Writer reviewed communications skills.  Clients shared challenging communication areas including managing irritable mood, difficult topics including money, and interpersonal conflicts over other serious topics.  Client identified skills they use to manage these situations.     Client demonstrated understanding of session content by sharing symptoms and stressors.    Is this a Weekly Review of the Progress on the Treatment Plan?    Yes.      Are Treatment Plan Goals being addressed?  Yes, continue treatment goals      Are Treatment Plan Strategies to Address Goals Effective?  Yes, continue treatment strategies      Are there any current contracts in place?  No

## 2019-07-22 ENCOUNTER — HOSPITAL ENCOUNTER (OUTPATIENT)
Dept: BEHAVIORAL HEALTH | Facility: CLINIC | Age: 24
End: 2019-07-22
Attending: PSYCHIATRY & NEUROLOGY
Payer: COMMERCIAL

## 2019-07-22 PROCEDURE — 90853 GROUP PSYCHOTHERAPY: CPT

## 2019-07-22 PROCEDURE — G0177 OPPS/PHP; TRAIN & EDUC SERV: HCPCS

## 2019-07-22 NOTE — PROGRESS NOTES
"Adult Mental Health Outpatient Group Therapy Progress Note     Client Initial Individualized Goals for Treatment: \"Get better at managing day to day stuff.  I want to be able to successfully live my life with symptoms\".    See Initial Treatment suggestions for the client during the time between Diagnostic Assessment and completion of the Master Individualized Treatment Plan.    Treatment Goals:  1. Client will notify staff when needing assistance to develop or implement a coping plan to manage suicidal or self injurious urges.  Client will use coping plan for safety, as needed.  2. Dominic will learn and practice cognitive and behavioral skills to manage depressive and anxiety symptoms.  He will identify and challenge negative self-talk.  3.When in life skills group Dominic report progress to manage his finances,effectively use his abilities and be more involved in his daily roles providing an update weekly.  4.Dominic will improve wellness related behaviors by getting enough sleep,exercise, balanced nutrition and take medications (if prescribed) to maintain good mental health  5..Dominic will establish an aftercare plan to include medical providers and social supports by discharge.    Area of Treatment Focus:  Symptom Management, Personal Safety and Community Resources/Discharge Planning    Therapeutic Interventions/Treatment Strategies:  Support, Feedback, Safety Assessments, Education and Cognitive Behavioral Therapy    Response to Treatment Strategies:  Gave Feedback, Listened, Focused on Goals and Attentive     Name of Group: Group Psychotherapy I and Group Psychotherapy II Date/Time: 7/22/19 / 1300 to 1350 and 1400 to 1450    Number of Group Participants: 7    Description and Outcome:  Dominic reported having a good weekend with ability to tolerate anxiety of going out to a bar and going dancing.  He stated that he was able to moderate his alcohol use so that he did not feel badly the next day.  He stated that he was " able to ask a woman for her phone number. He reported some variability in his sleep. Client denied suicidal ideation, intent and plan.     Group Psychotherapy II: Client participated in a discussion about mindfulness.  Writer reviewed skills of observe, describe, and participate.  Writer reviewed handout with practice ideas to increase practice of mindfulness in daily life.  Client shared examples of skills they currently use and would like to try.    Client demonstrated understanding of session content by sharing symptoms and stressors.    Is this a Weekly Review of the Progress on the Treatment Plan?  No

## 2019-07-25 ENCOUNTER — HOSPITAL ENCOUNTER (OUTPATIENT)
Dept: BEHAVIORAL HEALTH | Facility: CLINIC | Age: 24
End: 2019-07-25
Attending: PSYCHIATRY & NEUROLOGY
Payer: COMMERCIAL

## 2019-07-25 PROCEDURE — G0177 OPPS/PHP; TRAIN & EDUC SERV: HCPCS

## 2019-07-25 PROCEDURE — 90853 GROUP PSYCHOTHERAPY: CPT

## 2019-07-25 NOTE — PROGRESS NOTES
"Adult Mental Health Outpatient Group Therapy Progress Note     Client Initial Individualized Goals for Treatment: \"Get better at managing day to day stuff.  I want to be able to successfully live my life with symptoms\".    See Initial Treatment suggestions for the client during the time between Diagnostic Assessment and completion of the Master Individualized Treatment Plan.    Treatment Goals:  1. Client will notify staff when needing assistance to develop or implement a coping plan to manage suicidal or self injurious urges.  Client will use coping plan for safety, as needed.  2. Dominic will learn and practice cognitive and behvioral skills to manage depressive and anxiety symptoms.  He will identify and challenge negative self-talk.  3.When in life skills group Dominic report progress to manage his finances,effectively use his abilities and be more involved in his daily roles providing an update weekly.  4.Dominic will improve wellness related behaviors by getting enough sleep,exercise, balanced nutrition and take medications (if prescribed) to maintain good mental health  5..Dominic will establish an aftercare plan to include medical providers and social supports by discharge.    Area of Treatment Focus:  Symptom Management, Personal Safety and Community Resources/Discharge Planning    Therapeutic Interventions/Treatment Strategies:  Support, Feedback, Safety Assessments, Education and Cognitive Behavioral Therapy    Response to Treatment Strategies:  Gave Feedback, Listened, Focused on Goals and Attentive     Name of Group: Group Psychotherapy I and Group Psychotherapy II Date/Time: 7/25/19 / 1300 to 1350 and 1400 to 1450    Number of Group Participants: 4    Description and Outcome:  Dominic stated that he was pleased that when he was sick on Tuesday he did not feel depressed also.  He stated that he was able to connect with his birth Mom and mow her lawn, help with gardening and go out to dinner.  He stated that there " relationship was been improving.  He reported decreased anxiety about their relationship. Client denied suicidal ideation, intent and plan.     Group Psychotherapy II: Client participated in an educational activity about mood tracking.  Writer provided a mood tracking handout for clients to complete.  Writer discussed the use of the mood tracker to notice variations in mood symptoms, to combat hopelessness about rate of progress, to increase symptom awareness, and to increase accuracy of report to psychiatry providers.    Client demonstrated understanding of session content by sharing symptoms and stressors.    Is this a Weekly Review of the Progress on the Treatment Plan?    Yes.      Are Treatment Plan Goals being addressed?  Yes, continue treatment goals      Are Treatment Plan Strategies to Address Goals Effective?  Yes, continue treatment strategies      Are there any current contracts in place?  No

## 2019-07-25 NOTE — PROGRESS NOTES
"Adult Mental Health Outpatient Group Therapy Progress Note     Client Initial Individualized Goals for Treatment: \"Get better at managing day to day stuff.  I want to be able to successfully live my life with symptoms\".     See Initial Treatment suggestions for the client during the time between Diagnostic Assessment and completion of the Master Individualized Treatment Plan.     Treatment Goals:  1. Client will notify staff when needing assistance to develop or implement a coping plan to manage suicidal or self injurious urges.  Client will use coping plan for safety, as needed.  2. Dominic will learn and practice cognitive and behvioral skills to manage depressive and anxiety symptoms.  He will identify and challenge negative self-talk.  3.When in life skills group Dominic report progress to manage his finances,effectively use his abilities and be more involved in his daily roles providing an update weekly.  4.Dominic will improve wellness related behaviors by getting enough sleep,exercise, balanced nutrition and take medications (if prescribed) to maintain good mental health  5..Dominic will establish an aftercare plan to include medical providers and social supports by discharge.     Area of Treatment Focus:  Symptom Management    Therapeutic Interventions/Treatment Strategies:  Support, Feedback, Structured Activity, Clarification and Education    Response to Treatment Strategies:  Accepted Feedback, Gave Feedback, Listened, Focused on Goals, Attentive, Accepted Support and Alert     Name of Group: mental health management  Date/Time: 7/25 / 19 300 to 350    Number of Group Participants: 4     Description and Outcome:  The group began group with brief body scan, and gentle movement.  Imagery was introduced encouraging clients to imagine what gives them hope.  Clients were asked to draw inspiration from the seasons and represent this image on paper.  Client demonstrated understanding of session by identifying an image " that gives them hope.      Client would benefit from additional opportunities to practice and implement content from this session.    Is this a Weekly Review of the Progress on the Treatment Plan?  No

## 2019-07-25 NOTE — PROGRESS NOTES
Adult Mental Health Day Treatment  TRACK: 3C    PATIENT'S NAME: Dominic Workman  MRN:   [id number]  :   1995  ACCT. NUMBER: [id number]  DATE OF SERVICE: 19  START TIME: 1500  END TIME: 1550  NUMBER OF PARTICIPANTS: 7      Summary of Group / Topics Discussed:  Cognitive Functioning:Cognitive Functioning: Patients were taught and provided with an opportunity to gain awareness of how their mental health symptoms impact their current cognitive functioning as well as how this impacts their performance and participation in meaningful roles, relationships, and routines.  Patients were taught skills and strategies on how to monitor and improve cognitive performance through remediation or compensatory strategies.  Patients were given opportunities to practice taught skills and techniques in session and how to apply to everyday life.  Particular focus was on decision making styles and effectiveness.     Patient Session Goals / Objectives:    Identified how their mental health symptoms impact their functioning, focusing on specific cognitive challenges     Improved awareness of specific remediation and/or compensatory strategies to improve  executive functioning skills and how this relates to mental health recovery        Established a plan for practice of these skills in their own environments    Practiced and reflected on how to generalize taught skills to their everyday life    Patient Participation / Response:  Fully participated with the group by sharing personal reflections / insights and openly received / provided feedback with other participants.    Patient presentation: social with peers, congruent affect, even mood and Patient would benefit from additional opportunities to practice the content to be able to generalize it to their everyday life with increased intentionality, consistency, and efficacy in support of their psychiatric recovery    Treatment Plan:  Patient has a current master individualized  treatment plan.  See Epic treatment plan for more information.          Cindy Lindquist, OTR/L

## 2019-07-29 ENCOUNTER — HOSPITAL ENCOUNTER (OUTPATIENT)
Dept: BEHAVIORAL HEALTH | Facility: CLINIC | Age: 24
End: 2019-07-29
Attending: PSYCHIATRY & NEUROLOGY
Payer: COMMERCIAL

## 2019-07-29 PROCEDURE — 90853 GROUP PSYCHOTHERAPY: CPT

## 2019-07-29 PROCEDURE — G0177 OPPS/PHP; TRAIN & EDUC SERV: HCPCS

## 2019-07-29 NOTE — PROGRESS NOTES
"Adult Mental Health Day Treatment  TRACK: 3C    PATIENT'S NAME: Dominic Workman  MRN:   [id number]  :   1995  ACCT. NUMBER: [id number]  DATE OF SERVICE: 19  START TIME: 1500  END TIME: 1550  NUMBER OF PARTICIPANTS: 4      Summary of Group / Topics Discussed:  Communication and Social Skills Development: Social Supports: Social Risk Taking: Patients explored and evaluated how effective they are in different aspects of social risk taking.  Patients gained awareness of different areas in which they need/want to take risks, possible benefits of taking this risk, and action steps to take.  Patients identified both personal strengths and opportunities for growth in social risk taking to improve overall communication and connection with other people.      Patient Session Goals / Objectives:    Identified strengths and opportunities for growth in social risk taking skills and how these impact their ability to communicate clearly with other people       Improved awareness of important aspects of social risk taking skills and how this relates to mental health recovery        Established a plan for practice of these skills in their own environments    Practiced and reflected on how to generalize taught skills to their everyday life    Patient Participation / Response:  Moderately participated, sharing some personal reflections / insights and adequately adequately received / provided feedback with other participants.  Reported he tends to attend social functions with a \"gael\" and this makes it easier.  Reported it also can be a negative because then he doesn't meet new people.       Patient presentation: even mood, social with peers and Patient would benefit from additional opportunities to practice the content to be able to generalize it to their everyday life with increased intentionality, consistency, and efficacy in support of their psychiatric recovery    Treatment Plan:  Patient has a current master " individualized treatment plan.  See Epic treatment plan for more information.          Cindy Lindquist, OTR/L

## 2019-07-29 NOTE — SIGNIFICANT EVENT
Patient was working on his ongoing wood project when the wood tool he was using accidentally slipped and punctured his left hand between his thumb and forefinger.  Patient washed his hands.  Arianne Schultz RN assessed the wound (See note dated 7/29/19 for details).  Patient applied a band-aid and reported he has a current tetanus shot.     At end of session, Patient reported the wound was not bleeding and he was not in any pain.  Agreeable to seeking care if wound shows any signs of infection.

## 2019-07-29 NOTE — SIGNIFICANT EVENT
Writer was asked to observe small wound with bleeding that stopped without treatment, on left hand between thumb and forefinger. Approx 1/2 cm.    Pt was completing a project and accidentally sustained small puncture type wound with a wood tool.    Pt states he does not want to go to ER, and will have mom (MD) look at wound.  He lives with her.  He washed hands at sink, and applied band-aid.    Reports having a current tetanus shot.  Writer recommended pt be seen at urgent care, but pt declines at this time.  He agrees to be seen if any signs of infection occur (redness, pain, swelling, drainage)

## 2019-07-30 NOTE — PROGRESS NOTES
"Adult Mental Health Outpatient Group Therapy Progress Note     Client Initial Individualized Goals for Treatment: \"Get better at managing day to day stuff.  I want to be able to successfully live my life with symptoms\".    See Initial Treatment suggestions for the client during the time between Diagnostic Assessment and completion of the Master Individualized Treatment Plan.    Treatment Goals:  1. Client will notify staff when needing assistance to develop or implement a coping plan to manage suicidal or self injurious urges.  Client will use coping plan for safety, as needed.  2. Dominic will learn and practice cognitive and behavioral skills to manage depressive and anxiety symptoms.  He will identify and challenge negative self-talk.  3.When in life skills group Dominic report progress to manage his finances,effectively use his abilities and be more involved in his daily roles providing an update weekly.  4.Dominic will improve wellness related behaviors by getting enough sleep,exercise, balanced nutrition and take medications (if prescribed) to maintain good mental health  5..Dominic will establish an aftercare plan to include medical providers and social supports by discharge.    Area of Treatment Focus:  Symptom Management, Personal Safety and Community Resources/Discharge Planning    Therapeutic Interventions/Treatment Strategies:  Support, Feedback, Safety Assessments, Education and Cognitive Behavioral Therapy    Response to Treatment Strategies:  Gave Feedback, Listened, Focused on Goals and Attentive     Name of Group: Group Psychotherapy I and Group Psychotherapy II Date/Time: 7/29/19 / 1300 to 1350 and 1400 to 1450    Number of Group Participants: 5    Description and Outcome:  Dominic reported hanging out with his male friends which he enjoyed.  He stated that he is having anxiety about running out of marijuana.  She stated that his sleep had been messed up, but now he is sleeping 8 hours rather than 15.  He " stated that he feels less groggy when getting 8 hours of sleep.Client denied suicidal ideation, intent and plan.     Group Psychotherapy II: Client participated in a discussion about assertive communication skills.  Writer reviewed passive, aggressive, passive-aggressive and assertive communication styles.  Client completed a self-assessment and discussed areas for growth.      Client demonstrated understanding of session content by sharing symptoms and stressors.    Is this a Weekly Review of the Progress on the Treatment Plan?  No

## 2019-08-01 ENCOUNTER — HOSPITAL ENCOUNTER (OUTPATIENT)
Dept: BEHAVIORAL HEALTH | Facility: CLINIC | Age: 24
End: 2019-08-01
Attending: PSYCHIATRY & NEUROLOGY
Payer: COMMERCIAL

## 2019-08-01 PROCEDURE — 90853 GROUP PSYCHOTHERAPY: CPT

## 2019-08-01 NOTE — PROGRESS NOTES
"Adult Mental Health Outpatient Group Therapy Progress Note     Client Initial Individualized Goals for Treatment: \"Get better at managing day to day stuff.  I want to be able to successfully live my life with symptoms\".    See Initial Treatment suggestions for the client during the time between Diagnostic Assessment and completion of the Master Individualized Treatment Plan.    Treatment Goals:  1. Client will notify staff when needing assistance to develop or implement a coping plan to manage suicidal or self injurious urges.  Client will use coping plan for safety, as needed.  2. Dominic will learn and practice cognitive and behvioral skills to manage depressive and anxiety symptoms.  He will identify and challenge negative self-talk.  3.When in life skills group Dominic report progress to manage his finances,effectively use his abilities and be more involved in his daily roles providing an update weekly.  4.Dominic will improve wellness related behaviors by getting enough sleep,exercise, balanced nutrition and take medications (if prescribed) to maintain good mental health  5..Dominic will establish an aftercare plan to include medical providers and social supports by discharge.    Area of Treatment Focus:  Symptom Management, Personal Safety and Community Resources/Discharge Planning    Therapeutic Interventions/Treatment Strategies:  Support, Feedback, Safety Assessments, Education and Cognitive Behavioral Therapy    Response to Treatment Strategies:  Gave Feedback, Listened, Focused on Goals and Attentive     Name of Group: Group Psychotherapy I and Group Psychotherapy II Date/Time: 8/1/19 / 1300 to 1350 and 1400 to 1450    Number of Group Participants: 4    Description and Outcome:  Dominic stated that he had a boring couple of days.  He stated that he continues to have some stomach issues.  He stated that he did hang out with his liban friends which he enjoyed.  He also plans to see one of his female friends today.  He " stated that he has a date on Friday, which he is somewhat nervous about.  He stated that he is having some increased grief over the end of the last relationship and the fact that she does not want to even have brief social contact with him at this time.  Client denied suicidal ideation, intent and plan.     Group Psychotherapy II:  Client participated in a discussion on conflict resolution skills.  Client shared their usual conflict styles and the benefits and challenges of these styles.  Writer reviewed skills to promote improved conflict resolution process.  Writer reviewed ways to increase use of skills that promote positive self-regard and respect for the other person..    Client demonstrated understanding of session content by sharing symptoms and stressors.    Is this a Weekly Review of the Progress on the Treatment Plan?    Yes.      Are Treatment Plan Goals being addressed?  Yes, continue treatment goals      Are Treatment Plan Strategies to Address Goals Effective?  Yes, continue treatment strategies      Are there any current contracts in place?  No

## 2019-08-05 ENCOUNTER — HOSPITAL ENCOUNTER (OUTPATIENT)
Dept: BEHAVIORAL HEALTH | Facility: CLINIC | Age: 24
End: 2019-08-05
Attending: PSYCHIATRY & NEUROLOGY
Payer: COMMERCIAL

## 2019-08-05 PROCEDURE — 90853 GROUP PSYCHOTHERAPY: CPT

## 2019-08-05 PROCEDURE — G0177 OPPS/PHP; TRAIN & EDUC SERV: HCPCS

## 2019-08-05 NOTE — PROGRESS NOTES
Adult Mental Health Day Treatment  TRACK: 3C    PATIENT'S NAME: Dominic Workman  MRN:   [id number]  :   1995  ACCT. NUMBER: [id number]  DATE OF SERVICE: 19  START TIME: 3:00   END TIME: 3:50  NUMBER OF PARTICIPANTS: 4      Summary of Group / Topics Discussed:  Lifestyle Balance and Structure: Benefits of Leisure on Mental Health: Leisure Resource Inventory: Patients explored and gained awareness of leisure values, benefits, and interests focusing on specific areas of self-development, self-enjoyment, self-support, and self-expenditure.   Patients identified specific activities and skills to employ for effective use of leisure for mental health management and quality of life.      Patient Session Goals / Objectives:    Increased awareness of the importance of engagement in leisure activities to support lifestyle balance and perceived quality of life    Identified specific leisure activities and skills to support mental health management      Facilitated exploration of meaningful leisure interests and values    Practiced and reflected on how to generalize taught skills to their everyday life    Patient Participation / Response:  Fully participated with the group by sharing personal reflections / insights and openly received / provided feedback with other participants.    Verbalized understanding of content and Patient would benefit from additional opportunities to practice the content to be able to generalize it to their everyday life with increased intentionality, consistency, and efficacy in support of their psychiatric recovery    Treatment Plan:  Patient has a current master individualized treatment plan.  See Epic treatment plan for more information.          Cesar Trevizo

## 2019-08-06 ENCOUNTER — HOSPITAL ENCOUNTER (OUTPATIENT)
Dept: BEHAVIORAL HEALTH | Facility: CLINIC | Age: 24
End: 2019-08-06
Attending: PSYCHIATRY & NEUROLOGY
Payer: COMMERCIAL

## 2019-08-06 PROCEDURE — 90853 GROUP PSYCHOTHERAPY: CPT

## 2019-08-06 PROCEDURE — G0177 OPPS/PHP; TRAIN & EDUC SERV: HCPCS

## 2019-08-06 NOTE — PROGRESS NOTES
"Adult Mental Health Outpatient Group Therapy Progress Note     Client Initial Individualized Goals for Treatment: \"Get better at managing day to day stuff.  I want to be able to successfully live my life with symptoms\".    See Initial Treatment suggestions for the client during the time between Diagnostic Assessment and completion of the Master Individualized Treatment Plan.    Treatment Goals:  1. Client will notify staff when needing assistance to develop or implement a coping plan to manage suicidal or self injurious urges.  Client will use coping plan for safety, as needed.  2. Dominic will learn and practice cognitive and behavioral skills to manage depressive and anxiety symptoms.  He will identify and challenge negative self-talk.  3.When in life skills group Dominic report progress to manage his finances,effectively use his abilities and be more involved in his daily roles providing an update weekly.  4.Dominic will improve wellness related behaviors by getting enough sleep,exercise, balanced nutrition and take medications (if prescribed) to maintain good mental health  5..Dominic will establish an aftercare plan to include medical providers and social supports by discharge.    Area of Treatment Focus:  Symptom Management, Personal Safety and Community Resources/Discharge Planning    Therapeutic Interventions/Treatment Strategies:  Support, Feedback, Safety Assessments, Education and Cognitive Behavioral Therapy    Response to Treatment Strategies:  Gave Feedback, Listened, Focused on Goals and Attentive     Name of Group: Group Psychotherapy I and Group Psychotherapy II Date/Time: 8/6/19 / 1300 to 1350 and 1400 to 1450    Number of Group Participants: 4    Description and Outcome:  Dominic reported having a migraine headache.  He stated that he slept about 12 hours and feels better now.  He stated that the frequency of the headaches has increased lately.  Client set a goal to call his insurance for male " psychotherapists in the [place] area during the day tomorrow.  Client is preparing to discharge on 8/22/19.  Client denied suicidal ideation, intent and plan.     Group Psychotherapy II:  Client participated in a discussion on identifying personal strengths and how they relate to the management of mental health symptoms.  Group members completed an exercise on assessment and recognition of strengths with applications to changing negative thoughts and core beliefs.      Client demonstrated understanding of session content by sharing symptoms and stressors.    Is this a Weekly Review of the Progress on the Treatment Plan?  No

## 2019-08-06 NOTE — PROGRESS NOTES
"Adult Mental Health Outpatient Group Therapy Progress Note     Client Initial Individualized Goals for Treatment: \"Get better at managing day to day stuff.  I want to be able to successfully live my life with symptoms\".    See Initial Treatment suggestions for the client during the time between Diagnostic Assessment and completion of the Master Individualized Treatment Plan.    Treatment Goals:  1. Client will notify staff when needing assistance to develop or implement a coping plan to manage suicidal or self injurious urges.  Client will use coping plan for safety, as needed.  2. Dominic will learn and practice cognitive and behavioral skills to manage depressive and anxiety symptoms.  He will identify and challenge negative self-talk.  3.When in life skills group Dominic report progress to manage his finances,effectively use his abilities and be more involved in his daily roles providing an update weekly.  4.Dominic will improve wellness related behaviors by getting enough sleep,exercise, balanced nutrition and take medications (if prescribed) to maintain good mental health  5..Dominic will establish an aftercare plan to include medical providers and social supports by discharge.    Area of Treatment Focus:  Symptom Management, Personal Safety and Community Resources/Discharge Planning    Therapeutic Interventions/Treatment Strategies:  Support, Feedback, Safety Assessments, Education and Cognitive Behavioral Therapy    Response to Treatment Strategies:  Gave Feedback, Listened, Focused on Goals and Attentive     Name of Group: Group Psychotherapy I and Group Psychotherapy II Date/Time: 8/5/19 / 1300 to 1350 and 1400 to 1450    Number of Group Participants: 4    Description and Outcome:  Dominic reported spending time with friends.  He stated that he was the sober  for th night, which he enjoyed.  He stated that he had the date with the woman and they hung out in hammocks.  He stated that he attended a concert alone " on Sunday, which he enjoyed.  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  He stated that he is feeling that his medications are working well.  He stated that his naps are only one hour in length.  Client denied suicidal ideation, intent and plan.     Group Psychotherapy II: Client participated in a discussion on identifying values.  Client picked three most important values and shared ways they are living out that value in daily life.  Client also identified a value they were having difficulty practicing.  Writer reviewed ways to use values to increase positive self-talk and core beliefs.    Client demonstrated understanding of session content by sharing symptoms and stressors.    Is this a Weekly Review of the Progress on the Treatment Plan?  No

## 2019-08-06 NOTE — PROGRESS NOTES
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  Working on [facility] social support

## 2019-08-12 ENCOUNTER — HOSPITAL ENCOUNTER (OUTPATIENT)
Dept: BEHAVIORAL HEALTH | Facility: CLINIC | Age: 24
End: 2019-08-12
Attending: PSYCHIATRY & NEUROLOGY
Payer: COMMERCIAL

## 2019-08-12 PROCEDURE — G0177 OPPS/PHP; TRAIN & EDUC SERV: HCPCS

## 2019-08-12 PROCEDURE — 90853 GROUP PSYCHOTHERAPY: CPT

## 2019-08-12 NOTE — PROGRESS NOTES
"Adult Mental Health Outpatient Group Therapy Progress Note     Client Initial Individualized Goals for Treatment: \"Get better at managing day to day stuff.  I want to be able to successfully live my life with symptoms\".    See Initial Treatment suggestions for the client during the time between Diagnostic Assessment and completion of the Master Individualized Treatment Plan.    Treatment Goals:  1. Client will notify staff when needing assistance to develop or implement a coping plan to manage suicidal or self injurious urges.  Client will use coping plan for safety, as needed.  2. Dominic will learn and practice cognitive and behavioral skills to manage depressive and anxiety symptoms.  He will identify and challenge negative self-talk.  3.When in life skills group Dominic report progress to manage his finances,effectively use his abilities and be more involved in his daily roles providing an update weekly.  4.Dominic will improve wellness related behaviors by getting enough sleep,exercise, balanced nutrition and take medications (if prescribed) to maintain good mental health  5..Dominic will establish an aftercare plan to include medical providers and social supports by discharge.    Area of Treatment Focus:  Symptom Management, Personal Safety and Community Resources/Discharge Planning    Therapeutic Interventions/Treatment Strategies:  Support, Feedback, Safety Assessments, Education and Cognitive Behavioral Therapy    Response to Treatment Strategies:  Gave Feedback, Listened, Focused on Goals and Attentive     Name of Group: Group Psychotherapy I and Group Psychotherapy II Date/Time: 8/12/19 / 1300 to 1350 and 1400 to 1450    Number of Group Participants: 6    Description and Outcome:  Group Psychotherapy I: Dominic shared he has had a lot of anhedonia on Thursday and Friday of last week. He indicated it passed by the weekend and shared activities he did with his friends and also shared he went on a date on Sunday. He " shared he has some Voodoo conflicts with her though, so he is somewhat worried about it. He shared that he is working on enjoying the every day activities that he is doing. He also indicated that he is worried about a friend moving to [place] this fall that he spends time with regularly. Collaboratively problem solved as a group ways for him to have a solid support network when she moves. Dominic denied having any suicidal ideation.    Group Psychotherapy II:   Client participated in a discussion to learn about emotions and emotion regulation. Client learned about the purpose of emotions and what they do for us. Discussed on emotions communicate to other and communicates to ourselves. Discussed emotion identification, as well as healthy communication.    Client demonstrated understanding of session content by sharing symptoms and stressors.    Is this a Weekly Review of the Progress on the Treatment Plan?  No

## 2019-08-13 ENCOUNTER — HOSPITAL ENCOUNTER (OUTPATIENT)
Dept: BEHAVIORAL HEALTH | Facility: CLINIC | Age: 24
End: 2019-08-13
Attending: PSYCHIATRY & NEUROLOGY
Payer: COMMERCIAL

## 2019-08-13 PROCEDURE — 90853 GROUP PSYCHOTHERAPY: CPT

## 2019-08-13 PROCEDURE — G0177 OPPS/PHP; TRAIN & EDUC SERV: HCPCS

## 2019-08-13 NOTE — PROGRESS NOTES
"Adult Mental Health Outpatient Group Therapy Progress Note     Client Initial Individualized Goals for Treatment: \"Get better at managing day to day stuff.  I want to be able to successfully live my life with symptoms\".    See Initial Treatment suggestions for the client during the time between Diagnostic Assessment and completion of the Master Individualized Treatment Plan.    Treatment Goals:  1. Client will notify staff when needing assistance to develop or implement a coping plan to manage suicidal or self injurious urges.  Client will use coping plan for safety, as needed.  2. Dominic will learn and practice cognitive and behavioral skills to manage depressive and anxiety symptoms.  He will identify and challenge negative self-talk.  3.When in life skills group Dominic report progress to manage his finances,effectively use his abilities and be more involved in his daily roles providing an update weekly.  4.Dominic will improve wellness related behaviors by getting enough sleep,exercise, balanced nutrition and take medications (if prescribed) to maintain good mental health  5..Dominic will establish an aftercare plan to include medical providers and social supports by discharge.    Area of Treatment Focus:  Symptom Management, Personal Safety and Community Resources/Discharge Planning    Therapeutic Interventions/Treatment Strategies:  Support, Feedback, Safety Assessments, Education and Cognitive Behavioral Therapy    Response to Treatment Strategies:  Gave Feedback, Listened, Focused on Goals and Attentive     Name of Group: Group Psychotherapy I and Group Psychotherapy II Date/Time: 8/13/19 / 1300 to 1350 and 1400 to 1450    Number of Group Participants: 5    Description and Outcome:  Group Psychotherapy I: Dominic shared he has been having caffeine headaches, because he is trying to cut down on his caffeine intake. He shared he wants to cut down because of the headaches. He denied having any suicidal thoughts. He " shared he going to [facility] [employer] [employer] tomorrow to apply for a certificate program and see which of his credits will transfer over. He shared he is somewhat anxious about the outcome. Dominic was a good contributor in group and gave great feedback to other group members.    Group Psychotherapy II:  Client participated participated in a group focused on anger. Client reviewed with the group a packet on anger and anger management. The packet was discussed as a group. Topics that were covered included: coping skills, the purpose of anger, when anger becomes a problem, how anger affects ourselves, and how anger affects others. The group did a good job applying their life situations to the anger packet.  Client demonstrated understanding of session content by sharing symptoms and stressors.    Is this a Weekly Review of the Progress on the Treatment Plan?  No

## 2019-08-13 NOTE — PROGRESS NOTES
Adult Mental Health Day Treatment  TRACK: 3C    PATIENT'S NAME: Dominic Workman  MRN:   [id number]  :   1995  ACCT. NUMBER: [id number]  DATE OF SERVICE: 19  START TIME: 3:00  END TIME: 3:50  NUMBER OF PARTICIPANTS: 5      Summary of Group / Topics Discussed:  Mind/Body Practice & Complementary Therapies: Relaxation Techniques: In this group, patients were educated on a variety of relaxation and mindfulness skills to reduce distress and improve coping skills. The skills were taught to the group and then practiced through an organized exercise.     Skills taught & practiced included:  Personal Relaxation Scene, Deep Breathing Exercise, Guided Imagery - Dominic created his own relaxation scene using his five senses.       Patient Session Goals / Objectives:  ? Identified relaxation skills  ? Explained how the various relaxation skills are practiced  ? Practiced relaxation experiential      Patient Participation / Response:  Fully participated with the group by sharing personal reflections / insights and openly received / provided feedback with other participants.    Demonstrated understanding of topics discussed through group discussion and participation, Identified / Expressed personal readiness to practice skills and Verbalized understanding of Mind/Body Practice & Complementary Therapies topic   Dominic shared his relaxation scene as a beach at dusk in the rain. He shared other relaxation techniques and will continue to put into practice daily methods for relaxation.    Treatment Plan:  Patient has a current master individualized treatment plan.  See Epic treatment plan for more information.          KIMBERLY Silveira

## 2019-08-15 ENCOUNTER — HOSPITAL ENCOUNTER (OUTPATIENT)
Dept: BEHAVIORAL HEALTH | Facility: CLINIC | Age: 24
End: 2019-08-15
Attending: PSYCHIATRY & NEUROLOGY
Payer: COMMERCIAL

## 2019-08-15 PROCEDURE — 90853 GROUP PSYCHOTHERAPY: CPT

## 2019-08-15 PROCEDURE — G0177 OPPS/PHP; TRAIN & EDUC SERV: HCPCS

## 2019-08-15 NOTE — PROGRESS NOTES
"Adult Mental Health Outpatient Group Therapy Progress Note     Client Initial Individualized Goals for Treatment: \"Get better at managing day to day stuff.  I want to be able to successfully live my life with symptoms\".     See Initial Treatment suggestions for the client during the time between Diagnostic Assessment and completion of the Master Individualized Treatment Plan.     Treatment Goals:  1. Client will notify staff when needing assistance to develop or implement a coping plan to manage suicidal or self injurious urges.  Client will use coping plan for safety, as needed.  2. Dominic will learn and practice cognitive and behvioral skills to manage depressive and anxiety symptoms.  He will identify and challenge negative self-talk.  3.When in life skills group Dominic report progress to manage his finances,effectively use his abilities and be more involved in his daily roles providing an update weekly.  4.Dominic will improve wellness related behaviors by getting enough sleep,exercise, balanced nutrition and take medications (if prescribed) to maintain good mental health  5..Dominic will establish an aftercare plan to include medical providers and social supports by discharge.       Area of Treatment Focus:  Symptom Management    Therapeutic Interventions/Treatment Strategies:  Support, Feedback, Structured Activity, Clarification and Education    Response to Treatment Strategies:  Accepted Feedback, Gave Feedback, Listened, Focused on Goals, Attentive, Accepted Support and Alert     Name of Group: mental health management Date/Time: 8/15 / 19 300 to 350    Number of Group Participants: 8     Description and Outcome:  The group was given a structured journaling worksheet with the focus on feeling identification, expression and containment.  Information on the purpose and benefits of structured journaling was discussed.  Group members were offered the opportunity to share part, all, or none of their journaling and " were encouraged to comment on process. Dominic verbalized understanding of structure, choosing to work with the feeling of jealousy.  He reported finding structure helpful.      Client would benefit from additional opportunities to practice and implement content from this session.    Is this a Weekly Review of the Progress on the Treatment Plan?  No

## 2019-08-15 NOTE — PROGRESS NOTES
"Adult Mental Health Outpatient Group Therapy Progress Note     Client Initial Individualized Goals for Treatment: \"Get better at managing day to day stuff.  I want to be able to successfully live my life with symptoms\".    See Initial Treatment suggestions for the client during the time between Diagnostic Assessment and completion of the Master Individualized Treatment Plan.    Treatment Goals:  1. Client will notify staff when needing assistance to develop or implement a coping plan to manage suicidal or self injurious urges.  Client will use coping plan for safety, as needed.  2. Dominic will learn and practice cognitive and behavioral skills to manage depressive and anxiety symptoms.  He will identify and challenge negative self-talk.  3.When in life skills group Dominic report progress to manage his finances,effectively use his abilities and be more involved in his daily roles providing an update weekly.  4.Dominic will improve wellness related behaviors by getting enough sleep,exercise, balanced nutrition and take medications (if prescribed) to maintain good mental health  5..Dominic will establish an aftercare plan to include medical providers and social supports by discharge.    Area of Treatment Focus:  Symptom Management, Personal Safety and Community Resources/Discharge Planning    Therapeutic Interventions/Treatment Strategies:  Support, Feedback, Safety Assessments, Education and Cognitive Behavioral Therapy    Response to Treatment Strategies:  Gave Feedback, Listened, Focused on Goals and Attentive     Group:  Group Therapy Date and Time 8/15/2019 1317-4881 Group total: 5    Description and outcome:  Mental Health Professional checked it with group by asking them: how they are feeling, what goal they are working on, what skill will be used to reach their goal, what is something they are proud of, what might be a barrier to their goal, how the therapist can support them, and if they need additional time to " process.   Psychotherapist encouraged group to support each other by providing feedback as appropriate.   Dominic reports feeling positive, anxious, and cheerful today, he reports a plan to follow up on his application for college today, he reports proud that he came to work today.     Therapist validated and normalized emotions.  Pt accepted feedback from others and provided appropriate feedback to others in the group.    Pt did not endorse safety concerns at this time.    Pt reported taking all medications as prescribed.     Pt verbalized understanding of the session by engaging with the feedback from other group members.  Is this a Weekly Review of the Progress on the Treatment Plan?  No

## 2019-08-20 NOTE — PROGRESS NOTES
Adult Mental Health Day Treatment  TRACK: 3C    PATIENT'S NAME: Dominic Workman  MRN:   [id number]  :   1995  ACCT. NUMBER: [id number]  DATE OF SERVICE: 8/15/19  START TIME: 1300  END TIME: 1350  NUMBER OF PARTICIPANTS: 3      Summary of Group / Topics Discussed:  Sensory Approaches in Mental Health: Focused Activity: Patients were provided with verbal and experiential education to identify physical and sensorimotor based activities that can be utilized for stress management, self care, health maintenance, and self regulation.  Patients increased knowledge and awareness of activities that support good self care, build resiliency, and prevent relapse through healthy engagement in mindful focused activities for effective coping with illness and reduction of maladaptive coping skills.     Patient Session Goals / Objectives:    Identified specific physical and sensorimotor based activities for stress management, self care, health maintenance, and self regulation      Improved awareness of activities that are meaningful focused activities that support good self care, build resiliency, and prevent relapse and how this applies to current daily life    Established a plan for practice of these skills in their own environments    Practiced and reflected on how to generalize taught skills to their everyday life    Patient Participation / Response:  Fully participated with the group by sharing personal reflections / insights and openly received / provided feedback with other participants.    Patient presentation: bright affect, observed to use a lot of humor, social with peers, Verbalized understanding of content and Patient would benefit from additional opportunities to practice the content to be able to generalize it to their everyday life with increased intentionality, consistency, and efficacy in support of their psychiatric recovery    Treatment Plan:  Patient has a current master individualized treatment plan.  See  Epic treatment plan for more information.          Cindy Lindquist, OTR/L

## 2019-08-21 ENCOUNTER — TELEPHONE (OUTPATIENT)
Dept: BEHAVIORAL HEALTH | Facility: CLINIC | Age: 24
End: 2019-08-21

## 2019-08-22 ENCOUNTER — HOSPITAL ENCOUNTER (OUTPATIENT)
Dept: BEHAVIORAL HEALTH | Facility: CLINIC | Age: 24
End: 2019-08-22
Attending: PSYCHIATRY & NEUROLOGY
Payer: COMMERCIAL

## 2019-08-22 PROCEDURE — 90853 GROUP PSYCHOTHERAPY: CPT

## 2019-08-22 PROCEDURE — G0177 OPPS/PHP; TRAIN & EDUC SERV: HCPCS

## 2019-08-22 NOTE — PROGRESS NOTES
"Adult Mental Health Outpatient Group Therapy Progress Note     Client Initial Individualized Goals for Treatment: \"Get better at managing day to day stuff.  I want to be able to successfully live my life with symptoms\".    See Initial Treatment suggestions for the client during the time between Diagnostic Assessment and completion of the Master Individualized Treatment Plan.    Treatment Goals:  1. Client will notify staff when needing assistance to develop or implement a coping plan to manage suicidal or self injurious urges.  Client will use coping plan for safety, as needed.  2. Dominic will learn and practice cognitive and behavioral skills to manage depressive and anxiety symptoms.  He will identify and challenge negative self-talk.  3.When in life skills group Dominic report progress to manage his finances,effectively use his abilities and be more involved in his daily roles providing an update weekly.  4.Dominic will improve wellness related behaviors by getting enough sleep,exercise, balanced nutrition and take medications (if prescribed) to maintain good mental health  5..Dominic will establish an aftercare plan to include medical providers and social supports by discharge.    Area of Treatment Focus:  Symptom Management, Personal Safety and Community Resources/Discharge Planning    Therapeutic Interventions/Treatment Strategies:  Support, Feedback, Safety Assessments, Education and Cognitive Behavioral Therapy    Response to Treatment Strategies:  Gave Feedback, Listened, Focused on Goals and Attentive     Name of Group: Group Psychotherapy I and Group Psychotherapy II Date/Time: 8/22/19 / 1300 to 1350 and 1400 to 1450    Number of Group Participants: 7 for first hour and 5 for second hour    Description and Outcome:  Dominic reported feeling ready to complete the program.  He reported calm mood with a positive outlook.  He denied difficulty with negative self-talk.  He reported being regularly able to leave the " home as he wishes.  He stated that the social anxiety symptoms are decreased.  He stated that he is currently sleeping about 10-12 hours per day but he thinks that this is temporary.  Client shared discharge plan with the group.  Client was receptive to feedback from peers on their strengths and progress in the program. Client denied suicidal ideation, intent and plan.     Group Psychotherapy II:  Client participated in educational activity on skills to challenge perfectionistic thinking.  Writer provided information on using authenticity and reasonable expectations to challenge perfectionism.   Client shared experiences with perfectionistic thinking and ways they challenged the thought patterns.      Client demonstrated understanding of session content by sharing symptoms and stressors.    Is this a Weekly Review of the Progress on the Treatment Plan?  Yes.      Are Treatment Plan Goals being addressed?  Yes, continue treatment goals      Are Treatment Plan Strategies to Address Goals Effective?  Yes, continue treatment strategies      Are there any current contracts in place?  No

## 2019-08-23 NOTE — PROGRESS NOTES
Adult Mental Health Day Treatment  TRACK: 3C    PATIENT'S NAME: Dominic Workman  MRN:   [id number]  :   1995  ACCT. NUMBER: [id number]  DATE OF SERVICE: 19  START TIME: 1500  END TIME: 1550  NUMBER OF PARTICIPANTS: 5      Summary of Group / Topics Discussed:  Mindfulness: Mindfulness Experiential: Patients received an overview on what mindfulness is and how mindfulness can benefit general health, mental health symptoms, and stressors. The history of mindfulness, its application to mental health therapies, and key concepts were also discussed. Patients discussed current awareness, knowledge, and practice of mindfulness skills. Patients also discussed barriers to mindfulness practice.    Patient Session Goals / Objectives:    Demonstrated and verbalized understanding of key mindfulness concepts    Identified when/how to use mindfulness skills    Resolved barriers to practicing mindfulness skills    Identified plan to use mindfulness skills in daily life     Patient Participation / Response:  Fully participated with the group by sharing personal reflections / insights and openly received / provided feedback with other participants.    Demonstrated understanding of topics discussed through group discussion and participation, Demonstrated understanding of mindfulness skills and benefits of practice and Practiced skills in session    Treatment Plan:  Patient has See Epic Treatment Plan - Patient is discharging.        ALEE TianSW

## 2019-10-21 DIAGNOSIS — F33.1 MAJOR DEPRESSIVE DISORDER, RECURRENT EPISODE, MODERATE (H): ICD-10-CM

## 2019-10-21 RX ORDER — VENLAFAXINE HYDROCHLORIDE 225 MG/1
225 TABLET, EXTENDED RELEASE ORAL DAILY
Qty: 90 TABLET | Refills: 1 | Status: SHIPPED | OUTPATIENT
Start: 2019-10-21 | End: 2019-11-13

## 2019-10-21 NOTE — TELEPHONE ENCOUNTER
Prescription approved per [facility]. Roseann Sandoval RN October 21, 2019 1:15 PM

## 2019-10-21 NOTE — TELEPHONE ENCOUNTER
"Requested Prescriptions   Pending Prescriptions Disp Refills     venlafaxine (EFFEXOR-ER) 225 MG 24 hr tablet 90 tablet 1     Sig: Take 1 tablet (225 mg) by mouth daily  Last Written Prescription Date:  04/19/2019  Last Fill Quantity: 90,  # refills: 1   Last office visit: 4/19/2019 with prescribing provider:  04/19/2019   Future Office Visit:         Serotonin-Norepinephrine Reuptake Inhibitors  Failed - 10/21/2019 11:30 AM        Failed - PHQ-9 score of less than 5 in past 6 months     Please review last PHQ-9 score.           Failed - Normal serum creatinine on file in past 12 months     No lab results found.          Failed - Recent (6 mo) or future (30 days) visit within the authorizing provider's specialty     Patient had office visit in the last 6 months or has a visit in the next 30 days with authorizing provider or within the authorizing provider's specialty.  See \"Patient Info\" tab in inbasket, or \"Choose Columns\" in Meds & Orders section of the refill encounter.            Passed - Blood pressure under 140/90 in past 12 months     BP Readings from Last 3 Encounters:   04/19/19 126/82   01/11/19 138/84   09/25/18 126/76                 Passed - Medication is active on med list        Passed - Patient is age 18 or older        "

## 2019-11-02 ENCOUNTER — MYC REFILL (OUTPATIENT)
Dept: FAMILY MEDICINE | Facility: CLINIC | Age: 24
End: 2019-11-02

## 2019-11-02 DIAGNOSIS — F33.1 MAJOR DEPRESSIVE DISORDER, RECURRENT EPISODE, MODERATE (H): ICD-10-CM

## 2019-11-02 RX ORDER — VENLAFAXINE HYDROCHLORIDE 225 MG/1
225 TABLET, EXTENDED RELEASE ORAL DAILY
Qty: 90 TABLET | Refills: 1 | Status: CANCELLED | OUTPATIENT
Start: 2019-11-02

## 2019-11-04 NOTE — TELEPHONE ENCOUNTER
Writer notes Rx sent 10/21/2019 #90/1    [employer] message sent to patient requesting he contact pharmacy directly for refill of medication    Medication removed. Closing encounter, no further action required at this time    Thank You!  Marilu Mills, SUZANNE  Triage Nurse

## 2019-11-04 NOTE — TELEPHONE ENCOUNTER
"Requested Prescriptions   Pending Prescriptions Disp Refills     venlafaxine (EFFEXOR-ER) 225 MG 24 hr tablet 90 tablet 1     Sig: Take 1 tablet (225 mg) by mouth daily   Last Written Prescription Date:  10/21/19  Last Fill Quantity: 90 tablets,  # refills: 1   Last office visit: 4/19/2019 with prescribing provider:  Julia Maurer NP   Future Office Visit:   Next 5 appointments (look out 90 days)    Nov 15, 2019  2:45 PM CST  Katerina Ramírez with ZARA Grimes CNP  [facility] ([facility] [address] [place] [id number] [phone]             Serotonin-Norepinephrine Reuptake Inhibitors  Failed - 11/2/2019 11:03 AM        Failed - PHQ-9 score of less than 5 in past 6 months     Please review last PHQ-9 score.           Failed - Normal serum creatinine on file in past 12 months     No lab results found.          Passed - Blood pressure under 140/90 in past 12 months     BP Readings from Last 3 Encounters:   04/19/19 126/82   01/11/19 138/84   09/25/18 126/76                 Passed - Medication is active on med list        Passed - Patient is age 18 or older        Passed - Recent (6 mo) or future (30 days) visit within the authorizing provider's specialty     Patient had office visit in the last 6 months or has a visit in the next 30 days with authorizing provider or within the authorizing provider's specialty.  See \"Patient Info\" tab in inbasket, or \"Choose Columns\" in Meds & Orders section of the refill encounter.              "

## 2019-11-13 ENCOUNTER — MYC REFILL (OUTPATIENT)
Dept: FAMILY MEDICINE | Facility: CLINIC | Age: 24
End: 2019-11-13

## 2019-11-13 DIAGNOSIS — F33.1 MAJOR DEPRESSIVE DISORDER, RECURRENT EPISODE, MODERATE (H): ICD-10-CM

## 2019-11-13 RX ORDER — VENLAFAXINE HYDROCHLORIDE 225 MG/1
225 TABLET, EXTENDED RELEASE ORAL DAILY
Qty: 90 TABLET | Refills: 1 | Status: SHIPPED | OUTPATIENT
Start: 2019-11-13 | End: 2020-08-11

## 2019-11-13 NOTE — TELEPHONE ENCOUNTER
FLORENCIO Keyes    Patient coming in to see you on 11/15.    Spoke with patient and pharmacy regarding request for venlafaxine refill. Pharmacy gave patient 5 pills today and on 11/8 d/t patient not having a script to fill at pharmacy. Last Rx sent was 10/21, so patient should have had a script and should have pills. Pharmacy states patient had script transferred to a pharmacy in [place] d/t out of town for a wedding. Patient states he did not have script sent there, but did go to a pharmacy in [place] to get some venlafaxine for while out of town. Writer told patient to call pharmacy in [place] to have script sent over to his pharmacy in [place], because he should have an existing script from 10/21 that was sent, and insurance might not cover medication if patient just filled this. Verbalized understanding.    Radha Daugherty RN   [facility]

## 2020-03-10 ENCOUNTER — HEALTH MAINTENANCE LETTER (OUTPATIENT)
Age: 25
End: 2020-03-10

## 2020-08-10 DIAGNOSIS — F33.1 MAJOR DEPRESSIVE DISORDER, RECURRENT EPISODE, MODERATE (H): ICD-10-CM

## 2020-08-11 RX ORDER — VENLAFAXINE HYDROCHLORIDE 225 MG/1
225 TABLET, EXTENDED RELEASE ORAL DAILY
Qty: 90 TABLET | Refills: 1 | Status: SHIPPED | OUTPATIENT
Start: 2020-08-11 | End: 2021-02-08

## 2020-08-11 NOTE — TELEPHONE ENCOUNTER
Routing refill request to provider for review/approval because:  Patient needs to be seen because it has been more than 1 year since last office visit.    Due for labs, PHQ-9 (sent through my chart), BP does not meet RN refill protocol    Radha Daugherty RN   [facility]

## 2020-08-12 DIAGNOSIS — F33.1 MAJOR DEPRESSIVE DISORDER, RECURRENT EPISODE, MODERATE (H): ICD-10-CM

## 2020-08-12 RX ORDER — VENLAFAXINE HYDROCHLORIDE 225 MG/1
225 TABLET, EXTENDED RELEASE ORAL DAILY
Qty: 90 TABLET | Refills: 1 | Status: CANCELLED | OUTPATIENT
Start: 2020-08-12

## 2020-08-18 NOTE — TELEPHONE ENCOUNTER
Patient due for PHQ-9, office visit, and labs. Sent message through my chart to fill out PHQ-9 and to call office to setup visits.    Radha Daugherty RN   [facility]

## 2020-08-31 NOTE — TELEPHONE ENCOUNTER
Rx filled on 8/11/20 for 90# with 1 refill    Radha Daugherty RN   [facility]

## 2020-10-22 NOTE — PROGRESS NOTES
"Dominic Workman is a 25 year old male who is being evaluated via a billable video visit.  Mom, Lashonda, is attending video visit with patient.    The patient has been notified of following:     \"This video visit will be conducted via a call between you and your physician/provider. We have found that certain health care needs can be provided without the need for an in-person physical exam.  This service lets us provide the care you need with a video conversation.  If a prescription is necessary we can send it directly to your pharmacy.  If lab work is needed we can place an order for that and you can then stop by our lab to have the test done at a later time.    Video visits are billed at different rates depending on your insurance coverage.  Please reach out to your insurance provider with any questions.    If during the course of the call the physician/provider feels a video visit is not appropriate, you will not be charged for this service.\"    Patient has given verbal consent for Video visit? Yes  How would you like to obtain your AVS? MyChart  If you are dropped from the video visit, the video invite should be resent to: Text to cell phone: [phone]  Will anyone else be joining your video visit? No    Subjective     Dominic Workman is a 25 year old male who presents today via video visit for the following health issues:    HPI     Depression and Anxiety Follow-Up    How are you doing with your depression since your last visit? Worsened Feels medications are not as effective    How are you doing with your anxiety since your last visit?  No change    Are you having other symptoms that might be associated with depression or anxiety? No    Have you had a significant life event? No     Do you have any concerns with your use of alcohol or other drugs? No    Thinks that it is time for medication change.  Anxiety has been really bad.  Depression is relatively stable.  Had a good stretch from September 2019-July 2020 - " "hard been working at [employer], living with mother and then moved to new apartment with roommate.  Did an OP program for three months - very helpful.  COVID has worsened anxiety.  Not going to therapy, but has a new therapist and working on getting a schedule set up.  Does have low motivation and low pleasure.  Rumination over many different things.    Not working for the past couple months and hard to get going.  Had been a less than ideal work situation.  Looking for more delivery jobs.  Going to bed at 10 pm and waking at 8 pm routinely, eating healthy food.  Exercising very little.    Previous med trials  Prozac (fluoxetine) YES- caused migraines  Zoloft (sertraline) YES- worsened symptoms  Celexa (citalopram) YES- worked pretty well, had sexual side effects  Lexapro (escitalopram) no  Paxil (paroxetine) no  Wellbutrin (bupropion) YES  Effexor (venlafaxine) YES- current  Others:  no    Sinus and migraine - problems since childhood.  1.5-2 weeks ago flared up with weather change.  Pain is unilateral above eye brows.  Used to get a visual aura as a child, but much more rare now.  On average getting headaches 3-4 times a week (but clusters).  Sleep affects frequency and severity. Took depakote (?) as a child for preventing migraines.  Usually takes excedrin and sleeps for a couple hours and this is sufficient for treatment.    Always has \"a lot of nose stuff going on\" and had a lot of sinus infections as a kid.  Takes mucinex or steam treatment for congestion.  Does have allergies.  Not taking an antihistamine or nasal spray.  Has never seen ENT.     Social History     Tobacco Use     Smoking status: Never Smoker     Smokeless tobacco: Never Used   Substance Use Topics     Alcohol use: No     Drug use: Yes     PHQ 4/19/2019 5/30/2019 10/23/2020   PHQ-9 Total Score 4 15 5   Q9: Thoughts of better off dead/self-harm past 2 weeks Several days Several days Not at all   F/U: Thoughts of suicide or self-harm - - -   F/U: " Self harm-plan - - -   F/U: Self-harm action - - -   F/U: Safety concerns - - -     ZOILA-7 SCORE 4/19/2019 5/30/2019 10/23/2020   Total Score 4 9 8     Last PHQ-9 10/23/2020   1.  Little interest or pleasure in doing things 1   2.  Feeling down, depressed, or hopeless 1   3.  Trouble falling or staying asleep, or sleeping too much 1   4.  Feeling tired or having little energy 2   5.  Poor appetite or overeating 0   6.  Feeling bad about yourself 0   7.  Trouble concentrating 0   8.  Moving slowly or restless 0   Q9: Thoughts of better off dead/self-harm past 2 weeks 0   PHQ-9 Total Score 5   Difficulty at work, home, or with people Very difficult   In the past two weeks have you had thoughts of suicide or self harm? -   Do you have concerns about your personal safety or the safety of others? -   In the past 2 weeks have you thought about a plan or had intention to harm yourself? -   In the past 2 weeks have you acted on these thoughts in any way? -     ZOILA-7  10/23/2020   1. Feeling nervous, anxious, or on edge 1   2. Not being able to stop or control worrying 1   3. Worrying too much about different things 2   4. Trouble relaxing 1   5. Being so restless that it is hard to sit still 3   6. Becoming easily annoyed or irritable 0   7. Feeling afraid, as if something awful might happen 0   ZOILA-7 Total Score 8   If you checked any problems, how difficult have they made it for you to do your work, take care of things at home, or get along with other people? Very difficult       Suicide Assessment Five-step Evaluation and Treatment (SAFE-T)      How many servings of fruits and vegetables do you eat daily?  0-1    On average, how many sweetened beverages do you drink each day (Examples: soda, juice, sweet tea, etc.  Do NOT count diet or artificially sweetened beverages)?   1    How many days per week do you exercise enough to make your heart beat faster? 3 or less    How many minutes a day do you exercise enough to make  your heart beat faster? 30 - 60    How many days per week do you miss taking your medication? 0      Video Start Time: 3:27 PM    I have reviewed and updated the patient's Past Medical History, Social History, Family History and Medication List      Review of Systems   Constitutional, HEENT, cardiovascular, pulmonary, gi and gu systems are negative, except as otherwise noted.      Objective           Vitals:  No vitals were obtained today due to virtual visit.    Physical Exam     GENERAL: Healthy, alert and no distress  EYES: Eyes grossly normal to inspection.  No discharge or erythema, or obvious scleral/conjunctival abnormalities.  RESP: No audible wheeze, cough, or visible cyanosis.  No visible retractions or increased work of breathing.    SKIN: Visible skin clear. No significant rash, abnormal pigmentation or lesions.  NEURO: Cranial nerves grossly intact.  Mentation and speech appropriate for age.  PSYCH: Mentation appears normal, affect normal/bright, judgement and insight intact, normal speech and appearance well-groomed.          Assessment & Plan             Patient Instructions   Increase movement to doing some physical activity daily - start with 5000 steps every single day  Schedule with psychiatry clinic and them let me know when the appointment is and we can think if you should see the pharmacist in between      Return in about 1 month (around 11/23/2020) for mental health check.    ZARA Brand CNP  [facility] [place]      Video-Visit Details    Type of service:  Video Visit    Video End Time:4:05 PM    Originating Location (pt. Location): Home    Distant Location (provider location):  [facility]     Platform used for Video Visit: [email or web address]

## 2020-10-23 ENCOUNTER — VIRTUAL VISIT (OUTPATIENT)
Dept: FAMILY MEDICINE | Facility: CLINIC | Age: 25
End: 2020-10-23
Payer: COMMERCIAL

## 2020-10-23 DIAGNOSIS — J32.9 CHRONIC CONGESTION OF PARANASAL SINUS: ICD-10-CM

## 2020-10-23 DIAGNOSIS — G43.009 MIGRAINE WITHOUT AURA AND WITHOUT STATUS MIGRAINOSUS, NOT INTRACTABLE: ICD-10-CM

## 2020-10-23 DIAGNOSIS — F33.1 MAJOR DEPRESSIVE DISORDER, RECURRENT EPISODE, MODERATE (H): Primary | ICD-10-CM

## 2020-10-23 DIAGNOSIS — F41.1 GAD (GENERALIZED ANXIETY DISORDER): ICD-10-CM

## 2020-10-23 PROCEDURE — 99214 OFFICE O/P EST MOD 30 MIN: CPT | Mod: 95 | Performed by: NURSE PRACTITIONER

## 2020-10-23 ASSESSMENT — PATIENT HEALTH QUESTIONNAIRE - PHQ9
5. POOR APPETITE OR OVEREATING: SEVERAL DAYS
SUM OF ALL RESPONSES TO PHQ QUESTIONS 1-9: 5

## 2020-10-23 ASSESSMENT — ANXIETY QUESTIONNAIRES
6. BECOMING EASILY ANNOYED OR IRRITABLE: NOT AT ALL
5. BEING SO RESTLESS THAT IT IS HARD TO SIT STILL: NEARLY EVERY DAY
IF YOU CHECKED OFF ANY PROBLEMS ON THIS QUESTIONNAIRE, HOW DIFFICULT HAVE THESE PROBLEMS MADE IT FOR YOU TO DO YOUR WORK, TAKE CARE OF THINGS AT HOME, OR GET ALONG WITH OTHER PEOPLE: VERY DIFFICULT
GAD7 TOTAL SCORE: 8
7. FEELING AFRAID AS IF SOMETHING AWFUL MIGHT HAPPEN: NOT AT ALL
3. WORRYING TOO MUCH ABOUT DIFFERENT THINGS: MORE THAN HALF THE DAYS
2. NOT BEING ABLE TO STOP OR CONTROL WORRYING: SEVERAL DAYS
1. FEELING NERVOUS, ANXIOUS, OR ON EDGE: SEVERAL DAYS

## 2020-10-23 NOTE — PATIENT INSTRUCTIONS
Increase movement to doing some physical activity daily - start with 5000 steps every single day  Schedule with psychiatry clinic and them let me know when the appointment is and we can think if you should see the pharmacist in between    Take zyrtec daily.  Also add the nasal steroid fluticasone.  I usually find that adding a steroid nasal spray helps a lot with all symptoms.  One trick with using these is to only insert the nozzle slightly and then tip to point toward your eye rather than straight back.  Do not take a deep breath with administration.  These measures keep it from going back into your throat where it doesn't work, can irritate your throat and tastes awful.     Restart magnesium glycinate 600 mg nightly

## 2020-10-24 ASSESSMENT — ANXIETY QUESTIONNAIRES: GAD7 TOTAL SCORE: 8

## 2020-12-07 ENCOUNTER — PRE VISIT (OUTPATIENT)
Dept: NEUROLOGY | Facility: CLINIC | Age: 25
End: 2020-12-07

## 2020-12-07 NOTE — TELEPHONE ENCOUNTER
FUTURE VISIT INFORMATION      FUTURE VISIT INFORMATION:    Date: 12/8/2020    Time: 11am    Location: [facility]  REFERRAL INFORMATION:    Referring provider:  Self    Referring providers clinic:      Reason for visit/diagnosis  Headaches    RECORDS REQUESTED FROM:       Clinic name Comments Records Status Imaging Status   Internal JOSE ANGEL Maurer-10/23/2020 Epic N/A

## 2020-12-08 ENCOUNTER — VIRTUAL VISIT (OUTPATIENT)
Dept: NEUROLOGY | Facility: CLINIC | Age: 25
End: 2020-12-08

## 2020-12-08 ENCOUNTER — TELEPHONE (OUTPATIENT)
Dept: NEUROLOGY | Facility: CLINIC | Age: 25
End: 2020-12-08

## 2020-12-08 DIAGNOSIS — Z53.9 ERRONEOUS ENCOUNTER--DISREGARD: Primary | ICD-10-CM

## 2020-12-08 NOTE — LETTER
"12/8/2020       RE: Dominic Workman  [address] [place] [address]     Dear Colleague,    Thank you for referring your patient, Dominic Workman, to the [facility] [place] at [facility]. Please see a copy of my visit note below.    Dominic Workman is a 25 year old male who is being evaluated via a billable video visit.      The patient has been notified of following:     \"This video visit will be conducted via a call between you and your physician/provider. We have found that certain health care needs can be provided without the need for an in-person physical exam.  This service lets us provide the care you need with a video conversation.  If a prescription is necessary we can send it directly to your pharmacy.  If lab work is needed we can place an order for that and you can then stop by our lab to have the test done at a later time.    Video visits are billed at different rates depending on your insurance coverage.  Please reach out to your insurance provider with any questions.    If during the course of the call the physician/provider feels a video visit is not appropriate, you will not be charged for this service.\"    Patient has given verbal consent for Video visit? Yes  How would you like to obtain your AVS? MyChart  If you are dropped from the video visit, the video invite should be resent to: Send to e-mail at: [email or web address]  Will anyone else be joining your video visit? Sendy Nance    Video-Visit Details    Scheduled visit today at the Headache Clinic. Called and left a VM.   This encounter was opened in error. Please disregard.    Called to see if patient available to have his visit and left a VM.       Again, thank you for allowing me to participate in the care of your patient.      Sincerely,    ZARA Chirinos CNP      "

## 2020-12-08 NOTE — TELEPHONE ENCOUNTER
I tried calling Dominic but had to leave a vm.  Stephanie would be able to see him tomorrow for a video visit if he is available.  I asked for a call back.

## 2020-12-08 NOTE — TELEPHONE ENCOUNTER
[facility] Call Center    Phone Message    May a detailed message be left on voicemail: yes     Reason for Call: Other: Patient returning a call from clinic states that he recieved a call from clinic in regards to appointment and scheduling, but states he was disconnected. Writer does not see any notes. Patient states that he is still waiting in the waiting room    Please advise and call patient back to discuss further at your earliest convenience     Action Taken: Other: [facility]    Travel Screening: Not Applicable

## 2020-12-08 NOTE — TELEPHONE ENCOUNTER
[facility] Call Center    Phone Message    May a detailed message be left on voicemail: yes     Reason for Call: Other: Dominic returning call. He states he is waiting for Stephanie in the waiting room. Please call him back to discuss.      Action Taken: Message routed to:  [facility]     Travel Screening: Not Applicable

## 2020-12-08 NOTE — TELEPHONE ENCOUNTER
I wasn't able to connect with patient and called him but he did not answer his phone. I left a VM to call us back. I can see him later at 1:30 or need to reschedule to a different day.   Thank you

## 2020-12-08 NOTE — PROGRESS NOTES
"Dominic Workman is a 25 year old male who is being evaluated via a billable video visit.      The patient has been notified of following:     \"This video visit will be conducted via a call between you and your physician/provider. We have found that certain health care needs can be provided without the need for an in-person physical exam.  This service lets us provide the care you need with a video conversation.  If a prescription is necessary we can send it directly to your pharmacy.  If lab work is needed we can place an order for that and you can then stop by our lab to have the test done at a later time.    Video visits are billed at different rates depending on your insurance coverage.  Please reach out to your insurance provider with any questions.    If during the course of the call the physician/provider feels a video visit is not appropriate, you will not be charged for this service.\"    Patient has given verbal consent for Video visit? Yes  How would you like to obtain your AVS? MyChart  If you are dropped from the video visit, the video invite should be resent to: Send to e-mail at: [email or web address]  Will anyone else be joining your video visit? Sendy Nance    Video-Visit Details    Scheduled visit today at the Headache Clinic. Called and left a VM.   This encounter was opened in error. Please disregard.  "

## 2020-12-18 ENCOUNTER — VIRTUAL VISIT (OUTPATIENT)
Dept: NEUROLOGY | Facility: CLINIC | Age: 25
End: 2020-12-18
Payer: COMMERCIAL

## 2020-12-18 ENCOUNTER — TELEPHONE (OUTPATIENT)
Dept: NEUROLOGY | Facility: CLINIC | Age: 25
End: 2020-12-18

## 2020-12-18 DIAGNOSIS — G43.719 INTRACTABLE CHRONIC MIGRAINE WITHOUT AURA AND WITHOUT STATUS MIGRAINOSUS: Primary | ICD-10-CM

## 2020-12-18 PROCEDURE — 99204 OFFICE O/P NEW MOD 45 MIN: CPT | Mod: 95 | Performed by: NURSE PRACTITIONER

## 2020-12-18 RX ORDER — RIZATRIPTAN BENZOATE 5 MG/1
5-10 TABLET, ORALLY DISINTEGRATING ORAL
Qty: 18 TABLET | Refills: 6 | Status: SHIPPED | OUTPATIENT
Start: 2020-12-18

## 2020-12-18 NOTE — PATIENT INSTRUCTIONS
Plan:  Recommended a trial of migraine preventive treatment with Emgality. Side effects-allergic reaction or pain in the injection side or redness in the injection side. Unknown side effects with a long term use. Pregnancy is contraindicated.   Stay hydrated  Headache log -for duration, severity and frequency  Acute migraine headache treatment -a trial rizatriptan as instructed. Limit use to no more than 9 days per month. Side fatigue, drowsy, dizziness  Follow up in 2-3 months or sooner       Patient Education     Galcanezumab injection  Brand Name: Emgality  What is this medicine?  GALCANEZUMAB (gal ka EDMUND ue mab) is used to prevent migraines and treat cluster headaches.  How should I use this medicine?  This medicine is for injection under the skin. You will be taught how to prepare and give this medicine. Use exactly as directed. Take your medicine at regular intervals. Do not take your medicine more often than directed.  It is important that you put your used needles and syringes in a special sharps container. Do not put them in a trash can. If you do not have a sharps container, call your pharmacist or healthcare provider to get one.  Talk to your pediatrician regarding the use of this medicine in children. Special care may be needed.  What side effects may I notice from receiving this medicine?  Side effects that you should report to your doctor or health care professional as soon as possible:    allergic reactions like skin rash, itching or hives, swelling of the face, lips, or tongue  Side effects that usually do not require medical attention (report these to your doctor or health care professional if they continue or are bothersome):    pain, redness, or irritation at site where injected  What may interact with this medicine?  Interactions are not expected.  What if I miss a dose?  If you miss a dose, take it as soon as you can. If it is almost time for your next dose, take only that dose. Do not take  double or extra doses.  Where should I keep my medicine?  Keep out of the reach of children.    You will be instructed on how to store this medicine. Throw away any unused medicine after the expiration date on the label.  What should I tell my health care provider before I take this medicine?  They need to know if you have any of these conditions:    an unusual or allergic reaction to galcanezumab, other medicines, foods, dyes, or preservatives    pregnant or trying to get pregnant    breast-feeding  What should I watch for while using this medicine?  Tell your doctor or healthcare professional if your symptoms do not start to get better or if they get worse.  NOTE:This sheet is a summary. It may not cover all possible information. If you have questions about this medicine, talk to your doctor, pharmacist, or health care provider. Copyright  2020 [employer]

## 2020-12-18 NOTE — PROGRESS NOTES
"Dominic Workman is a 25 year old male who is being evaluated via a billable video visit.      The patient has been notified of following:     \"This video visit will be conducted via a call between you and your physician/provider. We have found that certain health care needs can be provided without the need for an in-person physical exam.  This service lets us provide the care you need with a video conversation.  If a prescription is necessary we can send it directly to your pharmacy.  If lab work is needed we can place an order for that and you can then stop by our lab to have the test done at a later time.    Video visits are billed at different rates depending on your insurance coverage.  Please reach out to your insurance provider with any questions.    If during the course of the call the physician/provider feels a video visit is not appropriate, you will not be charged for this service.\"    Patient has given verbal consent for Video visit?YES  How would you like to obtain your AVS? Mychart        Video-Visit Details    Type of service:  Video Visit    Video Start Time: 8:42 AM    Video End Time:9:27 AM       Originating Location (pt. Location): Home    Distant Location (provider location):  [facility] [place]     Platform used for Video Visit: SarahDNA13/ERICK Basilio    Provider's note:  Mother is present during today's visit    CC:  Headache . This visit was conducted via synchronous video visit due to the current COVID-19 crisis to reduce patient risk.  Verbal consent was obtained.     HPI:  Headache History  Onset headaches since childhood -abdominal pain and than headaches.   In the past 6 month -\"bad headache\" about once per week and \"bad migraine\" once per month +smaller headaches 1-4 times per week.   Severe migraine headache on one either side left or right eyebrow, a dull throb and intensifying pain and reports rarely visual symptoms -\"haze\" in the visual fields " "and more to the affected side and duration 1-2 minutes when pain is intense, very light sensitive and wants to be in the dark room, infrequent nausea.   Duration 2 days for sure when finally subsides. Takes Excedrin tension and usually works and caffeine and fells asleep.     \"other  headaches\" -more to the temple and side of the head and either side, duration from 1-2 hours to all day, and gradually intensifying and pushes thru than headaches get worse and more intense, associated with light sensitivity and wants to be in the dark and takes caffeine, tylenol   Frequency one per week    Back of the headache headaches milder and less intense and feels of tension, and can push thru but intensifying and becoming more severe.     Headache day free per month -about half per month -15 days out of 30 days.   Headache makes to function and calling in sick and lost job    Headache treatment:  Sumatriptan caused side effects and caused \"spacey feeling\",   Topiramate? -caused anorexia? Mother will try to find out more  Currently on venlafaxine has been for about a year and did not worsen headache or made it better  Has tried other antidepressants -prozac, celexa, zoloft, wellbutrin, amitriptyline-caused worsening of headaches some of them    Patient reports that sleep is not good and trying to improve and trying to keep sleep routine  No alcohol-triggers migraine   Caffeine -a cup of day  Staying hydrated and reports that he notice a difference  Anxiety not necessary under control and pursuing better treatment and has an appointment with a behavior health  Depression has been stable    No concussion and no head injuries    PMH:  Migraine headaches  Depression and anxiety  Poor sleep      SH:  Lives with a roommate, undergrad at [employer] but not currently and unemployed currently     No Known Allergies    Current Outpatient Medications   Medication     aspirin-acetaminophen-caffeine (EXCEDRIN MIGRAINE) 250-250-65 MG per tablet     " cholecalciferol (VITAMIN D3) 1000 UNIT tablet     venlafaxine (EFFEXOR-ER) 225 MG 24 hr tablet     No current facility-administered medications for this visit.      10 point ROS of systems including Constitutional, Eyes, Respiratory, Cardiovascular, Gastroenterology, Genitourinary, Integumentary, Muscularskeletal, Psychiatric were all negative except for pertinent positives noted in my HPI.    GENERAL: Healthy, alert and no distress, no headache today  EYES: Eyes grossly normal to inspection.  No discharge or erythema, or obvious scleral/conjunctival abnormalities.  RESP: No audible wheeze, cough, or visible cyanosis.  No visible retractions or increased work of breathing.    SKIN: Visible skin clear.   NEURO: Cranial nerves grossly intact.    PSYCH: Mentation appears normal, affect normal/bright, judgement and insight intact, normal speech and appearance well-groomed.    FH:  Biological mother has headaches    A/P:  Headaches for a long time and possible mixed etiology with chronic migraine headaches predominately.   Discussed a trial of one of the CGRPs     Plan:  Recommended a trial of migraine preventive treatment with Emgality. Side effects-allergic reaction or pain in the injection side or redness in the injection side. Unknown side effects with a long term use. Pregnancy is contraindicated.   Stay hydrated  Headache log -for duration, severity and frequency  Acute migraine headache treatment -a trial rizatriptan as instructed. Limit use to no more than 9 days per month. Side fatigue, drowsy, dizziness  Follow up in 2-3 months or sooner     I discussed all my recommendations with Dominic Workman who verbalizes understanding and comfortable with the plan.  All of patient's questions were answered from the best of my knowledge.  Patient is in agreement with the plan.     I spent a total of 53 minutes for telemedicine consult with the patient during today s virtual meeting with chart review, review of outside  records, counseling the patient and treatment plan      ZARA Chapa [facility]

## 2020-12-18 NOTE — TELEPHONE ENCOUNTER
Prior Authorization Retail Medication Request    Medication/Dose: galcanezumab-gnlm (EMGALITY) 120 MG/ML injection  ICD code (if different than what is on RX):  G43.009  Previously Tried and Failed:  See Chart  Rationale:  See Chart    Insurance Name:    Insurance ID:        Pharmacy Information   [facility]    [facility] [place] - [address] [place] Phone:  [phone]   Fax:  [phone]

## 2020-12-18 NOTE — TELEPHONE ENCOUNTER
Tried through [employer], they do not handle it.    Now submitting through [facility] SHONNA SNYDER Initiation    Medication: galcanezumab-gnlm (EMGALITY) 120 MG/ML injection  Insurance Company: [facility] - Phone [phone] Fax [phone]  Pharmacy Filling the Rx: [facility] [place] [place] - [address] [place]  Filling Pharmacy Phone: [phone]  Filling Pharmacy Fax:    Start Date: 12/18/2020

## 2020-12-18 NOTE — LETTER
"12/18/2020       RE: Dominic Workman  [address] [place] [address]     Dear Colleague,    Thank you for referring your patient, Dominic Workman, to the [facility] [place] at [facility]. Please see a copy of my visit note below.    Dominic Workman is a 25 year old male who is being evaluated via a billable video visit.      The patient has been notified of following:     \"This video visit will be conducted via a call between you and your physician/provider. We have found that certain health care needs can be provided without the need for an in-person physical exam.  This service lets us provide the care you need with a video conversation.  If a prescription is necessary we can send it directly to your pharmacy.  If lab work is needed we can place an order for that and you can then stop by our lab to have the test done at a later time.    Video visits are billed at different rates depending on your insurance coverage.  Please reach out to your insurance provider with any questions.    If during the course of the call the physician/provider feels a video visit is not appropriate, you will not be charged for this service.\"    Patient has given verbal consent for Video visit?YES  How would you like to obtain your AVS? Mychart        Video-Visit Details    Type of service:  Video Visit    Video Start Time: 8:42 AM    Video End Time:9:27 AM       Originating Location (pt. Location): Home    Distant Location (provider location):  [facility] [place]     Platform used for Video Visit: Karly/Madiha Carvalho, ERICK    Provider's note:  Mother is present during today's visit    CC:  Headache . This visit was conducted via synchronous video visit due to the current COVID-19 crisis to reduce patient risk.  Verbal consent was obtained.     HPI:  Headache History  Onset headaches since childhood -abdominal pain and than " "headaches.   In the past 6 month -\"bad headache\" about once per week and \"bad migraine\" once per month +smaller headaches 1-4 times per week.   Severe migraine headache on one either side left or right eyebrow, a dull throb and intensifying pain and reports rarely visual symptoms -\"haze\" in the visual fields and more to the affected side and duration 1-2 minutes when pain is intense, very light sensitive and wants to be in the dark room, infrequent nausea.   Duration 2 days for sure when finally subsides. Takes Excedrin tension and usually works and caffeine and fells asleep.     \"other  headaches\" -more to the temple and side of the head and either side, duration from 1-2 hours to all day, and gradually intensifying and pushes thru than headaches get worse and more intense, associated with light sensitivity and wants to be in the dark and takes caffeine, tylenol   Frequency one per week    Back of the headache headaches milder and less intense and feels of tension, and can push thru but intensifying and becoming more severe.     Headache day free per month -about half per month -15 days out of 30 days.   Headache makes to function and calling in sick and lost job    Headache treatment:  Sumatriptan caused side effects and caused \"spacey feeling\",   Topiramate? -caused anorexia? Mother will try to find out more  Currently on venlafaxine has been for about a year and did not worsen headache or made it better  Has tried other antidepressants -prozac, celexa, zoloft, wellbutrin, amitriptyline-caused worsening of headaches some of them    Patient reports that sleep is not good and trying to improve and trying to keep sleep routine  No alcohol-triggers migraine   Caffeine -a cup of day  Staying hydrated and reports that he notice a difference  Anxiety not necessary under control and pursuing better treatment and has an appointment with a behavior health  Depression has been stable    No concussion and no head " injuries    PMH:  Migraine headaches  Depression and anxiety  Poor sleep      SH:  Lives with a roommate, undergrad at [employer] but not currently and unemployed currently     No Known Allergies    Current Outpatient Medications   Medication     aspirin-acetaminophen-caffeine (EXCEDRIN MIGRAINE) 250-250-65 MG per tablet     cholecalciferol (VITAMIN D3) 1000 UNIT tablet     venlafaxine (EFFEXOR-ER) 225 MG 24 hr tablet     No current facility-administered medications for this visit.      10 point ROS of systems including Constitutional, Eyes, Respiratory, Cardiovascular, Gastroenterology, Genitourinary, Integumentary, Muscularskeletal, Psychiatric were all negative except for pertinent positives noted in my HPI.    GENERAL: Healthy, alert and no distress, no headache today  EYES: Eyes grossly normal to inspection.  No discharge or erythema, or obvious scleral/conjunctival abnormalities.  RESP: No audible wheeze, cough, or visible cyanosis.  No visible retractions or increased work of breathing.    SKIN: Visible skin clear.   NEURO: Cranial nerves grossly intact.    PSYCH: Mentation appears normal, affect normal/bright, judgement and insight intact, normal speech and appearance well-groomed.    FH:  Biological mother has headaches    A/P:  Headaches for a long time and possible mixed etiology with chronic migraine headaches predominately.   Discussed a trial of one of the CGRPs     Plan:  Recommended a trial of migraine preventive treatment with Emgality. Side effects-allergic reaction or pain in the injection side or redness in the injection side. Unknown side effects with a long term use. Pregnancy is contraindicated.   Stay hydrated  Headache log -for duration, severity and frequency  Acute migraine headache treatment -a trial rizatriptan as instructed. Limit use to no more than 9 days per month. Side fatigue, drowsy, dizziness  Follow up in 2-3 months or sooner     I discussed all my recommendations with Dominic CLEANING  Ji who verbalizes understanding and comfortable with the plan.  All of patient's questions were answered from the best of my knowledge.  Patient is in agreement with the plan.     I spent a total of 53 minutes for telemedicine consult with the patient during today s virtual meeting with chart review, review of outside records, counseling the patient and treatment plan      ZARA Chaap [facility]

## 2020-12-21 ENCOUNTER — TELEPHONE (OUTPATIENT)
Dept: NEUROLOGY | Facility: CLINIC | Age: 25
End: 2020-12-21

## 2020-12-21 NOTE — TELEPHONE ENCOUNTER
"Prior Authorization Retail Medication Request    Medication/Dose: emgality 240 mg loading dose then 120 mg every 28 days  ICD code (if different than what is on RX):  Chronic migraine  Previously Tried and Failed:  Sumatriptan caused side effects and caused \"spacey feeling\",   Topiramate? -caused anorexia? Mother will try to find out more  Currently on venlafaxine has been for about a year and did not worsen headache or made it better  Has tried other antidepressants -prozac, celexa, zoloft, wellbutrin, amitriptyline-caused worsening of headaches some of them    Rationale:    Headache day free per month -about half per month -15 days out of 30 days.   Headache makes to function and calling in sick and lost job.  Bad migraine headache\" on one either side left or right eyebrow, a dull throb and intensifying pain and reports rarely visual symptoms -\"haze\" in the visual fields and more to the affected side and duration 1-2 minutes when pain is intense, very light sensitive and wants to be in the dark room     Insurance Name:  [facility]  Insurance ID:  [id number]       Pharmacy Information (if different than what is on RX)  Name:    Phone:    "

## 2020-12-21 NOTE — TELEPHONE ENCOUNTER
Prior Authorization Not Needed per Insurance    Medication: galcanezumab-gnlm (EMGALITY) 120 MG/ML injection-PA NOT NEEDED   Insurance Company: [employer] [place] - Phone [phone] Fax [phone]  Expected CoPay: $30    Pharmacy Filling the Rx: [facility] [place] - [address] [facility] [facility]  Pharmacy Notified: Yes  Patient Notified: No    Called pharmacy and pharmacy stated that PA is Not Needed and medication is covered. Pharmacy stated that they have a paid claim on medication and has notify patient that medication is ready for . Insurance also stated that PA is Not Needed and medication is covered.

## 2020-12-27 ENCOUNTER — HEALTH MAINTENANCE LETTER (OUTPATIENT)
Age: 25
End: 2020-12-27

## 2021-03-01 ENCOUNTER — HOSPITAL ENCOUNTER (OUTPATIENT)
Dept: BEHAVIORAL HEALTH | Facility: CLINIC | Age: 26
Discharge: HOME | End: 2021-03-01
Attending: FAMILY MEDICINE | Admitting: FAMILY MEDICINE
Payer: COMMERCIAL

## 2021-03-01 PROCEDURE — 90791 PSYCH DIAGNOSTIC EVALUATION: CPT | Mod: 95 | Performed by: COUNSELOR

## 2021-03-01 NOTE — PATIENT INSTRUCTIONS
Dominic,  It was a pleasure meeting with you today. We have you on our wait list for the Adult Day Treatment (ADT) program. Sarai Leal or I will be calling you when we have an open slot (preferable in the afternoon) in one of our tracks for ADT. Both of our contact information is below if you have any questions. I also provided some resources below.     Sarai Leal, ADT   [phone]     Resources:  [facility] [employer] Application Help  Website:https://www.[facility]/help/find-assister/find-assister.jsp  Telephone: [phone] ([phone] outside the [facility])  Regular hours Monday-Friday: 8 a.m. to 4 p.m.    Financial Counseling through [facility]:  Behavioral Financial Counseling at [phone] or general [facility] Financial Counseling at [phone].  Financial counselors (this is general) can answer questions about possible costs and coverage. Discuss with them any options you may have if you don't have enough or have no insurance for your care.     Ashlee Floyd, [facility], [facility]  Licensed Psychotherapist [facility]  Recovery Services [email or web address]  [email or web address]  Office: [phone] Fax: [phone]  Gender pronouns: she/her/hers

## 2021-03-01 NOTE — PROGRESS NOTES
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 Lashonda (mother) Phone: [phone]   Name:  Phone:     Step 6: Making the environment safe:       be around others    Step 7: Professionals or agencies I can contact during a crisis:      Suicide Prevention Lifeline: 5-429-759-NVBF (2586)    Crisis Text Line Service (available 24 hours a day, 7 days a week): Text [place] to 603527    Call  **CRISIS (937194) from a cell phone to talk to a team of professionals who can help you.    Crisis Services By [facility]: Phone Number:   [place]     [phone]   [place]    [phone]   [place]    [phone]   [place]    [phone]   [place]    [phone]   [place] [phone]   [place]     [phone]       Call 911 or go to my nearest emergency department.     I helped develop this safety plan and agree to use it when needed.  I have been given a copy of this plan.        Today s date:  3/1/2021  Adapted from Safety Plan Template 2008 Leida Simon and Dex Holt is reprinted with the express permission of the authors.  No portion of the Safety Plan Template may be reproduced without the express, written permission.  You can contact the authors at [email or web address] or [email or web address]              "

## 2021-03-02 ENCOUNTER — TELEPHONE (OUTPATIENT)
Dept: BEHAVIORAL HEALTH | Facility: CLINIC | Age: 26
End: 2021-03-02

## 2021-03-02 NOTE — TELEPHONE ENCOUNTER
Writer called pt today to discuss a start date for the ADT program.  Pt did not answer and so a vm message was left requesting a return phone call.

## 2021-03-04 ENCOUNTER — TELEPHONE (OUTPATIENT)
Dept: BEHAVIORAL HEALTH | Facility: CLINIC | Age: 26
End: 2021-03-04

## 2021-03-04 NOTE — TELEPHONE ENCOUNTER
Writer called Pt to coordinate a start date.  He has not responded to a vm left two days ago.  Left another vm message requesting a return phone call.

## 2021-03-10 ENCOUNTER — TELEPHONE (OUTPATIENT)
Dept: BEHAVIORAL HEALTH | Facility: CLINIC | Age: 26
End: 2021-03-10

## 2021-03-11 NOTE — TELEPHONE ENCOUNTER
Writer called Pt today to offer a start date for the ADT program.  He answered and stated he is interested.  He agreed to start on Monday in the ADT3B track.  Explained someone would be calling him on Friday to complete the admission.  He had no other questions at this time.  Will inform the team.

## 2021-03-12 ENCOUNTER — TELEPHONE (OUTPATIENT)
Dept: BEHAVIORAL HEALTH | Facility: CLINIC | Age: 26
End: 2021-03-12

## 2021-03-12 ASSESSMENT — PATIENT HEALTH QUESTIONNAIRE - PHQ9
5. POOR APPETITE OR OVEREATING: SEVERAL DAYS
SUM OF ALL RESPONSES TO PHQ QUESTIONS 1-9: 13

## 2021-03-12 ASSESSMENT — ANXIETY QUESTIONNAIRES
IF YOU CHECKED OFF ANY PROBLEMS ON THIS QUESTIONNAIRE, HOW DIFFICULT HAVE THESE PROBLEMS MADE IT FOR YOU TO DO YOUR WORK, TAKE CARE OF THINGS AT HOME, OR GET ALONG WITH OTHER PEOPLE: VERY DIFFICULT
1. FEELING NERVOUS, ANXIOUS, OR ON EDGE: NEARLY EVERY DAY
2. NOT BEING ABLE TO STOP OR CONTROL WORRYING: MORE THAN HALF THE DAYS
5. BEING SO RESTLESS THAT IT IS HARD TO SIT STILL: NEARLY EVERY DAY
GAD7 TOTAL SCORE: 12
3. WORRYING TOO MUCH ABOUT DIFFERENT THINGS: MORE THAN HALF THE DAYS
7. FEELING AFRAID AS IF SOMETHING AWFUL MIGHT HAPPEN: NOT AT ALL
6. BECOMING EASILY ANNOYED OR IRRITABLE: SEVERAL DAYS

## 2021-03-12 NOTE — TELEPHONE ENCOUNTER
left voice mail #1 asking  patient to call back to complete an admission screen before starting the Day Treatment program next week.

## 2021-03-12 NOTE — TELEPHONE ENCOUNTER
Admission Date: 3/15/2021    Identify any current concerns with potential impact to admission:     medication/medical concerns: No     immediate safety concerns:No    Does patient have safety plan? Yes  Note: Please copy safety plan copied into BEH Encounter     Other (insurance/childcare/transportation/housing/planned absences/etc): No issues or concerns    Patient's insurance is: [facility] .     Does patient need appointment with provider? No    Review patient's program schedule and inform them of any variation due to late days or holidays.                                                                                  Patient is ready to begin the Day Treatment program this coming Monday (3/15/21)      Completed by: Joel HINOJOSA/MANUELA

## 2021-03-13 ASSESSMENT — ANXIETY QUESTIONNAIRES: GAD7 TOTAL SCORE: 12

## 2021-03-15 ENCOUNTER — TELEPHONE (OUTPATIENT)
Dept: BEHAVIORAL HEALTH | Facility: CLINIC | Age: 26
End: 2021-03-15

## 2021-03-15 NOTE — TELEPHONE ENCOUNTER
Maer called Pt today since he was scheduled to start in the program, but did not attend.  Maer only able to leave a vm message requesting a return phone call regarding start and intent for the program.

## 2021-03-16 ENCOUNTER — TELEPHONE (OUTPATIENT)
Dept: BEHAVIORAL HEALTH | Facility: CLINIC | Age: 26
End: 2021-03-16

## 2021-03-16 NOTE — TELEPHONE ENCOUNTER
Writer called Pt today to try to connect about the ADT program since he did not attend his first day yesterday, and has not returned the phone call.  Requested he call back by noon tomorrow, 3/17 regarding his interest and ability to participate in the program.

## 2021-03-17 ENCOUNTER — TELEPHONE (OUTPATIENT)
Dept: BEHAVIORAL HEALTH | Facility: CLINIC | Age: 26
End: 2021-03-17

## 2021-03-17 NOTE — TELEPHONE ENCOUNTER
Pt called and apologized for not starting as planned earlier in the week.  He stated he is having an increase in anxiety and sleep difficulty.  He plans to start in the program tomorrow, 3/18.  Team informed.

## 2021-03-18 ENCOUNTER — HOSPITAL ENCOUNTER (OUTPATIENT)
Dept: BEHAVIORAL HEALTH | Facility: CLINIC | Age: 26
End: 2021-03-18
Attending: PSYCHIATRY & NEUROLOGY
Payer: COMMERCIAL

## 2021-03-18 PROCEDURE — G0177 OPPS/PHP; TRAIN & EDUC SERV: HCPCS | Mod: GT

## 2021-03-18 PROCEDURE — 90853 GROUP PSYCHOTHERAPY: CPT | Mod: GT | Performed by: PSYCHOLOGIST

## 2021-03-18 PROCEDURE — 90853 GROUP PSYCHOTHERAPY: CPT | Mod: GT | Performed by: COUNSELOR

## 2021-03-18 NOTE — GROUP NOTE
Psychotherapy Group Note  Telemedicine Visit: The patient's condition can be safely assessed and treated via synchronous audio and visual telemedicine encounter.    Reason for Telemedicine Visit: Patient has requested telehealth visit  Originating Site (Patient Location): Patient's home  Distant Site (Provider Location): [facility] Outpatient Setting: Adult Day Tx  Consent:  The patient/guardian has verbally consented to: the potential risks and benefits of telemedicine (video visit) versus in person care; bill my insurance or make self-payment for services provided; and responsibility for payment of non-covered services.   Mode of Communication:  Video Conference via [employer]  As the provider I attest to compliance with applicable laws and regulations related to telemedicine.    PATIENT'S NAME: Dominic Workman  MRN:   [id number]  :   1995  ACCT. NUMBER: [id number]  DATE OF SERVICE: 3/18/21  START TIME:  2:00 PM  END TIME:  2:50 PM  FACILITATOR: Gita Fairbanks PsyD  TOPIC:  EBP Group: Self-Awareness  [facility] Adult Mental Health Day Treatment  TRACK: 3B    NUMBER OF PARTICIPANTS: 7    Summary of Group / Topics Discussed:  Self-Awareness: Self-Compassion: Patients received overview of key concepts in developing self-compassion. Patients discussed mindfulness, self-kindness, and finding common humanity. Patients identified their current approach to problems in their lives and learned skills for increasing self-compassion. Patients identified ways they can put self-compassion skills into practice and problem solve barriers to application of skills.     Patient Session Goals / Objectives:    [place] components of self-compassion    Identify ways to practice self-compassion in daily life    Problem solve barriers to self-compassion practice      Patient Participation / Response:  Fully participated with the group by sharing personal reflections / insights and openly received / provided  feedback with other participants.    Demonstrated understanding of values, strengths, and challenges to learn about themselves    Treatment Plan:  Patient has a current master individualized treatment plan.  See Epic treatment plan for more information.    Crystal Kline Psy., D,  Licensed Clinical Psychologist

## 2021-03-18 NOTE — GROUP NOTE
Process Group Note    PATIENT'S NAME: Dominic Workman  MRN:   [id number]  :   1995  ACCT. NUMBER: [id number]  DATE OF SERVICE: 3/18/21  START TIME:  1:00 PM  END TIME:  1:50 PM  FACILITATOR: Estefani Colon LPCC  TOPIC:  Process Group    Diagnoses:  296.32 (F33.1) Major Depressive Disorder, Recurrent Episode, Moderate _  300.02 (F41.1) Generalized Anxiety Disorder.  4. Other Diagnoses that is relevant to services:   5. 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  6. Prognosis: Return to Normal Functioning, Expect Improvement and Relieve Acute Symptoms.    [facility] Day Treatment  TRACK: 3B    NUMBER OF PARTICIPANTS: 7    Telemedicine Visit: The patient's condition can be safely assessed and treated via synchronous audio and visual telemedicine encounter.      Reason for Telemedicine Visit: Services only offered telehealth and due to COVID-19    Originating Site (Patient Location): Patient's home    Distant Site (Provider Location): Provider Remote Setting    Consent:  The patient/guardian has verbally consented to: the potential risks and benefits of telemedicine (video visit) versus in person care; bill my insurance or make self-payment for services provided; and responsibility for payment of non-covered services.     Mode of Communication:  Video Conference via Zoom    As the provider I attest to compliance with applicable laws and regulations related to telemedicine.            Data:    Session content: At the start of this group, patients were invited to check in by identifying themselves, describing their current emotional status, and identifying issues to address in this group.   Area(s) of treatment focus addressed in this session included Symptom Management, Personal Safety and  Community Resources/Discharge Planning.    Patient reported feeling good today. Patient discussed working toward coming to group. Patient identified follow through as skills they will use to address their goal(s). Patient reported not wanting to come may be a barrier to working toward their goal(s) and/or addressing mental health symptoms. Patient reported no safety concerns and/or self-injurious behaviors. Patient reported yes substance use. Patient reported they are taking their medications as prescribed. Patient reported feeling proud that they came to group. Patient discussed his first day with the treatment group.     Therapeutic Interventions/Treatment Strategies:  Psychotherapist offered support, feedback and validation and reinforced use of skills. Treatment modalities used include Motivational Interviewing and Cognitive Behavioral Therapy. Interventions include Coping Skills: Assisted patient in identifying 1-2 healthy distraction skills to reduce overall distress, Symptoms Management: Promoted understanding of their diagnoses and how it impacts their functioning and Emotions Management:  Discussed barriers to emotional regulation.    Assessment:    Patient response:   Patient responded to session by accepting feedback, giving feedback, listening, focusing on goals, being attentive and accepting support    Possible barriers to participation / learning include: and no barriers identified    Health Issues:   None reported       Substance Use Review:   Substance Use: No active concerns identified.    Mental Status/Behavioral Observations  Appearance:   Appropriate   Eye Contact:   Good   Psychomotor Behavior: Normal   Attitude:   Cooperative   Orientation:   All  Speech   Rate / Production: Normal/ Responsive Normal    Volume:  Normal   Mood:    Anxious  Depressed  Normal  Affect:    Appropriate   Thought Content:   Clear and Safety denies any current safety concerns including suicidal ideation, self-harm, and  homicidal ideation  Thought Form:  Coherent  Logical     Insight:    Good     Plan:     Safety Plan: No current safety concerns identified.  Recommended that patient call 911 or go to the local ED should there be a change in any of these risk factors.     Barriers to treatment: None identified    Patient Contracts (see media tab):  None    Substance Use: Provided encouragement towards sobriety     Continue or Discharge: Patient will continue in Adult Day Treatment (ADT)  as planned. Patient is likely to benefit from learning and using skills as they work toward the goals identified in their treatment plan.      Estefani Colon, [facility]  March 18, 2021

## 2021-03-19 ENCOUNTER — TELEPHONE (OUTPATIENT)
Dept: BEHAVIORAL HEALTH | Facility: CLINIC | Age: 26
End: 2021-03-19

## 2021-03-19 NOTE — TELEPHONE ENCOUNTER
Writer called to conduct RN health screen. Pt unable to talk now; writer will call Monday morning.  Christy Goldstein RN on 3/19/2021 at 11:44 AM

## 2021-03-22 ENCOUNTER — TELEPHONE (OUTPATIENT)
Dept: BEHAVIORAL HEALTH | Facility: CLINIC | Age: 26
End: 2021-03-22

## 2021-03-24 ENCOUNTER — HOSPITAL ENCOUNTER (OUTPATIENT)
Dept: BEHAVIORAL HEALTH | Facility: CLINIC | Age: 26
End: 2021-03-24
Attending: PSYCHIATRY & NEUROLOGY
Payer: COMMERCIAL

## 2021-03-24 PROCEDURE — G0177 OPPS/PHP; TRAIN & EDUC SERV: HCPCS | Mod: GT

## 2021-03-24 PROCEDURE — 90853 GROUP PSYCHOTHERAPY: CPT | Mod: GT

## 2021-03-24 NOTE — GROUP NOTE
Psychoeducation Group Note    PATIENT'S NAME: Dominic Workman  MRN:   [id number]  :   1995  ACCT. NUMBER: [id number]  DATE OF SERVICE: 3/24/21  START TIME:  3:00 PM  END TIME:  3:50 PM  FACILITATOR: Barron Miranda OT  TOPIC: MH Life Skills Group: Sensory Approaches in Mental Health  [facility] Adult Mental Health Day Treatment  TRACK: 3B    Telemedicine Visit: The patient's condition can be safely assessed and treated via synchronous audio and visual telemedicine encounter.      Reason for Telemedicine Visit: Services only offered telehealth and Covid 19    Originating Site (Patient Location): Patient's home    Distant Site (Provider Location): [facility]: [facility], Outpatient Mental Health and Addiction Services    Consent:  The patient/guardian has verbally consented to: the potential risks and benefits of telemedicine (video visit) versus in person care; bill my insurance or make self-payment for services provided; and responsibility for payment of non-covered services.     Mode of Communication:  Video Conference via [employer] Z[employer]    As the provider I attest to compliance with applicable laws and regulations related to telemedicine.    NUMBER OF PARTICIPANTS: 7    Summary of Group / Topics Discussed:  Sensory Approaches in Mental Health:  Sensory Enhanced Mindfulness: Patients were taught and provided with an opportunity to explore and practice how using sensory enhanced mindfulness practices can help them stay grounded in the present moment as a way to manage mental health symptoms and stressors.         Patient Session Goals / Objectives:    Identified how using sensory enhanced mindfulness practices can be used for grounding, stress management, and self regulation      Improved awareness of different types of sensory enhanced mindfulness activities that assist with healthy coping of stress and symptoms      Established a plan for practice of these skills  in their own environments    Practiced and reflected on how to generalize taught skills to their everyday life        Patient Participation / Response:  Fully participated with the group by sharing personal reflections / insights and openly received / provided feedback with other participants.    Verbalized understanding of content and Patient would benefit from additional opportunities to practice the content to be able to generalize it to their everyday life with increased intentionality, consistency, and efficacy in support of their psychiatric recovery    Treatment Plan:  Patient has a current master individualized treatment plan.  See Epic treatment plan for more information.    Barron Miranda, OT

## 2021-03-24 NOTE — GROUP NOTE
Psychoeducation Group Note    PATIENT'S NAME: Dominic Workman  MRN:   [id number]  :   1995  ACCT. NUMBER: [id number]  DATE OF SERVICE: 3/24/21  START TIME:  2:00 PM  END TIME:  2:50 PM  FACILITATOR: Reginaldo Putnam OTR/L  TOPIC: MH Life Skills Group: Resiliency Development  Telemedicine Visit: The patient's condition can be safely assessed and treated via synchronous audio and visual telemedicine encounter.      Reason for Telemedicine Visit: COVID-19    Originating Site (Patient Location): Patient's home    Distant Site (Provider Location): [facility]: [facility]    Consent:  The patient/guardian has verbally consented to: the potential risks and benefits of telemedicine (video visit) versus in person care; bill my insurance or make self-payment for services provided; and responsibility for payment of non-covered services.     Mode of Communication:  Video Conference via [employer]    As the provider I attest to compliance with applicable laws and regulations related to telemedicine.   [facility] Adult Mental Health Day Treatment  TRACK: 3B    NUMBER OF PARTICIPANTS: 7    Summary of Group / Topics Discussed:  Resiliency Development:  Self Awareness and Self-Confidence: Patients explored and identified their strengths, emotions, barriers, skills, and behavior patterns that occur when changes happen in life.  Focus was on recognizing these aspects and developing ways to help support moving forward, making changes as needed to support resiliency.      Patient Session Goals / Objectives:    Identified emotions, barriers, skills, strengths, and behavior patterns that occur when changes happen in life and how to effectively cope     Improved awareness of the process of change and skills and strategies that support this       Established a plan for practice of these skills in their own environments    Practiced and reflected on how to generalize taught skills to their everyday  life        Patient Participation / Response:  Fully participated with the group by sharing personal reflections / insights and openly received / provided feedback with other participants.    Demonstrated understanding of content through handout/group discussion , Verbalized understanding of content and Patient would benefit from additional opportunities to practice the content to be able to generalize it to their everyday life with increased intentionality, consistency, and efficacy in support of their psychiatric recovery    Treatment Plan:  Patient has a current master individualized treatment plan.  See Epic treatment plan for more information.    Reginaldo Putnam, OTR/L

## 2021-03-25 NOTE — GROUP NOTE
Process Group Note    PATIENT'S NAME: Dominic Workman  MRN:   [id number]  :   1995  ACCT. NUMBER: [id number]  DATE OF SERVICE: 3/24/21  START TIME:  1:00 PM  END TIME:  1:50 PM  FACILITATOR: Madison Al LICSW  TOPIC:  Process Group    Diagnoses:  296.32 (F33.1) Major Depressive Disorder, Recurrent Episode, Moderate _  300.02 (F41.1) Generalized Anxiety Disorder.  4. Other Diagnoses that is relevant to services:   5. 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  6. Prognosis: Return to Normal Functioning, Expect Improvement and Relieve Acute Symptoms.         [facility] Adult Mental Health Day Treatment  TRACK: 3B    Telemedicine Visit: The patient's condition can be safely assessed and treated via synchronous audio and visual telemedicine encounter.      Reason for Telemedicine Visit: Services only offered telehealth    Originating Site (Patient Location): Patient's home    Distant Site (Provider Location): [facility]: [facility]    Consent:  The patient/guardian has verbally consented to: the potential risks and benefits of telemedicine (video visit) versus in person care; bill my insurance or make self-payment for services provided; and responsibility for payment of non-covered services.     Mode of Communication:  Video Conference via zoom    As the provider I attest to compliance with applicable laws and regulations related to telemedicine.       NUMBER OF PARTICIPANTS: 7          Data:    Session content: At the start of this group, patients were invited to check in by identifying themselves, describing their current emotional status, and identifying issues to address in this group.   Area(s) of treatment focus addressed in this session included Symptom Management and Personal Safety.  Pt  "reports a struggle with anxiety and depression. He feels comforted by his cats, but is sad, because his roommate has decided not to live with him anymore. Pt feels stressed by need to find an apt. He feels proud that he has made it to group today and sets a goal to \"get into day treatment mode\". He reports ongoing cannabis use, but no drinking. Denies safety concerns.    Therapeutic Interventions/Treatment Strategies:  Psychotherapist offered support, feedback and validation and reinforced use of skills.    Assessment:    Patient response:   Patient responded to session by accepting feedback, giving feedback, listening, focusing on goals, being attentive and accepting support    Possible barriers to participation / learning include: and no barriers identified    Health Issues:   None reported       Substance Use Review:   Substance Use: cannabis .     Mental Status/Behavioral Observations  Appearance:   Appropriate   Eye Contact:   Good   Psychomotor Behavior: Normal   Attitude:   Cooperative   Orientation:   All  Speech   Rate / Production: Normal    Volume:  Normal   Mood:    Anxious  Depressed   Affect:    Subdued   Thought Content:   Clear  Thought Form:  Coherent  Logical     Insight:    Fair     Plan:     Safety Plan: No current safety concerns identified.  Recommended that patient call 911 or go to the local ED should there be a change in any of these risk factors.     Barriers to treatment: None identified    Patient Contracts (see media tab):  None    Substance Use: Not addressed in session     Continue or Discharge: Patient will continue in Adult Day Treatment (ADT)  as planned. Patient is likely to benefit from learning and using skills as they work toward the goals identified in their treatment plan.      Madison Martin, [facility] Center[facility]  March 25, 2021  "

## 2021-03-26 ENCOUNTER — TELEPHONE (OUTPATIENT)
Dept: BEHAVIORAL HEALTH | Facility: CLINIC | Age: 26
End: 2021-03-26

## 2021-03-26 NOTE — TELEPHONE ENCOUNTER
Writer called to conduct RN health screen. Left VM requesting call back. Will reattempt.  Christy Goldstein RN on 3/26/2021 at 3:29 PM

## 2021-03-29 ENCOUNTER — TELEPHONE (OUTPATIENT)
Dept: BEHAVIORAL HEALTH | Facility: CLINIC | Age: 26
End: 2021-03-29

## 2021-03-31 ENCOUNTER — TELEPHONE (OUTPATIENT)
Dept: BEHAVIORAL HEALTH | Facility: CLINIC | Age: 26
End: 2021-03-31

## 2021-03-31 NOTE — TELEPHONE ENCOUNTER
"RN Review of Medical History / Physical Health Screen  Outpatient Mental Health Programs - [facility] Adult Mental Health Day Treatment    PATIENT'S NAME: Dominic Workman  MRN:   [id number]  :   1995  ACCT. NUMBER: [id number]  CURRENT AGE:  26 year old    DATE OF DIAGNOSTIC ASSESSMENT: 3/1/21  DATE OF ADMISSION: 3/18/21     Please see Diagnostic Assessment for additional Medical History.     General Health:   Have you had any exposure to any communicable disease in the past 2-3 weeks? no     Are you aware of safe sex practices? yes       Nutrition:    Are you on a special diet? If yes, please explain:  no   Do you have any concerns regarding your nutritional status? If yes, please explain:  no wants to eat more produce   Have you had any appetite changes in the last 3 months?  No     Have you had any weight loss or weight gain in the last 3 months?  No     Do you have a history of an eating disorder? no   Do you have a history of being in an eating disorder program? no     Patient height and weight recorded by RN in epic flowsheet: no No; Unable to measure  Because of temporary in-person programmatic suspension due to COVID-19 pandemic, all pt weights and heights will be collected through patient self-report an recorded in physical health screening progress note upon admission to the program.                            Height/Weight Review:  Patient reported height:  5'11\"      Patient reports weight:  Date last checked:  Unknown   Any referrals/needs identified?                BMI Review:  Was the patient informed of BMI? no      Findings See above         Fall Risk:   Have you had any falls in the past 3 months? no     Do you currently use any assistive devices for mobility?   no      Additional Comments/Assessment: Pt denies seizures (current/historical), dizziness, mobility concerns. No fall risk assessed; No safety concerns r/t falls.  Wants referral to [facility]  His insurance ends today. He " is going to be searching [place] [employer] for insurance plans and hopes to find one that will allow him to start tomorrow. He may have a lapse, which writer mentioned to him and requested that he let us know what happens.    Per completion of the Medical History / Physical Health Screen, is there a recommendation to see / follow up with a primary care physician/clinic or dentist?    No.      Christy Goldstein RN  3/31/2021

## 2021-04-24 ENCOUNTER — HEALTH MAINTENANCE LETTER (OUTPATIENT)
Age: 26
End: 2021-04-24

## 2021-05-07 DIAGNOSIS — F33.1 MAJOR DEPRESSIVE DISORDER, RECURRENT EPISODE, MODERATE (H): ICD-10-CM

## 2021-05-10 RX ORDER — VENLAFAXINE HYDROCHLORIDE 225 MG/1
TABLET, EXTENDED RELEASE ORAL
Qty: 30 TABLET | Refills: 0 | Status: SHIPPED | OUTPATIENT
Start: 2021-05-10 | End: 2021-06-10

## 2021-05-10 NOTE — TELEPHONE ENCOUNTER
"Stephy and NONA,    Called pt to help him get scheduled for f/u appt with Stephy, pt reports that he does not currently have health insurance and will have to call back to make an appt as soon as he can.    Pt stated he is completely out of this medication, is starting to feel withdrawal.    Requested Prescriptions   Pending Prescriptions Disp Refills     venlafaxine (EFFEXOR-ER) 225 MG 24 hr tablet [Pharmacy Med Name: venlafaxine  mg tablet,extended release 24 hr (EFFEXOR XR)] 90 tablet 0     Sig: Take 1 tablet (225 mg) by mouth once daily.       Serotonin-Norepinephrine Reuptake Inhibitors  Failed - 5/7/2021 12:39 PM        Failed - Blood pressure under 140/90 in past 12 months     BP Readings from Last 3 Encounters:   04/19/19 126/82   01/11/19 138/84   09/25/18 126/76                 Failed - PHQ-9 score of less than 5 in past 6 months     Please review last PHQ-9 score.           Failed - Normal serum creatinine on file in past 12 months     No lab results found.    Ok to refill medication if creatinine is low          Failed - Recent (6 mo) or future (30 days) visit within the authorizing provider's specialty     Patient had office visit in the last 6 months or has a visit in the next 30 days with authorizing provider or within the authorizing provider's specialty.  See \"Patient Info\" tab in inbasket, or \"Choose Columns\" in Meds & Orders section of the refill encounter.            Passed - Medication is active on med list        Passed - Patient is age 18 or older           Routing refill request to provider for review/approval because:  Labs not current:  Cr  Due for PHQ-9  Due for BP check  LOV >6 months ago    Cathi Elise RN  [facility]          "

## 2021-06-08 DIAGNOSIS — F33.1 MAJOR DEPRESSIVE DISORDER, RECURRENT EPISODE, MODERATE (H): ICD-10-CM

## 2021-06-09 NOTE — TELEPHONE ENCOUNTER
Reason for Call:  Other prescription    Detailed comments: Prescription was sent to incorrect pharmacy, I have attached the correct pharmacy on this encounter. Please advise, Thanks!    Phone Number Patient can be reached at: Cell number on file:    Telephone Information:   Mobile [phone]       Best Time: any    Can we leave a detailed message on this number? YES    Call taken on 6/9/2021 at 3:07 PM by aCro Osman

## 2021-06-10 ENCOUNTER — TELEPHONE (OUTPATIENT)
Dept: FAMILY MEDICINE | Facility: CLINIC | Age: 26
End: 2021-06-10

## 2021-06-10 DIAGNOSIS — F33.1 MAJOR DEPRESSIVE DISORDER, RECURRENT EPISODE, MODERATE (H): ICD-10-CM

## 2021-06-10 RX ORDER — VENLAFAXINE HYDROCHLORIDE 75 MG/1
CAPSULE, EXTENDED RELEASE ORAL
Qty: 90 CAPSULE | Refills: 0 | OUTPATIENT
Start: 2021-06-10

## 2021-06-10 RX ORDER — VENLAFAXINE HYDROCHLORIDE 225 MG/1
TABLET, EXTENDED RELEASE ORAL
Qty: 30 TABLET | Refills: 0 | Status: SHIPPED | OUTPATIENT
Start: 2021-06-10 | End: 2021-07-15

## 2021-06-10 NOTE — TELEPHONE ENCOUNTER
Reason for Call:  Medication or medication refill:    Do you use a [facility]?  Name of the pharmacy and phone number for the current request:   [address] [place] [address]  Ph: [phone]    Name of the medication requested:   venlafaxine (EFFEXOR-ER) 225 MG 24 hr tablet     Other request: Patient states he is out of the medication and would like it to be refilled ASAP    Can we leave a detailed message on this number? NO    Phone number patient can be reached at: Home number on file [phone] (home)    Best Time: ANY    Call taken on 6/10/2021 at 12:05 PM by Sarah Mariano

## 2021-06-10 NOTE — TELEPHONE ENCOUNTER
Pt called-He will have insurance in July- He is out of the medication and feeling withdrawls. Medication refilled to cover. He verbalized understanding to make an appointment asap so he has one in place in July.

## 2021-06-10 NOTE — TELEPHONE ENCOUNTER
"Stephy,    Pt called regarding this request, states that he is completely out. Sent a request on 6/8.    I tried to get pt scheduled for follow-up with you since he is overdue, pt stated he currently does not have health insurance but is working on getting insurance through [employer], should be covered starting in July. Plans to schedule once he has insurance coverage - informed pt that you will be on leave until September but pt can schedule with another provider.    Requested Prescriptions   Pending Prescriptions Disp Refills     venlafaxine (EFFEXOR-ER) 225 MG 24 hr tablet 30 tablet 0     Sig: Take 1 tablet (225 mg) by mouth once daily.       Serotonin-Norepinephrine Reuptake Inhibitors  Failed - 6/10/2021 12:29 PM        Failed - Blood pressure under 140/90 in past 12 months     BP Readings from Last 3 Encounters:   04/19/19 126/82   01/11/19 138/84   09/25/18 126/76                 Failed - PHQ-9 score of less than 5 in past 6 months     Please review last PHQ-9 score.           Failed - Normal serum creatinine on file in past 12 months     No lab results found.    Ok to refill medication if creatinine is low          Failed - Recent (6 mo) or future (30 days) visit within the authorizing provider's specialty     Patient had office visit in the last 6 months or has a visit in the next 30 days with authorizing provider or within the authorizing provider's specialty.  See \"Patient Info\" tab in inbasket, or \"Choose Columns\" in Meds & Orders section of the refill encounter.            Passed - Medication is active on med list        Passed - Patient is age 18 or older           Routing refill request to provider for review/approval because:  Labs not current:  Cr  Due for f/u  Due for PHQ-9    Cathi Elise RN  [facility]          "

## 2021-07-15 DIAGNOSIS — F33.1 MAJOR DEPRESSIVE DISORDER, RECURRENT EPISODE, MODERATE (H): ICD-10-CM

## 2021-07-15 RX ORDER — VENLAFAXINE HYDROCHLORIDE 225 MG/1
TABLET, EXTENDED RELEASE ORAL
Qty: 30 TABLET | Refills: 0 | Status: SHIPPED | OUTPATIENT
Start: 2021-07-15 | End: 2021-08-15

## 2021-07-15 NOTE — TELEPHONE ENCOUNTER
Patient calling to request refill. Still unable to get on insurance.  Completely out of medication.   Please send to [id number] - [place], [place] - [address] [place] [address] WAY     Requests 150mg and 75mg as it is cheaper then the 225mg

## 2021-08-14 DIAGNOSIS — F33.1 MAJOR DEPRESSIVE DISORDER, RECURRENT EPISODE, MODERATE (H): ICD-10-CM

## 2021-08-15 RX ORDER — VENLAFAXINE HYDROCHLORIDE 75 MG/1
CAPSULE, EXTENDED RELEASE ORAL
Qty: 30 CAPSULE | Refills: 0 | Status: SHIPPED | OUTPATIENT
Start: 2021-08-15 | End: 2021-09-14

## 2021-08-16 DIAGNOSIS — F33.1 MAJOR DEPRESSIVE DISORDER, RECURRENT EPISODE, MODERATE (H): ICD-10-CM

## 2021-08-17 RX ORDER — VENLAFAXINE HYDROCHLORIDE 150 MG/1
CAPSULE, EXTENDED RELEASE ORAL
Qty: 30 CAPSULE | Refills: 0 | Status: SHIPPED | OUTPATIENT
Start: 2021-08-17 | End: 2021-09-14

## 2021-10-09 ENCOUNTER — HEALTH MAINTENANCE LETTER (OUTPATIENT)
Age: 26
End: 2021-10-09

## 2021-12-11 DIAGNOSIS — F33.1 MAJOR DEPRESSIVE DISORDER, RECURRENT EPISODE, MODERATE (H): ICD-10-CM

## 2021-12-13 RX ORDER — VENLAFAXINE HYDROCHLORIDE 75 MG/1
CAPSULE, EXTENDED RELEASE ORAL
Qty: 30 CAPSULE | Refills: 0 | Status: SHIPPED | OUTPATIENT
Start: 2021-12-13 | End: 2022-01-16

## 2021-12-13 RX ORDER — VENLAFAXINE HYDROCHLORIDE 150 MG/1
CAPSULE, EXTENDED RELEASE ORAL
Qty: 30 CAPSULE | Refills: 0 | Status: SHIPPED | OUTPATIENT
Start: 2021-12-13 | End: 2022-01-16

## 2021-12-13 NOTE — TELEPHONE ENCOUNTER
Last visit was over a year ago.  Needs to schedule a virtual visit.  Will refill x1 month.  JACKIE Steinberg

## 2022-01-13 DIAGNOSIS — F33.1 MAJOR DEPRESSIVE DISORDER, RECURRENT EPISODE, MODERATE (H): ICD-10-CM

## 2022-01-13 NOTE — TELEPHONE ENCOUNTER
Reason for Call:  Medication or medication refill:    Do you use a [facility]?  Name of the pharmacy and phone number for the current request:  [facility] [address] [place] [address] (Pharmacy)    Name of the medication requested:   venlafaxine (EFFEXOR-XR) 150 MG 24 hr capsule  venlafaxine (EFFEXOR-XR) 75 MG 24 hr capsule    Other request: n/a    Can we leave a detailed message on this number? YES    Phone number patient can be reached at: Cell number on file:    Telephone Information:   Mobile [phone]       Best Time: any    Call taken on 1/13/2022 at 7:36 AM by Elizabeth Willis

## 2022-01-13 NOTE — TELEPHONE ENCOUNTER
"TC/RE,    Please help pt schedule and appt. Needs updated labs, PHQ9, and a follow up depression visit. Once scheduled can ask PCP for rio fill.       Requested Prescriptions   Pending Prescriptions Disp Refills     venlafaxine (EFFEXOR-XR) 75 MG 24 hr capsule 30 capsule 0       Serotonin-Norepinephrine Reuptake Inhibitors  Failed - 1/13/2022  7:36 AM        Failed - Blood pressure under 140/90 in past 12 months     BP Readings from Last 3 Encounters:   04/19/19 126/82   01/11/19 138/84   09/25/18 126/76                 Failed - PHQ-9 score of less than 5 in past 6 months     Please review last PHQ-9 score.           Failed - Normal serum creatinine on file in past 12 months     No lab results found.    Ok to refill medication if creatinine is low          Failed - Recent (6 mo) or future (30 days) visit within the authorizing provider's specialty     Patient had office visit in the last 6 months or has a visit in the next 30 days with authorizing provider or within the authorizing provider's specialty.  See \"Patient Info\" tab in inbasket, or \"Choose Columns\" in Meds & Orders section of the refill encounter.            Passed - Medication is active on med list        Passed - Patient is age 18 or older           venlafaxine (EFFEXOR-XR) 150 MG 24 hr capsule 30 capsule 0       Serotonin-Norepinephrine Reuptake Inhibitors  Failed - 1/13/2022  7:36 AM        Failed - Blood pressure under 140/90 in past 12 months     BP Readings from Last 3 Encounters:   04/19/19 126/82   01/11/19 138/84   09/25/18 126/76                 Failed - PHQ-9 score of less than 5 in past 6 months     Please review last PHQ-9 score.           Failed - Normal serum creatinine on file in past 12 months     No lab results found.    Ok to refill medication if creatinine is low          Failed - Recent (6 mo) or future (30 days) visit within the authorizing provider's specialty     Patient had office visit in the last 6 months or has a visit in " "the next 30 days with authorizing provider or within the authorizing provider's specialty.  See \"Patient Info\" tab in inbasket, or \"Choose Columns\" in Meds & Orders section of the refill encounter.            Passed - Medication is active on med list        Passed - Patient is age 18 or older             Lyubov Guzman RN  [facility]     "

## 2022-01-14 RX ORDER — VENLAFAXINE HYDROCHLORIDE 150 MG/1
CAPSULE, EXTENDED RELEASE ORAL
Qty: 90 CAPSULE | Refills: 0 | OUTPATIENT
Start: 2022-01-14

## 2022-01-14 RX ORDER — VENLAFAXINE HYDROCHLORIDE 75 MG/1
CAPSULE, EXTENDED RELEASE ORAL
Qty: 90 CAPSULE | Refills: 0 | OUTPATIENT
Start: 2022-01-14

## 2022-01-14 NOTE — TELEPHONE ENCOUNTER
Patient is scheduled.    Future Appointments   Date Time Provider Department Center   3/22/2022  2:20 PM Julia Maurer, APRN CNP [facility]     Jewell Samson,      [facility]

## 2022-01-14 NOTE — TELEPHONE ENCOUNTER
"Stephy,    Requested Prescriptions   Pending Prescriptions Disp Refills     venlafaxine (EFFEXOR-XR) 75 MG 24 hr capsule 30 capsule 0     Sig: TAKE 1 CAPSULE BY MOUTH ONCE DAILY WITH  THE  150MG  CAPSULE  FOR  A  TOTAL  DOSE  OF  225MG  (MUST  BE  SEEN  FOR  FURTHER  REFILLS)       Serotonin-Norepinephrine Reuptake Inhibitors  Failed - 1/14/2022  7:17 AM        Failed - Blood pressure under 140/90 in past 12 months     BP Readings from Last 3 Encounters:   04/19/19 126/82   01/11/19 138/84   09/25/18 126/76                 Failed - PHQ-9 score of less than 5 in past 6 months     Please review last PHQ-9 score.           Failed - Normal serum creatinine on file in past 12 months     No lab results found.    Ok to refill medication if creatinine is low          Failed - Recent (6 mo) or future (30 days) visit within the authorizing provider's specialty     Patient had office visit in the last 6 months or has a visit in the next 30 days with authorizing provider or within the authorizing provider's specialty.  See \"Patient Info\" tab in inbasket, or \"Choose Columns\" in Meds & Orders section of the refill encounter.            Passed - Medication is active on med list        Passed - Patient is age 18 or older           venlafaxine (EFFEXOR-XR) 150 MG 24 hr capsule 30 capsule 0     Sig: TAKE 1 CAPSULE BY MOUTH ONCE DAILY WITH THE 75MG CAP FOR A TOTAL DOSE OF 225MG       Serotonin-Norepinephrine Reuptake Inhibitors  Failed - 1/14/2022  7:17 AM        Failed - Blood pressure under 140/90 in past 12 months     BP Readings from Last 3 Encounters:   04/19/19 126/82   01/11/19 138/84   09/25/18 126/76                 Failed - PHQ-9 score of less than 5 in past 6 months     Please review last PHQ-9 score.           Failed - Normal serum creatinine on file in past 12 months     No lab results found.    Ok to refill medication if creatinine is low          Failed - Recent (6 mo) or future (30 days) visit within the authorizing " "provider's specialty     Patient had office visit in the last 6 months or has a visit in the next 30 days with authorizing provider or within the authorizing provider's specialty.  See \"Patient Info\" tab in inbasket, or \"Choose Columns\" in Meds & Orders section of the refill encounter.            Passed - Medication is active on med list        Passed - Patient is age 18 or older           Routing refill request to provider for review/approval because:  Labs not current    Cathi Elise RN  [facility]          "

## 2022-01-14 NOTE — TELEPHONE ENCOUNTER
Has been over a year since seen here, or forward refill to person who prescribes this now please. If needs a short bridge of med his Primary Care Provider may consider

## 2022-01-16 ENCOUNTER — NURSE TRIAGE (OUTPATIENT)
Dept: NURSING | Facility: CLINIC | Age: 27
End: 2022-01-16

## 2022-01-16 DIAGNOSIS — F33.1 MAJOR DEPRESSIVE DISORDER, RECURRENT EPISODE, MODERATE (H): ICD-10-CM

## 2022-01-16 RX ORDER — VENLAFAXINE HYDROCHLORIDE 75 MG/1
75 CAPSULE, EXTENDED RELEASE ORAL DAILY
Qty: 30 CAPSULE | Refills: 0 | Status: SHIPPED | OUTPATIENT
Start: 2022-01-16 | End: 2022-01-18

## 2022-01-16 RX ORDER — VENLAFAXINE HYDROCHLORIDE 150 MG/1
150 CAPSULE, EXTENDED RELEASE ORAL DAILY
Qty: 30 CAPSULE | Refills: 0 | Status: SHIPPED | OUTPATIENT
Start: 2022-01-16 | End: 2022-01-18

## 2022-01-16 NOTE — TELEPHONE ENCOUNTER
Pt calling re: med refill request.  Pt has a scheduled appt for 1/18/22 and is out of Venlafaxine and requesting refill of meds as he is out today.    Please refill medication if appropriate.  Pharmacy on file [facility] [place], [place]    Thank you  Sunita Velarde RN, [facility] Nurse Advisor      Routing refill request to provider for review/approval because:  Patient needs to be seen because it has been more than 1 year since last office visit.    Last Written Prescription Date:  12/13/21  Last Fill Quantity: 30,  # refills: 0   Last office visit provider:  10/23/20     Requested Prescriptions   Pending Prescriptions Disp Refills     venlafaxine (EFFEXOR-XR) 150 MG 24 hr capsule 30 capsule 0       There is no refill protocol information for this order        venlafaxine (EFFEXOR-XR) 75 MG 24 hr capsule 30 capsule 0       There is no refill protocol information for this order          sunita velarde RN 01/16/22 1:52 PM

## 2022-01-18 ENCOUNTER — VIRTUAL VISIT (OUTPATIENT)
Dept: FAMILY MEDICINE | Facility: CLINIC | Age: 27
End: 2022-01-18
Payer: COMMERCIAL

## 2022-01-18 DIAGNOSIS — F33.1 MAJOR DEPRESSIVE DISORDER, RECURRENT EPISODE, MODERATE (H): ICD-10-CM

## 2022-01-18 PROCEDURE — 99213 OFFICE O/P EST LOW 20 MIN: CPT | Mod: 95 | Performed by: NURSE PRACTITIONER

## 2022-01-18 RX ORDER — VENLAFAXINE HYDROCHLORIDE 75 MG/1
75 CAPSULE, EXTENDED RELEASE ORAL DAILY
Qty: 90 CAPSULE | Refills: 3 | Status: SHIPPED | OUTPATIENT
Start: 2022-01-18 | End: 2023-02-09

## 2022-01-18 RX ORDER — VENLAFAXINE HYDROCHLORIDE 150 MG/1
150 CAPSULE, EXTENDED RELEASE ORAL DAILY
Qty: 90 CAPSULE | Refills: 3 | Status: SHIPPED | OUTPATIENT
Start: 2022-01-18 | End: 2023-02-01

## 2022-01-18 NOTE — PROGRESS NOTES
Dominic is a 26 year old who is being evaluated via a billable video visit.      How would you like to obtain your AVS? MyChart  If the video visit is dropped, the invitation should be resent by:   Will anyone else be joining your video visit?     Video Start Time: 7:40 AM    Assessment & Plan       ICD-10-CM    1. Major depressive disorder, recurrent episode, moderate (H)  F33.1 venlafaxine (EFFEXOR-XR) 75 MG 24 hr capsule     venlafaxine (EFFEXOR-XR) 150 MG 24 hr capsule    remission, wants current dose continued for the year, no side effects and doing well  Works in [place] residence in [place], continue good lifestyle habits      Regular exercise    No follow-ups on file.    ZARA Martinez CNP  [facility]   Dominic is a 26 year old who presents for the following health issues     HPI   wants current dose 225 effexor continued for the year, no side effects and doing well  self care great, minimal rare HAs  History of migraines, thought about preventive med but then now very rare  Thinks it was the selective serotonin reuptake inhibitor he did initally that made his HA worse    PHQ 5/30/2019 10/23/2020 3/12/2021   PHQ-9 Total Score 15 5 13   Q9: Thoughts of better off dead/self-harm past 2 weeks Several days Not at all Several days   F/U: Thoughts of suicide or self-harm - - -   F/U: Self harm-plan - - -   F/U: Self-harm action - - -   F/U: Safety concerns - - -   Some encounter information is confidential and restricted. Go to Review [employer] activity to see all data.     ZOILA-7 SCORE 10/23/2020 3/1/2021 3/12/2021   Total Score - 10 (moderate anxiety) -   Total Score 8 - 12   Some encounter information is confidential and restricted. Go to Review Flowsheets activity to see all data.             Review of Systems   Constitutional, HEENT, cardiovascular, pulmonary, GI, , musculoskeletal, neuro, skin, endocrine and psych systems are negative, except as otherwise noted.       Objective           Vitals:  No vitals were obtained today due to virtual visit.    Physical Exam   GENERAL: Healthy, alert and no distress  EYES: Eyes grossly normal to inspection.  No discharge or erythema, or obvious scleral/conjunctival abnormalities.  RESP: No audible wheeze, cough, or visible cyanosis.  No visible retractions or increased work of breathing.    SKIN: Visible skin clear. No significant rash, abnormal pigmentation or lesions.  NEURO: Cranial nerves grossly intact.  Mentation and speech appropriate for age.  PSYCH: Mentation appears normal, affect normal/bright, judgement and insight intact, normal speech and appearance well-groomed.                Video-Visit Details    Type of service:  Video Visit    Video End Time:7:57 AM    Originating Location (pt. Location): Home    Distant Location (provider location):  [facility]     Platform used for Video Visit: [employer]

## 2022-05-16 ENCOUNTER — HEALTH MAINTENANCE LETTER (OUTPATIENT)
Age: 27
End: 2022-05-16

## 2022-09-11 ENCOUNTER — HEALTH MAINTENANCE LETTER (OUTPATIENT)
Age: 27
End: 2022-09-11

## 2023-02-01 ENCOUNTER — TELEPHONE (OUTPATIENT)
Dept: FAMILY MEDICINE | Facility: CLINIC | Age: 28
End: 2023-02-01

## 2023-02-01 DIAGNOSIS — F33.1 MAJOR DEPRESSIVE DISORDER, RECURRENT EPISODE, MODERATE (H): ICD-10-CM

## 2023-02-01 RX ORDER — VENLAFAXINE HYDROCHLORIDE 150 MG/1
150 CAPSULE, EXTENDED RELEASE ORAL DAILY
Qty: 30 CAPSULE | Refills: 1 | Status: SHIPPED | OUTPATIENT
Start: 2023-02-01 | End: 2023-07-05

## 2023-02-01 NOTE — TELEPHONE ENCOUNTER
Reason for Call:  Medication or medication refill:    Do you use a [facility]?  Name of the pharmacy and phone number for the current request:    [facility] [address] [place] [address]    Name of the medication requested: venlafaxine (EFFEXOR-XR) 75 MG 24 hr capsule     Other request: venlafaxine (EFFEXOR-XR) 150 MG 24 hr capsule      Can we leave a detailed message on this number? YES    Phone number patient can be reached at: Home number on file [phone] (home)    Best Time: ANYTIME    Call taken on 2/1/2023 at 9:13 AM by Ebenezer Deleon

## 2023-02-08 DIAGNOSIS — F33.1 MAJOR DEPRESSIVE DISORDER, RECURRENT EPISODE, MODERATE (H): ICD-10-CM

## 2023-02-08 NOTE — TELEPHONE ENCOUNTER
"Requested Prescriptions   Pending Prescriptions Disp Refills     venlafaxine (EFFEXOR XR) 75 MG 24 hr capsule 90 capsule 3     Sig: Take 1 capsule (75 mg) by mouth daily With 150 mg dose       Serotonin-Norepinephrine Reuptake Inhibitors  Failed - 2/8/2023  1:43 PM        Failed - Blood pressure under 140/90 in past 12 months     BP Readings from Last 3 Encounters:   04/19/19 126/82   01/11/19 138/84   09/25/18 126/76                 Failed - PHQ-9 score of less than 5 in past 6 months     Please review last PHQ-9 score.           Failed - Normal serum creatinine on file in past 12 months     No lab results found.    Ok to refill medication if creatinine is low          Passed - Medication is active on med list        Passed - Patient is age 18 or older        Passed - Recent (6 mo) or future (30 days) visit within the authorizing provider's specialty     Patient had office visit in the last 6 months or has a visit in the next 30 days with authorizing provider or within the authorizing provider's specialty.  See \"Patient Info\" tab in inbasket, or \"Choose Columns\" in Meds & Orders section of the refill encounter.               Routing refill request to provider for review/approval because medication did not pass protocol.    Pt has a appointment on 02/21/23    Susy Meyer RN  [facility]   "

## 2023-02-09 RX ORDER — VENLAFAXINE HYDROCHLORIDE 75 MG/1
75 CAPSULE, EXTENDED RELEASE ORAL DAILY
Qty: 90 CAPSULE | Refills: 3 | Status: SHIPPED | OUTPATIENT
Start: 2023-02-09 | End: 2023-07-05

## 2023-06-03 ENCOUNTER — HEALTH MAINTENANCE LETTER (OUTPATIENT)
Age: 28
End: 2023-06-03

## 2023-07-03 DIAGNOSIS — G43.719 INTRACTABLE CHRONIC MIGRAINE WITHOUT AURA AND WITHOUT STATUS MIGRAINOSUS: ICD-10-CM

## 2023-07-03 DIAGNOSIS — F33.1 MAJOR DEPRESSIVE DISORDER, RECURRENT EPISODE, MODERATE (H): ICD-10-CM

## 2023-07-03 NOTE — TELEPHONE ENCOUNTER
"Requested Prescriptions   Pending Prescriptions Disp Refills     venlafaxine (EFFEXOR XR) 75 MG 24 hr capsule 90 capsule 3     Sig: Take 1 capsule (75 mg) by mouth daily With 150 mg dose       Serotonin-Norepinephrine Reuptake Inhibitors  Failed - 7/3/2023  2:14 PM        Failed - Blood pressure under 140/90 in past 12 months     BP Readings from Last 3 Encounters:   04/19/19 126/82   01/11/19 138/84   09/25/18 126/76                 Failed - PHQ-9 score of less than 5 in past 6 months     Please review last PHQ-9 score.           Failed - Normal serum creatinine on file in past 12 months     No lab results found.    Ok to refill medication if creatinine is low          Failed - Recent (6 mo) or future (30 days) visit within the authorizing provider's specialty     Patient had office visit in the last 6 months or has a visit in the next 30 days with authorizing provider or within the authorizing provider's specialty.  See \"Patient Info\" tab in inbasket, or \"Choose Columns\" in Meds & Orders section of the refill encounter.            Passed - Medication is active on med list        Passed - Patient is age 18 or older           venlafaxine (EFFEXOR XR) 150 MG 24 hr capsule 30 capsule 1     Sig: Take 1 capsule (150 mg) by mouth daily With 75 mg dose       Serotonin-Norepinephrine Reuptake Inhibitors  Failed - 7/3/2023  2:14 PM        Failed - Blood pressure under 140/90 in past 12 months     BP Readings from Last 3 Encounters:   04/19/19 126/82   01/11/19 138/84   09/25/18 126/76                 Failed - PHQ-9 score of less than 5 in past 6 months     Please review last PHQ-9 score.           Failed - Normal serum creatinine on file in past 12 months     No lab results found.    Ok to refill medication if creatinine is low          Failed - Recent (6 mo) or future (30 days) visit within the authorizing provider's specialty     Patient had office visit in the last 6 months or has a visit in the next 30 days with " "authorizing provider or within the authorizing provider's specialty.  See \"Patient Info\" tab in inbasket, or \"Choose Columns\" in Meds & Orders section of the refill encounter.            Passed - Medication is active on med list        Passed - Patient is age 18 or older         Routing refill request to provider for review/approval because:  Patient needs to be seen because it has been more than 1 year since last office visit.    Pt has a appointment for 9/15/23    Susy Meyer RN  [facility]     "

## 2023-07-03 NOTE — TELEPHONE ENCOUNTER
"Requested Prescriptions   Pending Prescriptions Disp Refills     venlafaxine (EFFEXOR XR) 75 MG 24 hr capsule 90 capsule 3     Sig: Take 1 capsule (75 mg) by mouth daily With 150 mg dose       Serotonin-Norepinephrine Reuptake Inhibitors  Failed - 7/3/2023  2:14 PM        Failed - Blood pressure under 140/90 in past 12 months     BP Readings from Last 3 Encounters:   04/19/19 126/82   01/11/19 138/84   09/25/18 126/76                 Failed - PHQ-9 score of less than 5 in past 6 months     Please review last PHQ-9 score.           Failed - Normal serum creatinine on file in past 12 months     No lab results found.    Ok to refill medication if creatinine is low          Failed - Recent (6 mo) or future (30 days) visit within the authorizing provider's specialty     Patient had office visit in the last 6 months or has a visit in the next 30 days with authorizing provider or within the authorizing provider's specialty.  See \"Patient Info\" tab in inbasket, or \"Choose Columns\" in Meds & Orders section of the refill encounter.            Passed - Medication is active on med list        Passed - Patient is age 18 or older           venlafaxine (EFFEXOR XR) 150 MG 24 hr capsule 30 capsule 1     Sig: Take 1 capsule (150 mg) by mouth daily With 75 mg dose       Serotonin-Norepinephrine Reuptake Inhibitors  Failed - 7/3/2023  2:14 PM        Failed - Blood pressure under 140/90 in past 12 months     BP Readings from Last 3 Encounters:   04/19/19 126/82   01/11/19 138/84   09/25/18 126/76                 Failed - PHQ-9 score of less than 5 in past 6 months     Please review last PHQ-9 score.           Failed - Normal serum creatinine on file in past 12 months     No lab results found.    Ok to refill medication if creatinine is low          Failed - Recent (6 mo) or future (30 days) visit within the authorizing provider's specialty     Patient had office visit in the last 6 months or has a visit in the next 30 days with " "authorizing provider or within the authorizing provider's specialty.  See \"Patient Info\" tab in inbasket, or \"Choose Columns\" in Meds & Orders section of the refill encounter.            Passed - Medication is active on med list        Passed - Patient is age 18 or older           Sent Mychart msg with PHQ9,. Pt has appt scheduled with PCP 9/15/23    Aylin MUSTAFA RN  [facility]    "

## 2023-07-05 RX ORDER — VENLAFAXINE HYDROCHLORIDE 150 MG/1
150 CAPSULE, EXTENDED RELEASE ORAL DAILY
Qty: 30 CAPSULE | Refills: 1 | Status: SHIPPED | OUTPATIENT
Start: 2023-07-05 | End: 2023-09-28

## 2023-07-05 RX ORDER — VENLAFAXINE HYDROCHLORIDE 75 MG/1
75 CAPSULE, EXTENDED RELEASE ORAL DAILY
Qty: 90 CAPSULE | Refills: 3 | Status: SHIPPED | OUTPATIENT
Start: 2023-07-05 | End: 2024-06-25

## 2023-07-05 NOTE — TELEPHONE ENCOUNTER
Pt calling regarding refills. Reaching out to see if another provider would be willing to sign, thanks    Susy Meyer RN  [facility]

## 2023-09-28 ENCOUNTER — VIRTUAL VISIT (OUTPATIENT)
Dept: FAMILY MEDICINE | Facility: CLINIC | Age: 28
End: 2023-09-28

## 2023-09-28 DIAGNOSIS — F33.1 MAJOR DEPRESSIVE DISORDER, RECURRENT EPISODE, MODERATE (H): Primary | ICD-10-CM

## 2023-09-28 DIAGNOSIS — R41.840 LACK OF CONCENTRATION: ICD-10-CM

## 2023-09-28 DIAGNOSIS — F41.1 GAD (GENERALIZED ANXIETY DISORDER): ICD-10-CM

## 2023-09-28 PROCEDURE — 96127 BRIEF EMOTIONAL/BEHAV ASSMT: CPT | Mod: 95 | Performed by: NURSE PRACTITIONER

## 2023-09-28 PROCEDURE — 99215 OFFICE O/P EST HI 40 MIN: CPT | Mod: 95 | Performed by: NURSE PRACTITIONER

## 2023-09-28 RX ORDER — VENLAFAXINE HYDROCHLORIDE 150 MG/1
150 CAPSULE, EXTENDED RELEASE ORAL DAILY
Qty: 90 CAPSULE | Refills: 1 | Status: SHIPPED | OUTPATIENT
Start: 2023-09-28 | End: 2023-12-19

## 2023-09-28 ASSESSMENT — ANXIETY QUESTIONNAIRES
GAD7 TOTAL SCORE: 3
2. NOT BEING ABLE TO STOP OR CONTROL WORRYING: SEVERAL DAYS
4. TROUBLE RELAXING: NOT AT ALL
7. FEELING AFRAID AS IF SOMETHING AWFUL MIGHT HAPPEN: NOT AT ALL
1. FEELING NERVOUS, ANXIOUS, OR ON EDGE: SEVERAL DAYS
3. WORRYING TOO MUCH ABOUT DIFFERENT THINGS: SEVERAL DAYS
5. BEING SO RESTLESS THAT IT IS HARD TO SIT STILL: NOT AT ALL
IF YOU CHECKED OFF ANY PROBLEMS ON THIS QUESTIONNAIRE, HOW DIFFICULT HAVE THESE PROBLEMS MADE IT FOR YOU TO DO YOUR WORK, TAKE CARE OF THINGS AT HOME, OR GET ALONG WITH OTHER PEOPLE: SOMEWHAT DIFFICULT
6. BECOMING EASILY ANNOYED OR IRRITABLE: NOT AT ALL
GAD7 TOTAL SCORE: 3

## 2023-09-28 ASSESSMENT — PATIENT HEALTH QUESTIONNAIRE - PHQ9
10. IF YOU CHECKED OFF ANY PROBLEMS, HOW DIFFICULT HAVE THESE PROBLEMS MADE IT FOR YOU TO DO YOUR WORK, TAKE CARE OF THINGS AT HOME, OR GET ALONG WITH OTHER PEOPLE: SOMEWHAT DIFFICULT
SUM OF ALL RESPONSES TO PHQ QUESTIONS 1-9: 5
SUM OF ALL RESPONSES TO PHQ QUESTIONS 1-9: 5

## 2023-09-28 NOTE — PROGRESS NOTES
Dominic is a 28 year old who is being evaluated via a billable video visit.      How would you like to obtain your AVS? MyChart  If the video visit is dropped, the invitation should be resent by: Text to cell phone: [phone]  Will anyone else be joining your video visit? No          Assessment & Plan     Major depressive disorder, recurrent episode, moderate (H)  Opts to continue venlafaxine monotherapy at this time. At max dose for this. If wanting to augment, would consider bupropion addition rather than change to entirely new medication. If trial of new medication, consider Viibryd. Recommend re-establish with therapy  - venlafaxine (EFFEXOR XR) 150 MG 24 hr capsule; Take 1 capsule (150 mg) by mouth daily With 75 mg dose    ZOILA (generalized anxiety disorder)  As above    Lack of concentration  Discussed getting DA  I can prescribe stimulant if desired  Wouldn't be alble to take straterra right now due to venlafaxine  - Adult Mental Health  Referral; Future    Prescription drug management  I spent a total of 44 minutes on the day of the visit.   Time spent by me doing chart review, history and exam, documentation and further activities per the note       Patient Instructions   Schedule ADHD diagnostic assessment - [place] is one option    [facility]  Phone Number  [phone]  Send Us An Email  [email or web address]    Continue venlafaxine 225 mg. This is the max dose for this medication  We could add Wellbutrin  mg to this if you wanted  I think you could also add therapy and regular exercise and get a boost from these as well    Follow-up six month for physical and mental health - sooner if needed    ZARA Brand CNP  [facility]    Ben Alfaro is a 28 year old, presenting for the following health issues:   Follow Up      History of Present Illness       Mental Health Follow-up:  Patient presents to follow-up on Depression  "& Anxiety.Patient's depression since last visit has been:  Better  The patient is not having other symptoms associated with depression.  Patient's anxiety since last visit has been:  Better  The patient is having other symptoms associated with anxiety.  Any significant life events: No  Patient is not feeling anxious or having panic attacks.  Patient has no concerns about alcohol or drug use.    He eats 0-1 servings of fruits and vegetables daily.He consumes 2 sweetened beverage(s) daily.He exercises with enough effort to increase his heart rate 10 to 19 minutes per day.  He exercises with enough effort to increase his heart rate 3 or less days per week.   He is taking medications regularly.     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  Doing well for mental health  Thinking about going back to school - cannabis lab?  Wondering about getting an ADHD evaluation - as a child it didn't come up because he did well in school. Parents did wonder about it at home. One of his moms (bio-mom) has ADHD.     Depression and anxiety - taking venlafaxine  mg for a couple years. Was thinking about increasing the dose. Has found himself falling back into bad habits, ruminating, \"being stuck in own head,\" lack of interest, and fatigue. Feels depression is more predominant. Anxiety feels more controlled with DBT skills. Did DBT group in 2021.    Hasn't been to therapy in a year because therapist retired.  Exercise - physical for work, playing tennis with friends  Sleep - \"ok\" if anything sleeping too much (notices worse mood if sleeping in), not waking up in the night, sleeps from 2 am - 12 pm.  Substances - 10 oz cup coffee in AM and afternoon, no alcohol or cigarettes, smoking marijuana daily for past 1-2 years  Spends time outside  Sees friends, Uma, Alexis Gallego and Jeronimo, regularly. About every other day.       Previous med trials  Prozac (fluoxetine) YES- trial one month 1/2017: sexual side effects and " "increase in migraines  Zoloft (sertraline) YES- YES- trial one month 1/2017: sexual side effects  Celexa (citalopram) YES- trial one month 2/2017: restless legs, trouble sleeping  Lexapro (escitalopram) no  Paxil (paroxetine) no  Wellbutrin (bupropion) YES- 150 mg ER --> 300 mg remember it being \"ok\" and nothing too bad of side effects. MTM notes from 8/2018 reflect that it was not entirely covering symptoms and so med switched to venlafaxine  Effexor (venlafaxine) YES- currently and at max dose  Abilify (aripiprazole) no  Lamictal (lamotrigine) no  Buspar (buspirone) no  Others:  no    PRNS  Vistaril/Atarax (hydroxizine):  no  Propranolol:  no  Benzodiazepine: no        3/1/2021     2:37 PM 3/12/2021     4:00 PM 9/28/2023    10:27 AM   PHQ   PHQ-9 Total Score 13 13 5   Q9: Thoughts of better off dead/self-harm past 2 weeks Not at all Several days Not at all         Objective           Vitals:  No vitals were obtained today due to virtual visit.    Physical Exam   GENERAL: Healthy, alert and no distress  EYES: Eyes grossly normal to inspection.  No discharge or erythema, or obvious scleral/conjunctival abnormalities.  RESP: No audible wheeze, cough, or visible cyanosis.  No visible retractions or increased work of breathing.    SKIN: Visible skin clear. No significant rash, abnormal pigmentation or lesions.  NEURO: Cranial nerves grossly intact.  Mentation and speech appropriate for age.  PSYCH: Mentation appears normal, affect normal/bright, judgement and insight intact, normal speech and appearance well-groomed.          Video-Visit Details    Type of service:  Video Visit   Video Start Time: 11:04 AM  Video End Time:11:45 AM    Originating Location (pt. Location): Home    Distant Location (provider location):  On-site  Platform used for Video Visit: Karly      "

## 2023-09-28 NOTE — COMMUNITY RESOURCES LIST (ENGLISH)
09/28/2023   [facility]  N/A  For questions about this resource list or additional care needs, please contact your primary care clinic or care manager.  Phone: [phone]   Email: N/A   Address: [address] [place] [address]   Hours: N/A        Food and Nutrition       Food pantry  1  [employer] [place] [facility] [employer] of [employer] Work Distance: 1.47 miles      Delivery, [facility]   [address] [place] [address]  Language: English  Hours: Mon 1:00 PM - 6:00 PM , Tue 9:30 AM - 2:30 PM , Wed - Thu 9:30 AM - 2:00 PM  Fees: Free   Phone: (925) 856-2371 Email: info@[email or web address] Website: [email or web address] [email or web address]/     2  [facility] Food Market Distance: 1.54 miles      [facility]   [address] [place] [address] [address], [address] [place] [address]  Language: Luxembourgish, English, Russian, Yi, Swahili  Hours: Mon - Wed 9:00 AM - 11:30 AM , Tue 1:00 PM - 3:30 PM , Wed 1:00 PM - 4:00 PM  Fees: Free   Phone: (239) 249-6021 Email: info@[facility].org Website: https://[place]ighborhoodhouse[place].org/programs/food-support/food-markets/     SNAP application assistance  3  [employer] ([facility] Distance: 2.2 miles      Phone/Virtual   [address] [place] [address]  Language: English  Hours: Mon - Fri 8:00 AM - 4:30 PM  Fees: Free   Phone: (779) 738-5784 Email: info@[employer].org Website: http://www.[employer].org/     4  [employer] [place] Distance: 3.74 miles      Phone/Virtual   [address] [place] [address]  Language: English, Hmong, Latvian, Russian, Yi  Hours: Mon - Fri 8:30 AM - 4:30 PM  Fees: Free   Phone: (357) 285-4397 Email: helpline@[employer].org Website: https://www.[employer].org/programs/[place]-food-helpline/     Soup kitchen or free meals  5  [employer] [place] [facility] Distance: 1.53 miles      [address] [address]   Language: English  Hours: Tue 2:00 PM - 4:00 PM , Thu 2:00 PM - 4:00 PM  Fees: Free   Phone: (607) 216-7908 Email: ervin@[facility]. Website: https://www.[facility]/facilities/chugrr-divqtpsqs-scjvqu     6  [employer] [place] [facility] - Free Summer Meals Distance: 1.97 miles      In-Person   [address] [place] [address]  Language: English, Hmong, Turkmen  Hours: Mon - Fri 12:00 PM - 1:00 PM , Mon - Fri 3:00 PM - 4:00 PM  Fees: Free   Phone: (219) 861-5086 Email: Pilar@[facility]. Website: https://www.[facility]/departments/malagon-recreation/[place]          Important Numbers & Websites       Emergency Services   911  [facility]   311  Poison Control   (362) 251-8167  Suicide Prevention Lifeline   (431) 494-6087 (TALK)  Child Abuse Hotline   (183) 584-1340 (4-A-Child)  Sexual Assault Hotline   (157) 131-1122 (HOPE)  National Runaway Safeline   (228) 916-3708 (RUNAWAY)  All-Options Talkline   (707) 676-2669  Substance Abuse Referral   (753) 409-2148 (HELP)

## 2023-09-28 NOTE — PATIENT INSTRUCTIONS
Schedule ADHD diagnostic assessment - [place] is one option    [facility]  Phone Number  [phone]  Send Us An Email  [email or web address]    Continue venlafaxine 225 mg. This is the max dose for this medication  We could add Wellbutrin  mg to this if you wanted  I think you could also add therapy and regular exercise and get a boost from these as well    Follow-up six month for physical and mental health - sooner if needed

## 2023-12-08 ENCOUNTER — MYC REFILL (OUTPATIENT)
Dept: FAMILY MEDICINE | Facility: CLINIC | Age: 28
End: 2023-12-08

## 2023-12-08 DIAGNOSIS — F33.1 MAJOR DEPRESSIVE DISORDER, RECURRENT EPISODE, MODERATE (H): ICD-10-CM

## 2023-12-11 RX ORDER — VENLAFAXINE HYDROCHLORIDE 150 MG/1
150 CAPSULE, EXTENDED RELEASE ORAL DAILY
Qty: 90 CAPSULE | Refills: 1 | OUTPATIENT
Start: 2023-12-11

## 2023-12-11 RX ORDER — VENLAFAXINE HYDROCHLORIDE 75 MG/1
75 CAPSULE, EXTENDED RELEASE ORAL DAILY
Qty: 90 CAPSULE | Refills: 3 | OUTPATIENT
Start: 2023-12-11

## 2023-12-19 ENCOUNTER — MYC REFILL (OUTPATIENT)
Dept: FAMILY MEDICINE | Facility: CLINIC | Age: 28
End: 2023-12-19

## 2023-12-19 DIAGNOSIS — F33.1 MAJOR DEPRESSIVE DISORDER, RECURRENT EPISODE, MODERATE (H): ICD-10-CM

## 2023-12-22 RX ORDER — VENLAFAXINE HYDROCHLORIDE 150 MG/1
150 CAPSULE, EXTENDED RELEASE ORAL DAILY
Qty: 90 CAPSULE | Refills: 1 | Status: SHIPPED | OUTPATIENT
Start: 2023-12-22 | End: 2024-08-06

## 2024-06-25 DIAGNOSIS — F33.1 MAJOR DEPRESSIVE DISORDER, RECURRENT EPISODE, MODERATE (H): ICD-10-CM

## 2024-06-25 NOTE — TELEPHONE ENCOUNTER
Due for preventative in person. Please assist to schedule. I can refill to get to appt once schedule so please route back. Thanks!  JACKIE Steinberg

## 2024-07-02 RX ORDER — VENLAFAXINE HYDROCHLORIDE 75 MG/1
75 CAPSULE, EXTENDED RELEASE ORAL DAILY
Qty: 90 CAPSULE | Refills: 0 | Status: SHIPPED | OUTPATIENT
Start: 2024-07-02 | End: 2024-08-06

## 2024-07-13 ENCOUNTER — HEALTH MAINTENANCE LETTER (OUTPATIENT)
Age: 29
End: 2024-07-13

## 2024-08-06 ENCOUNTER — MYC REFILL (OUTPATIENT)
Dept: FAMILY MEDICINE | Facility: CLINIC | Age: 29
End: 2024-08-06
Payer: MEDICAID

## 2024-08-06 DIAGNOSIS — F33.1 MAJOR DEPRESSIVE DISORDER, RECURRENT EPISODE, MODERATE (H): ICD-10-CM

## 2024-08-06 RX ORDER — VENLAFAXINE HYDROCHLORIDE 75 MG/1
75 CAPSULE, EXTENDED RELEASE ORAL DAILY
Qty: 90 CAPSULE | Refills: 0 | OUTPATIENT
Start: 2024-08-06

## 2024-08-06 NOTE — TELEPHONE ENCOUNTER
Pending Prescriptions:                       Disp   Refills    venlafaxine (EFFEXOR XR) 75 MG 24 hr caps*90 cap*0            Sig: Take 1 capsule (75 mg) by mouth daily With 150 mg           dose     
19:10

## 2024-08-07 RX ORDER — VENLAFAXINE HYDROCHLORIDE 75 MG/1
75 CAPSULE, EXTENDED RELEASE ORAL DAILY
Qty: 30 CAPSULE | Refills: 0 | Status: SHIPPED | OUTPATIENT
Start: 2024-08-07 | End: 2024-09-03

## 2024-08-07 RX ORDER — VENLAFAXINE HYDROCHLORIDE 150 MG/1
150 CAPSULE, EXTENDED RELEASE ORAL DAILY
Qty: 30 CAPSULE | Refills: 0 | Status: SHIPPED | OUTPATIENT
Start: 2024-08-07 | End: 2024-09-03

## 2024-08-14 NOTE — PROGRESS NOTES
"  Adult Mental Health Outpatient Group Therapy Progress Note     Client Initial Individualized Goals for Treatment: \"Get better at managing day to day stuff.  I want to be able to successfully live my life with symptoms\".     See Initial Treatment suggestions for the client during the time between Diagnostic Assessment and completion of the Master Individualized Treatment Plan.     Treatment Goals:      To be determined     Area of Treatment Focus:  Symptom Management    Therapeutic Interventions/Treatment Strategies:  Support, Feedback, Structured Activity and Education    Response to Treatment Strategies:  Accepted Feedback, Gave Feedback, Listened, Focused on Goals, Attentive, Accepted Support and Alert    Name of Group:  Mental health management 300-350, 5 participants     Description and Outcome:  The group began with guided breathing and body scan.  The group was encouraged to use breath and body as a source of information for self.  The group was introduced to the idea of \"yielding\" into the floor as they exhaled as a source of grounding.  The group then transitioned to mindful activity of GLAD.  Group members demonstrated understanding of topic by engaging in body scan and mindful activity.  Clients verbalized understanding of topic and noted that identifying positives was easy when directed too but has not been usual way of thinking.  They contrasted this approach to thinking to perfectionism, which was discussed in group prior.        Is this a Weekly Review of the Progress on the Treatment Plan?  No        " -Recent TTE overall reassuring, demonstrated EF of 55-60%, mild tricuspid regurg  -proBNP 800s, elevated, but not too concerning at this time.  Given low suspicion for HF, can consider compression stockings (patient not inclined to wear them at this time) or elevation  -On physical exam, swelling remains unchanged, suspect venous stasis  -Previously on hydrochlorothiazide 25 mg daily was stopped due to falls. -Today's /82, will restart hydrochlorothiazide at a lower dose 12.5 mg, BMP in a week and monitor BP x 5 days.  -Consider stopping enalapril 5 mg depending on the bp reading logs.

## 2024-08-15 ENCOUNTER — MYC REFILL (OUTPATIENT)
Dept: FAMILY MEDICINE | Facility: CLINIC | Age: 29
End: 2024-08-15
Payer: MEDICAID

## 2024-08-15 DIAGNOSIS — F33.1 MAJOR DEPRESSIVE DISORDER, RECURRENT EPISODE, MODERATE (H): ICD-10-CM

## 2024-08-15 RX ORDER — VENLAFAXINE HYDROCHLORIDE 150 MG/1
150 CAPSULE, EXTENDED RELEASE ORAL DAILY
Qty: 30 CAPSULE | Refills: 0 | OUTPATIENT
Start: 2024-08-15

## 2024-08-15 RX ORDER — VENLAFAXINE HYDROCHLORIDE 75 MG/1
75 CAPSULE, EXTENDED RELEASE ORAL DAILY
Qty: 30 CAPSULE | Refills: 0 | OUTPATIENT
Start: 2024-08-15

## 2024-08-18 ENCOUNTER — MYC MEDICAL ADVICE (OUTPATIENT)
Dept: FAMILY MEDICINE | Facility: CLINIC | Age: 29
End: 2024-08-18
Payer: MEDICAID

## 2024-08-19 ENCOUNTER — MYC REFILL (OUTPATIENT)
Dept: FAMILY MEDICINE | Facility: CLINIC | Age: 29
End: 2024-08-19
Payer: MEDICAID

## 2024-08-19 DIAGNOSIS — F33.1 MAJOR DEPRESSIVE DISORDER, RECURRENT EPISODE, MODERATE (H): ICD-10-CM

## 2024-08-19 RX ORDER — VENLAFAXINE HYDROCHLORIDE 75 MG/1
75 CAPSULE, EXTENDED RELEASE ORAL DAILY
Qty: 30 CAPSULE | Refills: 0 | OUTPATIENT
Start: 2024-08-19

## 2024-08-19 RX ORDER — VENLAFAXINE HYDROCHLORIDE 150 MG/1
150 CAPSULE, EXTENDED RELEASE ORAL DAILY
Qty: 30 CAPSULE | Refills: 0 | OUTPATIENT
Start: 2024-08-19

## 2024-08-19 NOTE — TELEPHONE ENCOUNTER
Rx sent to [facility] 8/7  Instructed to contact them to     Aylin MUSTAFA RN  [facility]

## 2024-09-03 ENCOUNTER — OFFICE VISIT (OUTPATIENT)
Dept: FAMILY MEDICINE | Facility: CLINIC | Age: 29
End: 2024-09-03
Payer: MEDICAID

## 2024-09-03 VITALS
WEIGHT: 243.5 LBS | TEMPERATURE: 97.9 F | SYSTOLIC BLOOD PRESSURE: 138 MMHG | BODY MASS INDEX: 34.09 KG/M2 | RESPIRATION RATE: 20 BRPM | HEIGHT: 71 IN | HEART RATE: 85 BPM | OXYGEN SATURATION: 96 % | DIASTOLIC BLOOD PRESSURE: 110 MMHG

## 2024-09-03 DIAGNOSIS — Z11.59 NEED FOR HEPATITIS C SCREENING TEST: ICD-10-CM

## 2024-09-03 DIAGNOSIS — F33.1 MAJOR DEPRESSIVE DISORDER, RECURRENT EPISODE, MODERATE (H): ICD-10-CM

## 2024-09-03 DIAGNOSIS — Z00.00 ROUTINE GENERAL MEDICAL EXAMINATION AT A HEALTH CARE FACILITY: Primary | ICD-10-CM

## 2024-09-03 DIAGNOSIS — F90.2 ATTENTION DEFICIT HYPERACTIVITY DISORDER (ADHD), COMBINED TYPE: ICD-10-CM

## 2024-09-03 DIAGNOSIS — R03.0 ELEVATED BLOOD PRESSURE READING WITHOUT DIAGNOSIS OF HYPERTENSION: ICD-10-CM

## 2024-09-03 DIAGNOSIS — D58.2 ABNORMALITY, HEMOGLOBIN (H): ICD-10-CM

## 2024-09-03 LAB
ALBUMIN SERPL BCG-MCNC: 4.6 G/DL (ref 3.5–5.2)
ALP SERPL-CCNC: 57 U/L (ref 40–150)
ALT SERPL W P-5'-P-CCNC: 48 U/L (ref 0–70)
ANION GAP SERPL CALCULATED.3IONS-SCNC: 10 MMOL/L (ref 7–15)
AST SERPL W P-5'-P-CCNC: 38 U/L (ref 0–45)
BASOPHILS # BLD AUTO: 0 10E3/UL (ref 0–0.2)
BASOPHILS NFR BLD AUTO: 1 %
BILIRUB SERPL-MCNC: 0.4 MG/DL
BUN SERPL-MCNC: 12.2 MG/DL (ref 6–20)
CALCIUM SERPL-MCNC: 10.1 MG/DL (ref 8.8–10.4)
CHLORIDE SERPL-SCNC: 101 MMOL/L (ref 98–107)
CREAT SERPL-MCNC: 1.25 MG/DL (ref 0.67–1.17)
CREAT UR-MCNC: 178 MG/DL
EGFRCR SERPLBLD CKD-EPI 2021: 80 ML/MIN/1.73M2
EOSINOPHIL # BLD AUTO: 0.1 10E3/UL (ref 0–0.7)
EOSINOPHIL NFR BLD AUTO: 2 %
ERYTHROCYTE [DISTWIDTH] IN BLOOD BY AUTOMATED COUNT: 12.8 % (ref 10–15)
GLUCOSE SERPL-MCNC: 97 MG/DL (ref 70–99)
HCO3 SERPL-SCNC: 28 MMOL/L (ref 22–29)
HCT VFR BLD AUTO: 51.2 % (ref 40–53)
HGB BLD-MCNC: 17.8 G/DL (ref 13.3–17.7)
IMM GRANULOCYTES # BLD: 0 10E3/UL
IMM GRANULOCYTES NFR BLD: 0 %
LYMPHOCYTES # BLD AUTO: 3.2 10E3/UL (ref 0.8–5.3)
LYMPHOCYTES NFR BLD AUTO: 42 %
MCH RBC QN AUTO: 29.9 PG (ref 26.5–33)
MCHC RBC AUTO-ENTMCNC: 34.8 G/DL (ref 31.5–36.5)
MCV RBC AUTO: 86 FL (ref 78–100)
MICROALBUMIN UR-MCNC: 23.4 MG/L
MICROALBUMIN/CREAT UR: 13.15 MG/G CR (ref 0–17)
MONOCYTES # BLD AUTO: 0.6 10E3/UL (ref 0–1.3)
MONOCYTES NFR BLD AUTO: 7 %
NEUTROPHILS # BLD AUTO: 3.8 10E3/UL (ref 1.6–8.3)
NEUTROPHILS NFR BLD AUTO: 49 %
PLATELET # BLD AUTO: 272 10E3/UL (ref 150–450)
POTASSIUM SERPL-SCNC: 4.5 MMOL/L (ref 3.4–5.3)
PROT SERPL-MCNC: 7.2 G/DL (ref 6.4–8.3)
RBC # BLD AUTO: 5.95 10E6/UL (ref 4.4–5.9)
SODIUM SERPL-SCNC: 139 MMOL/L (ref 135–145)
TSH SERPL DL<=0.005 MIU/L-ACNC: 3.66 UIU/ML (ref 0.3–4.2)
WBC # BLD AUTO: 7.7 10E3/UL (ref 4–11)

## 2024-09-03 PROCEDURE — 85025 COMPLETE CBC W/AUTO DIFF WBC: CPT | Performed by: NURSE PRACTITIONER

## 2024-09-03 PROCEDURE — 82043 UR ALBUMIN QUANTITATIVE: CPT | Performed by: NURSE PRACTITIONER

## 2024-09-03 PROCEDURE — 80053 COMPREHEN METABOLIC PANEL: CPT | Performed by: NURSE PRACTITIONER

## 2024-09-03 PROCEDURE — 99395 PREV VISIT EST AGE 18-39: CPT | Performed by: NURSE PRACTITIONER

## 2024-09-03 PROCEDURE — 36415 COLL VENOUS BLD VENIPUNCTURE: CPT | Performed by: NURSE PRACTITIONER

## 2024-09-03 PROCEDURE — 93000 ELECTROCARDIOGRAM COMPLETE: CPT | Performed by: NURSE PRACTITIONER

## 2024-09-03 PROCEDURE — 82668 ASSAY OF ERYTHROPOIETIN: CPT | Mod: 90 | Performed by: NURSE PRACTITIONER

## 2024-09-03 PROCEDURE — 99214 OFFICE O/P EST MOD 30 MIN: CPT | Mod: 25 | Performed by: NURSE PRACTITIONER

## 2024-09-03 PROCEDURE — 84443 ASSAY THYROID STIM HORMONE: CPT | Performed by: NURSE PRACTITIONER

## 2024-09-03 PROCEDURE — 82570 ASSAY OF URINE CREATININE: CPT | Performed by: NURSE PRACTITIONER

## 2024-09-03 PROCEDURE — 99000 SPECIMEN HANDLING OFFICE-LAB: CPT | Performed by: NURSE PRACTITIONER

## 2024-09-03 RX ORDER — DEXTROAMPHETAMINE SACCHARATE, AMPHETAMINE ASPARTATE MONOHYDRATE, DEXTROAMPHETAMINE SULFATE AND AMPHETAMINE SULFATE 2.5; 2.5; 2.5; 2.5 MG/1; MG/1; MG/1; MG/1
10 CAPSULE, EXTENDED RELEASE ORAL DAILY
Qty: 30 CAPSULE | Refills: 0 | Status: SHIPPED | OUTPATIENT
Start: 2024-11-02 | End: 2024-12-02

## 2024-09-03 RX ORDER — VENLAFAXINE HYDROCHLORIDE 150 MG/1
150 CAPSULE, EXTENDED RELEASE ORAL DAILY
Qty: 90 CAPSULE | Refills: 1 | Status: SHIPPED | OUTPATIENT
Start: 2024-09-03

## 2024-09-03 RX ORDER — DEXTROAMPHETAMINE SACCHARATE, AMPHETAMINE ASPARTATE MONOHYDRATE, DEXTROAMPHETAMINE SULFATE AND AMPHETAMINE SULFATE 2.5; 2.5; 2.5; 2.5 MG/1; MG/1; MG/1; MG/1
10 CAPSULE, EXTENDED RELEASE ORAL DAILY
Qty: 30 CAPSULE | Refills: 0 | Status: SHIPPED | OUTPATIENT
Start: 2024-09-03 | End: 2024-10-03

## 2024-09-03 RX ORDER — DEXTROAMPHETAMINE SACCHARATE, AMPHETAMINE ASPARTATE MONOHYDRATE, DEXTROAMPHETAMINE SULFATE AND AMPHETAMINE SULFATE 2.5; 2.5; 2.5; 2.5 MG/1; MG/1; MG/1; MG/1
10 CAPSULE, EXTENDED RELEASE ORAL DAILY
Qty: 30 CAPSULE | Refills: 0 | Status: SHIPPED | OUTPATIENT
Start: 2024-10-03 | End: 2024-11-02

## 2024-09-03 RX ORDER — VENLAFAXINE HYDROCHLORIDE 75 MG/1
75 CAPSULE, EXTENDED RELEASE ORAL DAILY
Qty: 90 CAPSULE | Refills: 1 | Status: SHIPPED | OUTPATIENT
Start: 2024-09-03

## 2024-09-03 ASSESSMENT — PAIN SCALES - GENERAL: PAINLEVEL: NO PAIN (0)

## 2024-09-03 NOTE — PROGRESS NOTES
Preventive Care Visit  [facility]GR[facility]  ZARA Brand CNP, Nurse Practitioner - Family  Sep 3, 2024      Assessment & Plan     Routine general medical examination at a health care facility  Reviewed and updated health maintenance and recommendations      Attention deficit hyperactivity disorder (ADHD), combined type  LIN completed for DA paperwork  Reviewed medication options, merits, side effects, risks  Need to evaluate new HTN before starting  CSA completed. UDA due  - amphetamine-dextroamphetamine (ADDERALL XR) 10 MG 24 hr capsule; Take 1 capsule (10 mg) by mouth daily.  - amphetamine-dextroamphetamine (ADDERALL XR) 10 MG 24 hr capsule; Take 1 capsule (10 mg) by mouth daily. Do not start before October 3, 2024.  - amphetamine-dextroamphetamine (ADDERALL XR) 10 MG 24 hr capsule; Take 1 capsule (10 mg) by mouth daily. Do not start before November 2, 2024.    Major depressive disorder, recurrent episode, moderate (H)  Stable - refilled  - venlafaxine (EFFEXOR XR) 150 MG 24 hr capsule; Take 1 capsule (150 mg) by mouth daily. With 75 mg dose  - venlafaxine (EFFEXOR XR) 75 MG 24 hr capsule; Take 1 capsule (75 mg) by mouth daily. With 150 mg dose    Elevated blood pressure reading without diagnosis of hypertension  No family history  Possibly venlafaxine effect?  Baseline labs show elevated HGB and RBC count - work up PV  - Comprehensive metabolic panel (BMP + Alb, Alk Phos, ALT, AST, Total. Bili, TP); Future  - TSH with free T4 reflex; Future  - Albumin Random Urine Quantitative with Creat Ratio; Future  - EKG 12-lead complete w/read - Clinics  - CBC with platelets and differential; Future  - Home Blood Pressure Monitor Order for DME - ONLY FOR DME  - CBC with platelets and differential  - Albumin Random Urine Quantitative with Creat Ratio  - TSH with free T4 reflex  - Comprehensive metabolic panel (BMP + Alb, Alk Phos, ALT, AST, Total. Bili, TP)    Abnormality, hemoglobin  "(H24)  - Lab Blood Morphology Pathologist Review; Future  - Erythropoietin; Future  - Erythropoietin    Need for hepatitis C screening test  deferred    Patient has been advised of split billing requirements and indicates understanding: Yes        BMI  Estimated body mass index is 33.96 kg/m  as calculated from the following:    Height as of this encounter: 1.803 m (5' 11\").    Weight as of this encounter: 110.5 kg (243 lb 8 oz).   Weight management plan: Discussed healthy diet and exercise guidelines    Counseling  Appropriate preventive services were addressed with this patient via screening, questionnaire, or discussion as appropriate for fall prevention, nutrition, physical activity, Tobacco-use cessation, social engagement, weight loss and cognition.  Checklist reviewing preventive services available has been given to the patient.  Reviewed patient's diet, addressing concerns and/or questions.   He is at risk for lack of exercise and has been provided with information to increase physical activity for the benefit of his well-being.   The patient was instructed to see the dentist every 6 months.   The patient's PHQ-9 score is consistent with mild depression. He was provided with information regarding depression.       Patient Instructions   DO NOT Start Adderall XR 10 mg daily - until we figure out blood pressure    Measure BP at home daily for the next two weeks  Labs today  EKG today  BP goals under 120/80    Check blood pressures at home   Sit for 5 min  Not within 30 minutes of caffeine  Legs and feet uncrossed  Silent while measuring  Arm resting on a table at around heart level  If elevated, stay seated and recheck again in 5 min      1.\"Eat food.  Not too much.  Mostly plants\" - Anish Pollen - see link below  - Aim for 5-7 servings of vegetables (raw vegetables - 1 serving = 1/2 cup; raw greens - 1 serving = 1 cup)   - Eat grains, but don't make them the superstar of your meal and DO make them whole " "grains  2. AVOID sugar.  There is no nutritional benefit to sugar.  3. Drink lots of water (avoid juice and soda)  4. MOVE your body daily - even if some days this means 5 minutes of movement. Walking is great for your bones and brain.  Do aim for 150 minutes per week of activity that raises your heart rate, but \"don't let the ideal get in the way of the good enough\"  5. Get good sleep (6-8 hours/night- less sleep is associated with disease/illness/obesity)   6. Smile and laugh every day - even in the face of tragedy  7. Start a meditation or mindfulness practice    CALCIUM: The average recommended intake for women is 6295-9971 mg calcium daily. It is best to get this from your diet (Milk, yogurt, and cheese, vegetables such as Chinese cabbage, kale, spinach and broccoli). If you are not eating enough calcium, then I recommend Calcium Citrate/vitD supplements daily.     Vitamin D is an essential nutritient that many [place] lack, especially in the winter. It is vital for healthy immune system functioning and can be linked to diseases such as obesity, diabetes, body aches/pain, etc. It is super safe and highly beneficial for a [place] to take a vitamin D3 2000 IU once daily during winter months. Daily vitamin D for life is recommended for anyone who has ever been found deficient.    Other things to consider: do not drive while under the influence of alcohol, do not drive while texting, always wear a seatbelt, wear a helmet when biking, wear sunscreen to help prevent skin cancer, use DEET mosquito spray when outdoors to prevent mosquito and tick bites, & avoid smoking. If you do get bit by a DEER tick, please contact my office immediately to consider lyme prophylaxis. Dental visits recommended every 6-12 months.    Anish Hook's 7 Rules for Eating  https://www.webmd.com/food-recipes/news/62458247/7-rules-for-eating#1    My typical clinic hours are as follows:  Monday 12-5 pm  Tuesday 8am-3pm  Wednesday " "7:20am-1pm  Thursday virtual appts 8:20am-12:20 pm  Friday 8-11 am  If you need an appointment urgently and do not find one available on RxAnte or with Central scheduling, please send me a RxAnte message and I will work to get you scheduled with myself or a colleague here     Clinic notes  Lab and imaging results are now available for you to view immediately on completion.  Please know that I often have not seen the result before you see results.  I will interpret and discuss the results and meaning of the results as soon as I am able to review them.   RxAnte is the best way to reach the clinic directly.  When you send a RxAnte message this will be read by our clinic RNs.  Please know that when you call the clinic number, you are not speaking to a staff member from our local clinic.  You can ask that staff to speak to a clinic staff directly if you wish.  You will be able to read my documentation (\"provider notes\") when I finish up and close your chart.  I encourage you to read through to understand your diagnosis and the plan and how we arrived there.  It is also an opportunity to make sure I fully understood your concerns.      Ben Alfaro is a 29 year old, presenting for the following:  Physical        9/3/2024     3:05 PM   Additional Questions   Roomed by Iftikhar   Accompanied by Self        Health Care Directive  Patient does not have a Health Care Directive or Living Will: Discussed advance care planning with patient; information given to patient to review.    HPI    HM -    Cancer screenings - none indicated   Chronic conditions screenings - none indicated   Immunizations - flu and covid recommended in later fall  Habits  -   Exercise - \"not enough\" - walking more with nice weather      Nutrition - should be eating more vegetables, protein intake fine     Mental health/mindfulness - stable, looking for new therapist     Alcohol/cigarettes - none   Sleep - sleeps well  Sexual health - not " currently sexually active, had STI screening with last new partner  Goals - lose weight    Got ADHD eval and diagnosed with ADHD from [facility].         9/3/2024   General Health   How would you rate your overall physical health? (!) FAIR   Feel stress (tense, anxious, or unable to sleep) To some extent      (!) STRESS CONCERN      9/3/2024   Nutrition   Three or more servings of calcium each day? (!) NO   Diet: Regular (no restrictions)   How many servings of fruit and vegetables per day? (!) 0-1   How many sweetened beverages each day? (!) 2            9/3/2024   Exercise   Days per week of moderate/strenous exercise 3 days            9/3/2024   Social Factors   Frequency of gathering with friends or relatives Twice a week   Worry food won't last until get money to buy more No   Food not last or not have enough money for food? No   Do you have housing? (Housing is defined as stable permanent housing and does not include staying ouside in a car, in a tent, in an abandoned building, in an overnight shelter, or couch-surfing.) Yes   Are you worried about losing your housing? No   Lack of transportation? No   Unable to get utilities (heat,electricity)? No            9/3/2024   Dental   Dentist two times every year? (!) NO            9/3/2024   TB Screening   Were you born outside of the [place]? No          Today's PHQ-9 Score:       9/3/2024     2:43 PM   PHQ-9 SCORE   PHQ-9 Total Score MyChart 5 (Mild depression)   PHQ-9 Total Score 5         9/3/2024   Substance Use   Alcohol more than 3/day or more than 7/wk No   Do you use any other substances recreationally? (!) CANNABIS PRODUCTS        Social History     Tobacco Use    Smoking status: Never    Smokeless tobacco: Never   Substance Use Topics    Alcohol use: No    Drug use: Yes           9/3/2024   STI Screening   New sexual partner(s) since last STI/HIV test? No            9/3/2024   Contraception/Family Planning   Questions about contraception  "or family planning No           Reviewed and updated as needed this visit by Provider                    Past Medical History:   Diagnosis Date    Depressive disorder      History reviewed. No pertinent surgical history.      Review of Systems  Constitutional, HEENT, cardiovascular, pulmonary, gi and gu systems are negative, except as otherwise noted.    BP Readings from Last 6 Encounters:   09/03/24 (!) 138/110   04/19/19 126/82   01/11/19 138/84   09/25/18 126/76   08/28/18 133/88   07/20/18 136/86     Recheck 150/100     Objective    Exam  BP (!) 138/110   Pulse 85   Temp 97.9  F (36.6  C) (Temporal)   Resp 20   Ht 1.803 m (5' 11\")   Wt 110.5 kg (243 lb 8 oz)   SpO2 96%   BMI 33.96 kg/m     Estimated body mass index is 33.96 kg/m  as calculated from the following:    Height as of this encounter: 1.803 m (5' 11\").    Weight as of this encounter: 110.5 kg (243 lb 8 oz).    Physical Exam  GENERAL: alert and no distress  EYES: Eyes grossly normal to inspection, PERRL and conjunctivae and sclerae normal  HENT: ear canals and TM's normal, nose and mouth without ulcers or lesions  NECK: no adenopathy, no asymmetry, masses, or scars  RESP: lungs clear to auscultation - no rales, rhonchi or wheezes  CV: regular rate and rhythm, normal S1 S2, no S3 or S4, no murmur, click or rub, no peripheral edema  ABDOMEN: soft, nontender, no hepatosplenomegaly, no masses and bowel sounds normal  MS: no gross musculoskeletal defects noted, no edema  SKIN: no suspicious lesions or rashes  NEURO: Normal strength and tone, mentation intact and speech normal  PSYCH: mentation appears normal, affect normal/bright  LYMPH: no cervical, supraclavicular, axillary, or inguinal adenopathy        Signed Electronically by: ZARA Brand CNP    Answers submitted by the patient for this visit:  Patient Health Questionnaire (Submitted on 9/3/2024)  If you checked off any problems, how difficult have these problems made it for you to do " your work, take care of things at home, or get along with other people?: Somewhat difficult  PHQ9 TOTAL SCORE: 5

## 2024-09-03 NOTE — PATIENT INSTRUCTIONS
"DO NOT Start Adderall XR 10 mg daily - until we figure out blood pressure    Measure BP at home daily for the next two weeks  Labs today  EKG today  BP goals under 120/80    Check blood pressures at home   Sit for 5 min  Not within 30 minutes of caffeine  Legs and feet uncrossed  Silent while measuring  Arm resting on a table at around heart level  If elevated, stay seated and recheck again in 5 min      1.\"Eat food.  Not too much.  Mostly plants\" - Anish Pollen - see link below  - Aim for 5-7 servings of vegetables (raw vegetables - 1 serving = 1/2 cup; raw greens - 1 serving = 1 cup)   - Eat grains, but don't make them the superstar of your meal and DO make them whole grains  2. AVOID sugar.  There is no nutritional benefit to sugar.  3. Drink lots of water (avoid juice and soda)  4. MOVE your body daily - even if some days this means 5 minutes of movement. Walking is great for your bones and brain.  Do aim for 150 minutes per week of activity that raises your heart rate, but \"don't let the ideal get in the way of the good enough\"  5. Get good sleep (6-8 hours/night- less sleep is associated with disease/illness/obesity)   6. Smile and laugh every day - even in the face of tragedy  7. Start a meditation or mindfulness practice    CALCIUM: The average recommended intake for women is 9851-8388 mg calcium daily. It is best to get this from your diet (Milk, yogurt, and cheese, vegetables such as Chinese cabbage, kale, spinach and broccoli). If you are not eating enough calcium, then I recommend Calcium Citrate/vitD supplements daily.     Vitamin D is an essential nutritient that many [place] lack, especially in the winter. It is vital for healthy immune system functioning and can be linked to diseases such as obesity, diabetes, body aches/pain, etc. It is super safe and highly beneficial for a [place] to take a vitamin D3 2000 IU once daily during winter months. Daily vitamin D for life is recommended for " "anyone who has ever been found deficient.    Other things to consider: do not drive while under the influence of alcohol, do not drive while texting, always wear a seatbelt, wear a helmet when biking, wear sunscreen to help prevent skin cancer, use DEET mosquito spray when outdoors to prevent mosquito and tick bites, & avoid smoking. If you do get bit by a DEER tick, please contact my office immediately to consider lyme prophylaxis. Dental visits recommended every 6-12 months.    Anish Hook's 7 Rules for Eating  https://www.Mobile Backstage/food-recipes/news/01186922/7-rules-for-eating#1    My typical clinic hours are as follows:  Monday 12-5 pm  Tuesday 8am-3pm  Wednesday 7:20am-1pm  Thursday virtual appts 8:20am-12:20 pm  Friday 8-11 am  If you need an appointment urgently and do not find one available on BeachMint or with Central scheduling, please send me a BeachMint message and I will work to get you scheduled with myself or a colleague here     Clinic notes  Lab and imaging results are now available for you to view immediately on completion.  Please know that I often have not seen the result before you see results.  I will interpret and discuss the results and meaning of the results as soon as I am able to review them.   BeachMint is the best way to reach the clinic directly.  When you send a BeachMint message this will be read by our clinic RNs.  Please know that when you call the clinic number, you are not speaking to a staff member from our local clinic.  You can ask that staff to speak to a clinic staff directly if you wish.  You will be able to read my documentation (\"provider notes\") when I finish up and close your chart.  I encourage you to read through to understand your diagnosis and the plan and how we arrived there.  It is also an opportunity to make sure I fully understood your concerns.    "

## 2024-09-03 NOTE — LETTER
[facility]  09/03/24  Patient: Dominic Workman  YOB: 1995  Medical Record Number: [id number]                                                                                  Non-Opioid Controlled Substance Agreement    This is an agreement between you and your provider regarding safe and appropriate use of controlled substances prescribed by your care team. Controlled substances are?medicines that can cause physical and mental dependence (abuse).     There are strict laws about having and using these medicines. We here at [facility] are  committed to working with you in your efforts to get better. To support you in this work, we'll help you schedule regular office appointments for medicine refills. If we must cancel or change your appointment for any reason, we'll make sure you have enough medicine to last until your next appointment.     As a Provider, I will:   Listen carefully to your concerns while treating you with respect.   Recommend a treatment plan that I believe is in your best interest and may involve therapies other than medicine.    Talk with you often about the possible benefits and the risk of harm of any medicine that we prescribe for you.  Assess the safety of this medicine and check how well it works.    Provide a plan on how to taper (discontinue or go off) using this medicine if the decision is made to stop its use.      ::  As a Patient, I understand controlled substances:     Are prescribed by my care provider to help me function or work and manage my condition(s).?  Are strong medicines and can cause serious side effects.     Need to be taken exactly as prescribed.?Combining controlled substances with certain medicines or chemicals (such as illegal drugs, alcohol, sedatives, sleeping pills, and benzodiazepines) can be dangerous or even fatal.? If I stop taking my medicines suddenly, I may have severe withdrawal symptoms.     The  risks, benefits, and side effects of these medicine(s) were explained to me. I agree that:    I will take part in other treatments as advised by my care team. This may be psychiatry or counseling, physical therapy, behavioral therapy, group treatment or a referral to specialist.    I will keep all my appointments and understand this is part of the monitoring of controlled substances.?My care team may require an office visit for EVERY controlled substance refill. If I miss appointments or don t follow instructions, my care team may stop my medicine    I will take my medicines as prescribed. I will not change the dose or schedule unless my care team tells me to. There will be no refills if I run out early.      I may be asked to come to the clinic and complete a urine drug test or complete a pill count. If I don t give a urine sample or participate in a pill count, the care team may stop my medicine.    I will only receive controlled substance prescriptions from this clinic. If I am treated by another provider, I will tell them that I am taking controlled substances and that I have a treatment agreement with this provider. I will inform my [facility] care team within one business day if I am given a prescription for any controlled substance by another healthcare provider. My [facility] care team can contact other providers and pharmacists about my use of any medicines.    It is up to me to make sure that I don't run out of my medicines on weekends or holidays.?If my care team is willing to refill my prescription without a visit, I must request refills only during office hours. Refills may take up to 3 business days to process. I will use one pharmacy to fill all my controlled substance prescriptions. I will notify the clinic about any changes to my insurance or medicine availability.    I am responsible for my prescriptions. If the medicine/prescription is lost, stolen or destroyed, it will not be  replaced.?I also agree not to share controlled substance medicines with anyone.     I am aware I should not use any illegal or recreational drugs. I agree not to drink alcohol unless my care team says I can.     If I enroll in the [place] [facility] program, I will tell my care team before my next refill.    I will tell my care team right away if I become pregnant, have a new medical problem treated outside of my regular clinic, or have a change in my medicines.     I understand that this medicine can affect my thinking, judgment and reaction time.? Alcohol and drugs affect the brain and body, which can affect the safety of my driving. Being under the influence of alcohol or drugs can affect my decision-making, behaviors, personal safety and the safety of others. Driving while impaired (DWI) can occur if a person is driving, operating or in physical control of a car, motorcycle, boat, snowmobile, ATV, motorbike, off-road vehicle or any other motor vehicle ([place] Statute 169[place].20). I understand the risk if I choose to drive or operate any vehicle or machinery.    I understand that if I do not follow any of the conditions above, my prescriptions or treatment may be stopped or changed.   I agree that my provider, clinic care team and pharmacy may work with any city, state or federal law enforcement agency that investigates the misuse, sale or other diversion of my controlled medicine. I will allow my provider to discuss my care with, or share a copy of, this agreement with any other treating provider, pharmacy or emergency room where I receive care.     I have read this agreement and have asked questions about anything I did not understand.    ________________________________________________________  Patient Signature - Dominic Workman     ___________________                   Date     ________________________________________________________  Provider Signature - ZARA Brand CNP        ___________________                   Date     ________________________________________________________  Witness Signature (required if provider not present while patient signing)          ___________________                   Date

## 2024-09-04 PROBLEM — R03.0 ELEVATED BLOOD PRESSURE READING WITHOUT DIAGNOSIS OF HYPERTENSION: Status: ACTIVE | Noted: 2024-09-04

## 2024-09-04 PROBLEM — F90.2 ATTENTION DEFICIT HYPERACTIVITY DISORDER (ADHD), COMBINED TYPE: Status: ACTIVE | Noted: 2024-09-04

## 2024-09-04 NOTE — RESULT ENCOUNTER NOTE
Dominic,    Your labs were all normal EXCEPT that you have elevated hemoglobin and red blood cell count. That with the elevated blood pressure make me want to rule out something called polycythemia vera. I've added on one of the tests (erythropoetin) to the labs that were already drawn, but I do need you to come in for a different est called a peripheral smear that needs to be drawn in a separate container. Can you please set up a lab appointment to get tht drawn?  If it is polycythemia vera, this something that is relatively easy to deal with.  If you have any questions, please feel free to contact the clinic.    JACKIE Steinberg

## 2024-09-06 LAB — EPO SERPL-ACNC: 7 MU/ML

## 2024-09-11 ENCOUNTER — TELEPHONE (OUTPATIENT)
Dept: FAMILY MEDICINE | Facility: CLINIC | Age: 29
End: 2024-09-11
Payer: MEDICAID

## 2024-09-11 NOTE — TELEPHONE ENCOUNTER
Pt states that his most recent BP was 140/95 at 9pm last night. Pt is going to call to make a lab only appointment to get his blood drawn. Does not have any questions at this time. Thanks    Susy Meyer RN  [facility]

## 2024-09-11 NOTE — TELEPHONE ENCOUNTER
Have messaged patient about labs and need for a follow-up lab drawn to evaluate the elevated hemoglobin, which can be related to the higher blood pressure he had in clinic. Can we please make sure he understood the labs results and make sure he gets into lab for the follow-up peripheral smear. I'm also curious how his out of clinic blood pressures have been. Thanks!  JACKIE Steinberg

## 2024-09-16 NOTE — TELEPHONE ENCOUNTER
Central Prior Authorization Team   Phone: [phone]    P/A demographics have been submitted to insurance, am awaiting question set.           Patient needs updated blood work. Please place orders. A courtesy refill was provided.     CMP  TSH

## 2024-09-26 ENCOUNTER — MYC MEDICAL ADVICE (OUTPATIENT)
Dept: FAMILY MEDICINE | Facility: CLINIC | Age: 29
End: 2024-09-26

## 2025-03-11 ENCOUNTER — OFFICE VISIT (OUTPATIENT)
Dept: FAMILY MEDICINE | Facility: CLINIC | Age: 30
End: 2025-03-11
Payer: COMMERCIAL

## 2025-03-11 VITALS
DIASTOLIC BLOOD PRESSURE: 91 MMHG | TEMPERATURE: 97.4 F | OXYGEN SATURATION: 95 % | BODY MASS INDEX: 33.57 KG/M2 | HEIGHT: 70 IN | HEART RATE: 72 BPM | SYSTOLIC BLOOD PRESSURE: 139 MMHG | WEIGHT: 234.5 LBS

## 2025-03-11 DIAGNOSIS — F90.2 ATTENTION DEFICIT HYPERACTIVITY DISORDER (ADHD), COMBINED TYPE: ICD-10-CM

## 2025-03-11 DIAGNOSIS — H01.134 ECZEMATOUS DERMATITIS OF UPPER EYELIDS OF BOTH EYES: ICD-10-CM

## 2025-03-11 DIAGNOSIS — Z11.59 NEED FOR HEPATITIS C SCREENING TEST: ICD-10-CM

## 2025-03-11 DIAGNOSIS — G43.009 MIGRAINE WITHOUT AURA AND WITHOUT STATUS MIGRAINOSUS, NOT INTRACTABLE: ICD-10-CM

## 2025-03-11 DIAGNOSIS — Z00.00 ROUTINE GENERAL MEDICAL EXAMINATION AT A HEALTH CARE FACILITY: Primary | ICD-10-CM

## 2025-03-11 DIAGNOSIS — F33.1 MAJOR DEPRESSIVE DISORDER, RECURRENT EPISODE, MODERATE (H): ICD-10-CM

## 2025-03-11 DIAGNOSIS — R06.83 SNORING: ICD-10-CM

## 2025-03-11 DIAGNOSIS — H01.131 ECZEMATOUS DERMATITIS OF UPPER EYELIDS OF BOTH EYES: ICD-10-CM

## 2025-03-11 DIAGNOSIS — D58.2: ICD-10-CM

## 2025-03-11 DIAGNOSIS — F41.1 GAD (GENERALIZED ANXIETY DISORDER): ICD-10-CM

## 2025-03-11 DIAGNOSIS — D58.2 ELEVATED HEMOGLOBIN: ICD-10-CM

## 2025-03-11 DIAGNOSIS — R03.0 ELEVATED BLOOD PRESSURE READING WITHOUT DIAGNOSIS OF HYPERTENSION: ICD-10-CM

## 2025-03-11 LAB
BASOPHILS # BLD AUTO: 0 10E3/UL (ref 0–0.2)
BASOPHILS NFR BLD AUTO: 1 %
EOSINOPHIL # BLD AUTO: 0.2 10E3/UL (ref 0–0.7)
EOSINOPHIL NFR BLD AUTO: 3 %
ERYTHROCYTE [DISTWIDTH] IN BLOOD BY AUTOMATED COUNT: 12.7 % (ref 10–15)
HCT VFR BLD AUTO: 52.4 % (ref 40–53)
HGB BLD-MCNC: 18.3 G/DL (ref 13.3–17.7)
IMM GRANULOCYTES # BLD: 0 10E3/UL
IMM GRANULOCYTES NFR BLD: 0 %
LYMPHOCYTES # BLD AUTO: 2.9 10E3/UL (ref 0.8–5.3)
LYMPHOCYTES NFR BLD AUTO: 33 %
MCH RBC QN AUTO: 29.6 PG (ref 26.5–33)
MCHC RBC AUTO-ENTMCNC: 34.9 G/DL (ref 31.5–36.5)
MCV RBC AUTO: 85 FL (ref 78–100)
MONOCYTES # BLD AUTO: 0.7 10E3/UL (ref 0–1.3)
MONOCYTES NFR BLD AUTO: 9 %
NEUTROPHILS # BLD AUTO: 4.7 10E3/UL (ref 1.6–8.3)
NEUTROPHILS NFR BLD AUTO: 55 %
PLATELET # BLD AUTO: 249 10E3/UL (ref 150–450)
RBC # BLD AUTO: 6.19 10E6/UL (ref 4.4–5.9)
RETICS # AUTO: 0.09 10E6/UL (ref 0.03–0.1)
RETICS/RBC NFR AUTO: 1.4 % (ref 0.5–2)
WBC # BLD AUTO: 8.6 10E3/UL (ref 4–11)

## 2025-03-11 PROCEDURE — 99214 OFFICE O/P EST MOD 30 MIN: CPT | Mod: 25 | Performed by: NURSE PRACTITIONER

## 2025-03-11 PROCEDURE — 90632 HEPA VACCINE ADULT IM: CPT | Performed by: NURSE PRACTITIONER

## 2025-03-11 PROCEDURE — 85045 AUTOMATED RETICULOCYTE COUNT: CPT | Performed by: NURSE PRACTITIONER

## 2025-03-11 PROCEDURE — 3080F DIAST BP >= 90 MM HG: CPT | Performed by: NURSE PRACTITIONER

## 2025-03-11 PROCEDURE — 3075F SYST BP GE 130 - 139MM HG: CPT | Performed by: NURSE PRACTITIONER

## 2025-03-11 PROCEDURE — 99395 PREV VISIT EST AGE 18-39: CPT | Mod: 25 | Performed by: NURSE PRACTITIONER

## 2025-03-11 PROCEDURE — 1126F AMNT PAIN NOTED NONE PRSNT: CPT | Performed by: NURSE PRACTITIONER

## 2025-03-11 PROCEDURE — 36415 COLL VENOUS BLD VENIPUNCTURE: CPT | Performed by: NURSE PRACTITIONER

## 2025-03-11 PROCEDURE — 86803 HEPATITIS C AB TEST: CPT | Performed by: NURSE PRACTITIONER

## 2025-03-11 PROCEDURE — 90471 IMMUNIZATION ADMIN: CPT | Performed by: NURSE PRACTITIONER

## 2025-03-11 PROCEDURE — 90472 IMMUNIZATION ADMIN EACH ADD: CPT | Performed by: NURSE PRACTITIONER

## 2025-03-11 PROCEDURE — 85025 COMPLETE CBC W/AUTO DIFF WBC: CPT | Performed by: NURSE PRACTITIONER

## 2025-03-11 PROCEDURE — 90715 TDAP VACCINE 7 YRS/> IM: CPT | Performed by: NURSE PRACTITIONER

## 2025-03-11 RX ORDER — VENLAFAXINE HYDROCHLORIDE 75 MG/1
75 CAPSULE, EXTENDED RELEASE ORAL DAILY
Qty: 90 CAPSULE | Refills: 1 | Status: SHIPPED | OUTPATIENT
Start: 2025-03-11

## 2025-03-11 RX ORDER — TRIAMCINOLONE ACETONIDE 5 MG/G
OINTMENT TOPICAL
Qty: 15 G | Refills: 1 | Status: SHIPPED | OUTPATIENT
Start: 2025-03-11

## 2025-03-11 RX ORDER — VENLAFAXINE HYDROCHLORIDE 150 MG/1
150 CAPSULE, EXTENDED RELEASE ORAL DAILY
Qty: 90 CAPSULE | Refills: 1 | Status: SHIPPED | OUTPATIENT
Start: 2025-03-11

## 2025-03-11 SDOH — HEALTH STABILITY: PHYSICAL HEALTH: ON AVERAGE, HOW MANY DAYS PER WEEK DO YOU ENGAGE IN MODERATE TO STRENUOUS EXERCISE (LIKE A BRISK WALK)?: 3 DAYS

## 2025-03-11 ASSESSMENT — PAIN SCALES - GENERAL: PAINLEVEL_OUTOF10: NO PAIN (0)

## 2025-03-11 ASSESSMENT — PATIENT HEALTH QUESTIONNAIRE - PHQ9
SUM OF ALL RESPONSES TO PHQ QUESTIONS 1-9: 6
SUM OF ALL RESPONSES TO PHQ QUESTIONS 1-9: 6
10. IF YOU CHECKED OFF ANY PROBLEMS, HOW DIFFICULT HAVE THESE PROBLEMS MADE IT FOR YOU TO DO YOUR WORK, TAKE CARE OF THINGS AT HOME, OR GET ALONG WITH OTHER PEOPLE: NOT DIFFICULT AT ALL

## 2025-03-11 ASSESSMENT — SOCIAL DETERMINANTS OF HEALTH (SDOH): HOW OFTEN DO YOU GET TOGETHER WITH FRIENDS OR RELATIVES?: TWICE A WEEK

## 2025-03-11 NOTE — PATIENT INSTRUCTIONS
Schedule with sleep medicine to eval for sleep apnea possibly causing your fatigue, blood pressure, mood    Advanced Care Directive resources  Consider making an advanced care directive - this is a good site for assistance and resources   [email or web address]    DASH Diet: Care Instructions  Your Care Instructions     The DASH diet is an eating plan that can help lower your blood pressure. DASH stands for Dietary Approaches to Stop Hypertension. Hypertension is high blood pressure.  The DASH diet focuses on eating foods that are high in calcium, potassium, and magnesium. These nutrients can lower blood pressure. The foods that are highest in these nutrients are fruits, vegetables, low-fat dairy products, nuts, seeds, and legumes. But taking calcium, potassium, and magnesium supplements instead of eating foods that are high in those nutrients does not have the same effect. The DASH diet also includes whole grains, fish, and poultry.  The DASH diet is one of several lifestyle changes your doctor may recommend to lower your high blood pressure. Your doctor may also want you to decrease the amount of sodium in your diet. Lowering sodium while following the DASH diet can lower blood pressure even further than just the DASH diet alone.  Follow-up care is a key part of your treatment and safety. Be sure to make and go to all appointments, and call your doctor if you are having problems. It's also a good idea to know your test results and keep a list of the medicines you take.  How can you care for yourself at home?  Following the DASH diet  Eat 4 to 5 servings of fruit each day. A serving is 1 medium-sized piece of fruit, 1/2 cup raw or canned fruit, 1/4 cup dried fruit, or 4 ounces (1/2 cup) of fruit juice. Choose fruit more often than fruit juice.  Eat 4 to 5 servings of vegetables each day. A serving is 1 cup of lettuce or raw leafy vegetables, 1/2 cup of chopped or cooked vegetables, or 4 ounces (1/2 cup) of  vegetable juice. Choose vegetables more often than vegetable juice.  Get 2 to 3 servings of low-fat and fat-free dairy each day. A serving is 8 ounces of milk, 1 cup of yogurt, or 1  ounces of cheese.  Eat 6 to 8 servings of grains each day. A serving is 1 slice of bread, 1 ounce of dry cereal, or 1/2 cup of cooked rice, pasta, or cooked cereal. Try to choose whole-grain products as much as possible.  Limit lean meat, poultry, and fish to 6 ounces or less each day. One egg counts as 1 ounce.  Eat 4 to 5 servings of nuts, seeds, and legumes (cooked dried beans, lentils, and split peas) each week. A serving is 1/3 cup of nuts, 2 tablespoons of seeds, 2 tablespoons of peanut butter, or 1/2 cup of cooked beans or peas.  Limit fats and oils to 2 to 3 servings each day. A serving is 1 teaspoon of vegetable oil or 2 tablespoons of salad dressing.  Limit sweets and added sugars to 5 servings or less a week. A serving is 1 tablespoon jelly or jam, 1/2 cup sorbet, or 1 cup of lemonade.  Eat less than 2,300 milligrams (mg) of sodium a day. If you limit your sodium to 1,500 mg a day, you can lower your blood pressure even more.  Be aware that all of these are the suggested number of servings for people who eat 1,800 to 2,000 calories a day. Your recommended number of servings may be different if you need more or fewer calories.  Tips for success  Start small. Make small changes, and stick with them. Once those changes become habit, add a few more changes.  Try some of the following:  Make it a goal to eat a fruit or vegetable at every meal and at snacks. This will make it easy to get the recommended amount of fruits and vegetables each day.  Try yogurt topped with fruit and nuts for a snack or healthy dessert.  Add lettuce, tomato, cucumber, and onion to sandwiches.  Have a variety of cut-up vegetables with a low-fat dip as an appetizer instead of chips and dip.  Sprinkle sunflower seeds or chopped almonds over salads. Or try  "adding chopped walnuts or almonds to cooked vegetables.  Try some vegetarian meals using beans and peas. Add garbanzo or kidney beans to salads. Make burritos and tacos with mashed beck beans or black beans.  Where can you learn more?  Go to https://www.[employer].net/patiented  Enter H967 in the search box to learn more about \"DASH Diet: Care Instructions.\"  Current as of: September 20, 2023  Content Version: 14.3    2024 [employer].   Care instructions adapted under license by your healthcare professional. If you have questions about a medical condition or this instruction, always ask your healthcare professional. [employer] disclaims any warranty or liability for your use of this information.     "

## 2025-03-11 NOTE — PROGRESS NOTES
"Preventive Care Visit  [facility]  ZARA Brand CNP, Nurse Practitioner - Family  Mar 11, 2025      Assessment & Plan     Problem List Items Addressed This Visit          Nervous and Auditory    Migraine without aura and without status migrainosus, not intractable - no migraines in six months    Relevant Medications    venlafaxine (EFFEXOR XR) 150 MG 24 hr capsule    venlafaxine (EFFEXOR XR) 75 MG 24 hr capsule       Hematologic    Elevated hemoglobin - asymptomatic other than elevated BP, not using testosterone or supplements    Overview     History/diagnosed - first noted 12/2024 when CBC checked for elevated BP  Normal erythropoetin, didn't return for recommended peripheral smear   Current plan  03/11/25 - peripheral smear, defer HTN treatment to results, defer ADHD treatment to results. Confirmed continued elevated hemoglobin, peripheral smear pending. Refer to hematology         Relevant Orders    CBC with platelets and differential       Behavioral    Major depressive disorder, recurrent episode, moderate (H)    Overview     History/diagnosed ?long-term  Current plan  03/11/25 - continue venlafaxine  mg, would prefer to treat HTN than stop venlafaxine due to good effect  Comorbidities  +ZOILA  +ADHD  Previous med trials  Prozac (fluoxetine) YES- trial one month 1/2017: sexual side effects and increase in migraines  Zoloft (sertraline) YES- YES- trial one month 1/2017: sexual side effects  Celexa (citalopram) YES- trial one month 2/2017: restless legs, trouble sleeping  Lexapro (escitalopram) no  Paxil (paroxetine) no  Wellbutrin (bupropion) YES- 150 mg ER --> 300 mg remember it being \"ok\" and nothing too bad of side effects. MTM notes from 8/2018 reflect that it was not entirely covering symptoms and so med switched to venlafaxine  Effexor (venlafaxine) YES- currently and at max dose  Abilify (aripiprazole) no  Lamictal (lamotrigine) no  Buspar (buspirone) " "no  Others:  no  PRNS  Vistaril/Atarax (hydroxizine):  no  Propranolol:  no  Benzodiazepine: no         Relevant Medications    venlafaxine (EFFEXOR XR) 150 MG 24 hr capsule    venlafaxine (EFFEXOR XR) 75 MG 24 hr capsule    ZOILA (generalized anxiety disorder)    Relevant Medications    venlafaxine (EFFEXOR XR) 150 MG 24 hr capsule    venlafaxine (EFFEXOR XR) 75 MG 24 hr capsule    Attention deficit hyperactivity disorder (ADHD), combined type    Overview     History/diagnosed 2024  Current plan  03/11/25 - defer treatment to evaluation of elevated hgb and BP  Medications  First trial will be vyvanse 10 mg  Comorbidities   +MDD  +ZOILA            Other    Elevated blood pressure reading without diagnosis of hypertension    Overview     History/diagnosed 12/2024  Current plan  03/11/25 - eval for elevated hgb, continue to gather home BPs for which systolic have been normal  Medications  Discussed meds options if we need to treat and would choose olmesartan 10-20 mg         Relevant Orders    Basic metabolic panel  (Ca, Cl, CO2, Creat, Gluc, K, Na, BUN)     Other Visit Diagnoses       Routine general medical examination at a health care facility    -  Primary    Snoring    - STOP BANG =4    Relevant Orders    Adult Sleep Eval & Management Referral    Eczematous dermatitis of upper eyelids of both eyes    - discussed extreme caution with triamcinolone on eyes, take cetirizine daily    Relevant Medications    triamcinolone (KENALOG) 0.5 % external ointment    Need for hepatitis C screening test    - low risk routine screening    Relevant Orders    Hepatitis C Screen Reflex to HCV RNA Quant and Genotype    Abnormality, hemoglobin    - as above               Patient has been advised of split billing requirements and indicates understanding: Yes        BMI  Estimated body mass index is 33.5 kg/m  as calculated from the following:    Height as of this encounter: 1.782 m (5' 10.16\").    Weight as of this encounter: 106.4 kg " (234 lb 8 oz).   Weight management plan: Discussed healthy diet and exercise guidelines    Counseling  Appropriate preventive services were addressed with this patient via screening, questionnaire, or discussion as appropriate for fall prevention, nutrition, physical activity, Tobacco-use cessation, social engagement, weight loss and cognition.  Checklist reviewing preventive services available has been given to the patient.  Reviewed patient's diet, addressing concerns and/or questions.   He is at risk for lack of exercise and has been provided with information to increase physical activity for the benefit of his well-being.   The patient was instructed to see the dentist every 6 months.   The patient's PHQ-9 score is consistent with mild depression. He was provided with information regarding depression.       Patient Instructions   Schedule with sleep medicine to eval for sleep apnea possibly causing your fatigue, blood pressure, mood    Advanced Care Directive resources  Consider making an advanced care directive - this is a good site for assistance and resources   [email or web address]    DASH Diet: Care Instructions  Your Care Instructions     The DASH diet is an eating plan that can help lower your blood pressure. DASH stands for Dietary Approaches to Stop Hypertension. Hypertension is high blood pressure.  The DASH diet focuses on eating foods that are high in calcium, potassium, and magnesium. These nutrients can lower blood pressure. The foods that are highest in these nutrients are fruits, vegetables, low-fat dairy products, nuts, seeds, and legumes. But taking calcium, potassium, and magnesium supplements instead of eating foods that are high in those nutrients does not have the same effect. The DASH diet also includes whole grains, fish, and poultry.  The DASH diet is one of several lifestyle changes your doctor may recommend to lower your high blood pressure. Your doctor may also want you to  decrease the amount of sodium in your diet. Lowering sodium while following the DASH diet can lower blood pressure even further than just the DASH diet alone.  Follow-up care is a key part of your treatment and safety. Be sure to make and go to all appointments, and call your doctor if you are having problems. It's also a good idea to know your test results and keep a list of the medicines you take.  How can you care for yourself at home?  Following the DASH diet  Eat 4 to 5 servings of fruit each day. A serving is 1 medium-sized piece of fruit, 1/2 cup raw or canned fruit, 1/4 cup dried fruit, or 4 ounces (1/2 cup) of fruit juice. Choose fruit more often than fruit juice.  Eat 4 to 5 servings of vegetables each day. A serving is 1 cup of lettuce or raw leafy vegetables, 1/2 cup of chopped or cooked vegetables, or 4 ounces (1/2 cup) of vegetable juice. Choose vegetables more often than vegetable juice.  Get 2 to 3 servings of low-fat and fat-free dairy each day. A serving is 8 ounces of milk, 1 cup of yogurt, or 1  ounces of cheese.  Eat 6 to 8 servings of grains each day. A serving is 1 slice of bread, 1 ounce of dry cereal, or 1/2 cup of cooked rice, pasta, or cooked cereal. Try to choose whole-grain products as much as possible.  Limit lean meat, poultry, and fish to 6 ounces or less each day. One egg counts as 1 ounce.  Eat 4 to 5 servings of nuts, seeds, and legumes (cooked dried beans, lentils, and split peas) each week. A serving is 1/3 cup of nuts, 2 tablespoons of seeds, 2 tablespoons of peanut butter, or 1/2 cup of cooked beans or peas.  Limit fats and oils to 2 to 3 servings each day. A serving is 1 teaspoon of vegetable oil or 2 tablespoons of salad dressing.  Limit sweets and added sugars to 5 servings or less a week. A serving is 1 tablespoon jelly or jam, 1/2 cup sorbet, or 1 cup of lemonade.  Eat less than 2,300 milligrams (mg) of sodium a day. If you limit your sodium to 1,500 mg a day, you can  "lower your blood pressure even more.  Be aware that all of these are the suggested number of servings for people who eat 1,800 to 2,000 calories a day. Your recommended number of servings may be different if you need more or fewer calories.  Tips for success  Start small. Make small changes, and stick with them. Once those changes become habit, add a few more changes.  Try some of the following:  Make it a goal to eat a fruit or vegetable at every meal and at snacks. This will make it easy to get the recommended amount of fruits and vegetables each day.  Try yogurt topped with fruit and nuts for a snack or healthy dessert.  Add lettuce, tomato, cucumber, and onion to sandwiches.  Have a variety of cut-up vegetables with a low-fat dip as an appetizer instead of chips and dip.  Sprinkle sunflower seeds or chopped almonds over salads. Or try adding chopped walnuts or almonds to cooked vegetables.  Try some vegetarian meals using beans and peas. Add garbanzo or kidney beans to salads. Make burritos and tacos with mashed beck beans or black beans.  Where can you learn more?  Go to https://www.[employer].net/patiented  Enter H967 in the search box to learn more about \"DASH Diet: Care Instructions.\"  Current as of: September 20, 2023  Content Version: 14.3    2024 [employer].   Care instructions adapted under license by your healthcare professional. If you have questions about a medical condition or this instruction, always ask your healthcare professional. [employer] disclaims any warranty or liability for your use of this information.       The longitudinal plan of care for the diagnosis(es)/condition(s) as documented were addressed during this visit. 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 No sexual side effects. Depression and anxiety symptoms well controlled. In process of finding therapist currently    Elevated blood pressure - got a cuff for home. Home BPs 129/unsure, but believes below 90. Saw reduction cutting out energy drinks and sugar pops. Lost 15 lbs.  Has had headache with intercourse and orgasm twice in the past six months. Has had no repeat of this in the past six months    BP Readings from Last 6 Encounters:   03/11/25 (!) 139/91   09/03/24 (!) 138/110   04/19/19 126/82   01/11/19 138/84   09/25/18 126/76   08/28/18 133/88     Eczema - both eyelids, itchy,     Migraines - hasn't had any migraines in the past couple months. Not taking Emgality. Hasn't needed rizatriptan and doesn't feel he needs it on hand. Uses excedrin.    Advance Care Planning  Patient does not have a Health Care Directive: Discussed advance care planning with patient; information given to patient to review.      3/11/2025   General Health   How would you rate your overall physical health? (!) FAIR   Feel stress (tense, anxious, or unable to sleep) Only a little   (!) STRESS CONCERN      3/11/2025   Nutrition   Three or more servings of calcium each day? (!) NO   Diet: Regular (no restrictions)   How many servings of fruit and vegetables per day? (!) 0-1    How many sweetened beverages each day? 0-1         3/11/2025   Exercise   Days per week of moderate/strenous exercise 3 days         3/11/2025   Social Factors   Frequency of gathering with friends or relatives Twice a week   Worry food won't last until get money to buy more No   Food not last or not have enough money for food? No   Do you have housing? (Housing is defined as stable permanent housing and does not include staying ouside in a car, in a tent, in an abandoned building, in an overnight shelter, or couch-surfing.) Yes   Are you worried about losing your housing? No   Lack of transportation? No   Unable to get utilities (heat,electricity)? No         3/11/2025   Dental   Dentist two times every year? (!) NO           9/3/2024   TB Screening   Were you born outside of the [place]? No         Today's PHQ-9 Score:       3/11/2025    12:36 PM   PHQ-9 SCORE   PHQ-9 Total Score MyChart 6 (Mild depression)   PHQ-9 Total Score 6        Patient-reported         3/11/2025   Substance Use   Alcohol more than 3/day or more than 7/wk No   Do you use any other substances recreationally? (!) CANNABIS PRODUCTS     Social History     Tobacco Use    Smoking status: Never    Smokeless tobacco: Never   Vaping Use    Vaping status: Never Used   Substance Use Topics    Alcohol use: No    Drug use: Yes           3/11/2025   STI Screening   New sexual partner(s) since last STI/HIV test? No         3/11/2025   Contraception/Family Planning   Questions about contraception or family planning No        Reviewed and updated as needed this visit by Provider   Tobacco    Problems  Med Hx  Surg Hx  Fam Hx  Soc Hx Sexual Activity          History reviewed. No pertinent past medical history.  History reviewed. No pertinent surgical history.      Review of Systems  Constitutional, neuro, ENT, endocrine, pulmonary, cardiac, gastrointestinal, genitourinary, musculoskeletal, integument and psychiatric systems are negative, except as  "otherwise noted.     Objective    Exam  BP (!) 139/91 (BP Location: Left arm, Patient Position: Sitting, Cuff Size: Adult Large)   Pulse 72   Temp 97.4  F (36.3  C) (Temporal)   Ht 1.782 m (5' 10.16\")   Wt 106.4 kg (234 lb 8 oz)   SpO2 95%   BMI 33.50 kg/m     Estimated body mass index is 33.5 kg/m  as calculated from the following:    Height as of this encounter: 1.782 m (5' 10.16\").    Weight as of this encounter: 106.4 kg (234 lb 8 oz).    Physical Exam  GENERAL: alert and no distress  EYES: Eyes grossly normal to inspection, PERRL and conjunctivae and sclerae normal  HENT: ear canals and TM's normal, nose and mouth without ulcers or lesions; patchy erythematous dryness both eyelids  NECK: no adenopathy, no asymmetry, masses, or scars  RESP: lungs clear to auscultation - no rales, rhonchi or wheezes  CV: regular rate and rhythm, normal S1 S2, no S3 or S4, no murmur, click or rub, no peripheral edema  ABDOMEN: soft, nontender, no hepatosplenomegaly, no masses and bowel sounds normal   (male): normal male genitalia without lesions or urethral discharge, no hernia  MS: no gross musculoskeletal defects noted, no edema  SKIN: no suspicious lesions or rashes  NEURO: Normal strength and tone, mentation intact and speech normal  PSYCH: mentation appears normal, affect normal/bright        Signed Electronically by: ZARA Brand CNP    "

## 2025-03-12 ENCOUNTER — PATIENT OUTREACH (OUTPATIENT)
Dept: ONCOLOGY | Facility: CLINIC | Age: 30
End: 2025-03-12
Payer: COMMERCIAL

## 2025-03-12 LAB — HCV AB SERPL QL IA: NONREACTIVE

## 2025-03-12 NOTE — RESULT ENCOUNTER NOTE
Dominic,    I don't have all the lab results, but I do have the test that confirms the elevated hemoglobin. I put in a referral for you to see hematology and I recommend you see them to determine cause. I think this will end up being a faster route to knowing what is the cause and get us to being able to make medication additions for you. You should get a call to schedule this, but the number is also available in your MyChart to be able to initiate the scheduling.    Could you send me a couple home blood pressure readings from this week?      If you have any questions, please feel free to contact the clinic.    JACKIE Steinberg

## 2025-03-12 NOTE — PROGRESS NOTES
New Patient Oncology Nurse Navigator Note     Referral Received: 03/12/25      Referring provider: Julia Maurer APRN CNP     Referring Clinic/Organization: [facility]     Referred to: Benign Hematology    Requested provider (if applicable): First available - did not specify      Evaluation for :   Diagnosis   D58.2 (ICD-10-CM) - Elevated hemoglobin      Clinical History (per Nurse review of records provided):        Elevated hemoglobin X2, normal EPO, peripheral smear pending; PE findings include hypertension      Latest Reference Range & Units 09/03/24 16:54 03/11/25 14:07   WBC 4.0 - 11.0 10e3/uL 7.7 8.6   Hemoglobin 13.3 - 17.7 g/dL 17.8 (H) 18.3 (H)   Hematocrit 40.0 - 53.0 % 51.2 52.4   Platelet Count 150 - 450 10e3/uL 272 249   RBC Count 4.40 - 5.90 10e6/uL 5.95 (H) 6.19 (H)   MCV 78 - 100 fL 86 85   MCH 26.5 - 33.0 pg 29.9 29.6   MCHC 31.5 - 36.5 g/dL 34.8 34.9   RDW 10.0 - 15.0 % 12.8 12.7   % Neutrophils % 49 55   % Lymphocytes % 42 33   % Monocytes % 7 9   % Eosinophils % 2 3   % Basophils % 1 1   Absolute Basophils 0.0 - 0.2 10e3/uL 0.0 0.0   Absolute Eosinophils 0.0 - 0.7 10e3/uL 0.1 0.2   Absolute Immature Granulocytes <=0.4 10e3/uL 0.0 0.0   Absolute Lymphocytes 0.8 - 5.3 10e3/uL 3.2 2.9   Absolute Monocytes 0.0 - 1.3 10e3/uL 0.6 0.7   % Immature Granulocytes % 0 0   Absolute Neutrophils 1.6 - 8.3 10e3/uL 3.8 4.7   % Retic 0.5 - 2.0 %  1.4   Absolute Retic 0.025 - 0.095 10e6/uL  0.086   (H): Data is abnormally high    Records Location: [facility]     Additional testing needed prior to consult:     ?    Referral updates and Plan:     03/12/2025 10:27 AM -  Referral received and reviewed. Called pt at this time, introduced my role as nurse navigator with [facility] Hematology/Oncology dept and that we have recd the referral for dx of elevated hemoglobin from Julia Maurer.  Pt confirms they are aware of the referral and ready to schedule  Provided contact information if future questions arise.      -Transferred pt to NPS line [phone] to schedule appt per scheduling instructions.    DIANA MartinezN, RN  Hematology/Oncology Nurse Navigator  [facility]  [phone] / [phone]

## 2025-03-12 NOTE — TELEPHONE ENCOUNTER
RECORDS STATUS - ALL OTHER DIAGNOSIS      RECORDS RECEIVED FROM: [facility]   NOTES STATUS DETAILS   OFFICE NOTE from referring provider Epic 3/11/2025 - ZARA Forbes CNP   MEDICATION LIST [facility]    LABS     PATHOLOGY REPORTS     ANYTHING RELATED TO DIAGNOSIS Epic 3/11/2025

## 2025-03-13 LAB
ANION GAP SERPL CALCULATED.3IONS-SCNC: 11 MMOL/L (ref 7–15)
BUN SERPL-MCNC: 15.8 MG/DL (ref 6–20)
CALCIUM SERPL-MCNC: NORMAL MG/DL
CHLORIDE SERPL-SCNC: 105 MMOL/L (ref 98–107)
CREAT SERPL-MCNC: 1.15 MG/DL (ref 0.67–1.17)
EGFRCR SERPLBLD CKD-EPI 2021: 88 ML/MIN/1.73M2
GLUCOSE SERPL-MCNC: 92 MG/DL (ref 70–99)
HCO3 SERPL-SCNC: 25 MMOL/L (ref 22–29)
PATH REPORT.COMMENTS IMP SPEC: NORMAL
PATH REPORT.COMMENTS IMP SPEC: NORMAL
PATH REPORT.FINAL DX SPEC: NORMAL
PATH REPORT.MICROSCOPIC SPEC OTHER STN: NORMAL
PATH REPORT.MICROSCOPIC SPEC OTHER STN: NORMAL
PATH REPORT.RELEVANT HX SPEC: NORMAL
POTASSIUM SERPL-SCNC: 4.3 MMOL/L (ref 3.4–5.3)
SODIUM SERPL-SCNC: 141 MMOL/L (ref 135–145)

## 2025-03-13 NOTE — RESULT ENCOUNTER NOTE
Dominic,    The kidney labs look normal again. Just waiting on the peripheral smear still.  If you have any questions, please feel free to contact the clinic.    JACKIE Steinberg

## 2025-03-15 LAB
EPO SERPL-ACNC: 11 MU/ML
MISCELLANEOUS TEST 1 (ARUP): NORMAL

## 2025-05-08 ENCOUNTER — PRE VISIT (OUTPATIENT)
Dept: ONCOLOGY | Facility: HOSPITAL | Age: 30
End: 2025-05-08
Payer: COMMERCIAL

## 2025-05-08 ENCOUNTER — ONCOLOGY VISIT (OUTPATIENT)
Dept: ONCOLOGY | Facility: HOSPITAL | Age: 30
End: 2025-05-08
Attending: NURSE PRACTITIONER
Payer: COMMERCIAL

## 2025-05-08 VITALS
RESPIRATION RATE: 17 BRPM | SYSTOLIC BLOOD PRESSURE: 138 MMHG | WEIGHT: 237 LBS | OXYGEN SATURATION: 99 % | HEIGHT: 70 IN | DIASTOLIC BLOOD PRESSURE: 89 MMHG | BODY MASS INDEX: 33.93 KG/M2 | HEART RATE: 76 BPM

## 2025-05-08 DIAGNOSIS — D75.1 ERYTHROCYTOSIS: Primary | ICD-10-CM

## 2025-05-08 DIAGNOSIS — D58.2 ELEVATED HEMOGLOBIN: ICD-10-CM

## 2025-05-08 PROCEDURE — G0463 HOSPITAL OUTPT CLINIC VISIT: HCPCS | Performed by: INTERNAL MEDICINE

## 2025-05-08 PROCEDURE — G2211 COMPLEX E/M VISIT ADD ON: HCPCS | Performed by: INTERNAL MEDICINE

## 2025-05-08 PROCEDURE — 99204 OFFICE O/P NEW MOD 45 MIN: CPT | Performed by: INTERNAL MEDICINE

## 2025-05-08 ASSESSMENT — PAIN SCALES - GENERAL: PAINLEVEL_OUTOF10: NO PAIN (0)

## 2025-05-08 NOTE — PROGRESS NOTES
"Oncology Rooming Note    May 8, 2025 2:17 PM   Dominic Workman is a 30 year old male who presents for:    Chief Complaint   Patient presents with    New Patient    Hematology     Elevated hemoglobin     Initial Vitals: /89 (BP Location: Left arm, Patient Position: Sitting, Cuff Size: Adult Regular)   Pulse 76   Resp 17   Ht 1.782 m (5' 10.16\")   Wt 107.5 kg (237 lb)   SpO2 99%   BMI 33.85 kg/m   Estimated body mass index is 33.85 kg/m  as calculated from the following:    Height as of this encounter: 1.782 m (5' 10.16\").    Weight as of this encounter: 107.5 kg (237 lb). Body surface area is 2.31 meters squared.  No Pain (0) Comment: Data Unavailable   No LMP for male patient.  Allergies reviewed: Yes  Medications reviewed: Yes    Medications: Medication refills not needed today.  Pharmacy name entered into [facility]:    [place], [place] - [address] [id number] - [place], [place] - [address] [place] [address] [id number] - [place], [place] - [address] AT [facility] [address]    Frailty Screening:   Is the patient here for a new oncology consult visit in cancer care? 2. No    PHQ9:  Did this patient require a PHQ9?: No    Clinical concerns-   New patient consult.       Sarai García MA            "

## 2025-05-08 NOTE — PROGRESS NOTES
[facility] Hematology and Oncology Consult Note    Patient: Dominic Workman  MRN: [id number]  Date of Service: May 8, 2025       Reason for Visit    Chief Complaint   Patient presents with    New Patient    Hematology     Elevated hemoglobin         Assessment/Plan    ECOG Performance 0 - Independent    Assessment & Plan  Assessment  Secondary polycythemia:  Reviewed the clinical course and labs in detail.  - Elevated hemoglobin and red blood cell count with normal erythropoietin level suggest secondary polycythemia vera.  - Dehydration is considered a contributing factor, as indicated by the patient's low water intake.  - The erythropoietin level is within the normal range, suggesting that the bone marrow is not overproducing cells independently.  - Further evaluation is needed to determine if the elevated red blood cell count is a compensatory response to another condition.    Plan  - Increase water intake to at least 64 ounces per day to address potential dehydration contributing to elevated hemoglobin levels.  - Replace or reduce soda consumption with water to improve hydration status.  - Schedule lab work for next week to reassess blood levels and determine if changes occur with increased hydration.  - Consider genetic testing if lab results do not change after increased water intake.  - Communicate through [employer] for follow-up and contact if no response after blood work is completed.     Encounter Diagnoses:    Problem List Items Addressed This Visit       Elevated hemoglobin     Other Visit Diagnoses         Erythrocytosis    -  Primary    Relevant Orders    CBC with Platelets & Differential (Completed)            ______________________________________________________________________________    History    Mr. Dominic Workman is a very pleasant 30 year old male presented today for further evaluation of elevated red cells.    Dominic reports being concerned about secondary polycythemia vera following  recent blood work. His primary care physician initiated testing due to high blood pressure, which revealed elevated hemoglobin and red blood cell count. He does not have a chronic lung disease and has not been diagnosed with sleep apnea, although he has heard that he snores occasionally. He has not undergone a sleep study yet.    He denies using hormone supplements or testosterone, with the exception of a steroid cream for his eye. There is no known family history of blood diseases. In his past medical history, he experienced migraines during his younger years but has no history of pneumonia or other chronic lung conditions.    Dominic does not smoke cigarettes but does use marijuana. He acknowledges that he likely does not drink enough water, estimating his intake to be less than one cup per day, and instead consumes a significant amount of diet soda. He has donated blood in the past, but not recently. He works at a dispensary.     Review of systems.  Apart from describing in history, the remainder of comprehensive ROS was negative.    Past History    No past medical history on file.  No past surgical history on file.  Family History   Problem Relation Age of Onset    Depression Mother     Alcoholism Mother     Substance Abuse Mother     No Known Problems Father     Depression Sister     Anxiety Disorder Sister     Dementia Maternal Grandmother     Breast Cancer No family hx of     Colon Cancer No family hx of     Skin Cancer No family hx of      Social History     Socioeconomic History    Marital status: Single   Tobacco Use    Smoking status: Never    Smokeless tobacco: Never   Vaping Use    Vaping status: Never Used   Substance and Sexual Activity    Alcohol use: No    Drug use: Yes    Sexual activity: Not Currently     Partners: Female     Birth control/protection: Condom     Social Drivers of Health     Financial Resource Strain: Low Risk  (3/11/2025)    Financial Resource Strain     Within the past 12 months,  "have you or your family members you live with been unable to get utilities (heat, electricity) when it was really needed?: No   Food Insecurity: Low Risk  (3/11/2025)    Food Insecurity     Within the past 12 months, did you worry that your food would run out before you got money to buy more?: No     Within the past 12 months, did the food you bought just not last and you didn t have money to get more?: No   Transportation Needs: Low Risk  (3/11/2025)    Transportation Needs     Within the past 12 months, has lack of transportation kept you from medical appointments, getting your medicines, non-medical meetings or appointments, work, or from getting things that you need?: No   Physical Activity: Unknown (3/11/2025)    Exercise Vital Sign     Days of Exercise per Week: 3 days   Stress: No Stress Concern Present (3/11/2025)    [employer] [place] [employer] - Occupational Stress Questionnaire     Feeling of Stress : Only a little   Social Connections: Unknown (3/11/2025)    Social Connection and Isolation Panel [NHANES]     Frequency of Social Gatherings with Friends and Family: Twice a week   Interpersonal Safety: Low Risk  (3/11/2025)    Interpersonal Safety     Do you feel physically and emotionally safe where you currently live?: Yes     Within the past 12 months, have you been hit, slapped, kicked or otherwise physically hurt by someone?: No     Within the past 12 months, have you been humiliated or emotionally abused in other ways by your partner or ex-partner?: No   Housing Stability: Low Risk  (3/11/2025)    Housing Stability     Do you have housing? : Yes     Are you worried about losing your housing?: No       Allergies    No Known Allergies    Physical Exam    /89 (BP Location: Left arm, Patient Position: Sitting, Cuff Size: Adult Regular)   Pulse 76   Resp 17   Ht 1.782 m (5' 10.16\")   Wt 107.5 kg (237 lb)   SpO2 99%   BMI 33.85 kg/m      General: alert, awake, not in acute " distress  HEENT: Head: Normal, normocephalic, atraumatic.  Eye: Normal external eye, conjunctiva, lids cornea, ANABELLA.  Nose: Normal external nose, mucus membranes and septum.  Pharynx: Normal buccal mucosa. Normal pharynx.  Neck / Thyroid: Supple, no masses, nodes, nodules or enlargement.  Lymphatics: No abnormally enlarged lymph nodes.  Abdomen: abdomen is soft without significant tenderness, masses, organomegaly or guarding  Extremities: normal strength, tone, and muscle mass  Skin: normal. no rash or abnormalities  CNS: non focal.    Lab Results    Recent Results (from the past week)   CBC with platelets and differential   Result Value Ref Range    WBC Count 7.5 4.0 - 11.0 10e3/uL    RBC Count 5.67 4.40 - 5.90 10e6/uL    Hemoglobin 17.0 13.3 - 17.7 g/dL    Hematocrit 47.3 40.0 - 53.0 %    MCV 83 78 - 100 fL    MCH 30.0 26.5 - 33.0 pg    MCHC 35.9 31.5 - 36.5 g/dL    RDW 13.0 10.0 - 15.0 %    Platelet Count 259 150 - 450 10e3/uL    % Neutrophils 49 %    % Lymphocytes 41 %    % Monocytes 7 %    % Eosinophils 3 %    % Basophils 1 %    % Immature Granulocytes 0 %    NRBCs per 100 WBC 0 <1 /100    Absolute Neutrophils 3.6 1.6 - 8.3 10e3/uL    Absolute Lymphocytes 3.1 0.8 - 5.3 10e3/uL    Absolute Monocytes 0.5 0.0 - 1.3 10e3/uL    Absolute Eosinophils 0.2 0.0 - 0.7 10e3/uL    Absolute Basophils 0.0 0.0 - 0.2 10e3/uL    Absolute Immature Granulocytes 0.0 <=0.4 10e3/uL    Absolute NRBCs 0.0 10e3/uL       Imaging Results    No results found.    The longitudinal plan of care for the diagnosis(es)/condition(s) as documented were addressed during this visit. Due to the added complexity in care, I will continue to support Dominic in the subsequent management and with ongoing continuity of care.     45 minutes spent by me on the date of the encounter doing chart review, history and exam, documentation and further activities as noted above.    Consent was obtained from the patient to use an AI documentation tool in the creation  of this note.    Signed by: Brissa Faust MD

## 2025-05-08 NOTE — LETTER
"5/8/2025      Dominic Workman  [address] [place] [address]      Dear Colleague,    Thank you for referring your patient, Dominic Workman, to the [facility]. Please see a copy of my visit note below.    Oncology Rooming Note    May 8, 2025 2:17 PM   Dominic Workman is a 30 year old male who presents for:    Chief Complaint   Patient presents with     New Patient     Hematology     Elevated hemoglobin     Initial Vitals: /89 (BP Location: Left arm, Patient Position: Sitting, Cuff Size: Adult Regular)   Pulse 76   Resp 17   Ht 1.782 m (5' 10.16\")   Wt 107.5 kg (237 lb)   SpO2 99%   BMI 33.85 kg/m   Estimated body mass index is 33.85 kg/m  as calculated from the following:    Height as of this encounter: 1.782 m (5' 10.16\").    Weight as of this encounter: 107.5 kg (237 lb). Body surface area is 2.31 meters squared.  No Pain (0) Comment: Data Unavailable   No LMP for male patient.  Allergies reviewed: Yes  Medications reviewed: Yes    Medications: Medication refills not needed today.  Pharmacy name entered into [facility]:    [place], [place] - [address] [facility] - [address] [id number] - [place], [place] - [address] [facility] [address]    Frailty Screening:   Is the patient here for a new oncology consult visit in cancer care? 2. No    PHQ9:  Did this patient require a PHQ9?: No    Clinical concerns-   New patient consult.       Sarai García MA              [facility] Hematology and Oncology Consult Note    Patient: Dominic Workman  MRN: [id number]  Date of Service: May 8, 2025       Reason for Visit    Chief Complaint   Patient presents with     New Patient     Hematology     Elevated hemoglobin         Assessment/Plan    ECOG Performance 0 - Independent    Assessment & Plan  Assessment  Secondary polycythemia:  Reviewed the clinical course and labs in " detail.  - Elevated hemoglobin and red blood cell count with normal erythropoietin level suggest secondary polycythemia vera.  - Dehydration is considered a contributing factor, as indicated by the patient's low water intake.  - The erythropoietin level is within the normal range, suggesting that the bone marrow is not overproducing cells independently.  - Further evaluation is needed to determine if the elevated red blood cell count is a compensatory response to another condition.    Plan  - Increase water intake to at least 64 ounces per day to address potential dehydration contributing to elevated hemoglobin levels.  - Replace or reduce soda consumption with water to improve hydration status.  - Schedule lab work for next week to reassess blood levels and determine if changes occur with increased hydration.  - Consider genetic testing if lab results do not change after increased water intake.  - Communicate through [facility] for follow-up and contact if no response after blood work is completed.     Encounter Diagnoses:    Problem List Items Addressed This Visit       Elevated hemoglobin     Other Visit Diagnoses         Erythrocytosis    -  Primary    Relevant Orders    CBC with Platelets & Differential (Completed)            ______________________________________________________________________________    History    Mr. Dominic Workman is a very pleasant 30 year old male presented today for further evaluation of elevated red cells.    Dominic reports being concerned about secondary polycythemia vera following recent blood work. His primary care physician initiated testing due to high blood pressure, which revealed elevated hemoglobin and red blood cell count. He does not have a chronic lung disease and has not been diagnosed with sleep apnea, although he has heard that he snores occasionally. He has not undergone a sleep study yet.    He denies using hormone supplements or testosterone, with the exception of a  steroid cream for his eye. There is no known family history of blood diseases. In his past medical history, he experienced migraines during his younger years but has no history of pneumonia or other chronic lung conditions.    Dominic does not smoke cigarettes but does use marijuana. He acknowledges that he likely does not drink enough water, estimating his intake to be less than one cup per day, and instead consumes a significant amount of diet soda. He has donated blood in the past, but not recently. He works at a dispensary.     Review of systems.  Apart from describing in history, the remainder of comprehensive ROS was negative.    Past History    No past medical history on file.  No past surgical history on file.  Family History   Problem Relation Age of Onset     Depression Mother      Alcoholism Mother      Substance Abuse Mother      No Known Problems Father      Depression Sister      Anxiety Disorder Sister      Dementia Maternal Grandmother      Breast Cancer No family hx of      Colon Cancer No family hx of      Skin Cancer No family hx of      Social History     Socioeconomic History     Marital status: Single   Tobacco Use     Smoking status: Never     Smokeless tobacco: Never   Vaping Use     Vaping status: Never Used   Substance and Sexual Activity     Alcohol use: No     Drug use: Yes     Sexual activity: Not Currently     Partners: Female     Birth control/protection: Condom     Social Drivers of Health     Financial Resource Strain: Low Risk  (3/11/2025)    Financial Resource Strain      Within the past 12 months, have you or your family members you live with been unable to get utilities (heat, electricity) when it was really needed?: No   Food Insecurity: Low Risk  (3/11/2025)    Food Insecurity      Within the past 12 months, did you worry that your food would run out before you got money to buy more?: No      Within the past 12 months, did the food you bought just not last and you didn t have  "money to get more?: No   Transportation Needs: Low Risk  (3/11/2025)    Transportation Needs      Within the past 12 months, has lack of transportation kept you from medical appointments, getting your medicines, non-medical meetings or appointments, work, or from getting things that you need?: No   Physical Activity: Unknown (3/11/2025)    Exercise Vital Sign      Days of Exercise per Week: 3 days   Stress: No Stress Concern Present (3/11/2025)    [employer] [place] [place] [employer] - Occupational Stress Questionnaire      Feeling of Stress : Only a little   Social Connections: Unknown (3/11/2025)    Social Connection and Isolation Panel [NHANES]      Frequency of Social Gatherings with Friends and Family: Twice a week   Interpersonal Safety: Low Risk  (3/11/2025)    Interpersonal Safety      Do you feel physically and emotionally safe where you currently live?: Yes      Within the past 12 months, have you been hit, slapped, kicked or otherwise physically hurt by someone?: No      Within the past 12 months, have you been humiliated or emotionally abused in other ways by your partner or ex-partner?: No   Housing Stability: Low Risk  (3/11/2025)    Housing Stability      Do you have housing? : Yes      Are you worried about losing your housing?: No       Allergies    No Known Allergies    Physical Exam    /89 (BP Location: Left arm, Patient Position: Sitting, Cuff Size: Adult Regular)   Pulse 76   Resp 17   Ht 1.782 m (5' 10.16\")   Wt 107.5 kg (237 lb)   SpO2 99%   BMI 33.85 kg/m      General: alert, awake, not in acute distress  HEENT: Head: Normal, normocephalic, atraumatic.  Eye: Normal external eye, conjunctiva, lids cornea, ANABELLA.  Nose: Normal external nose, mucus membranes and septum.  Pharynx: Normal buccal mucosa. Normal pharynx.  Neck / Thyroid: Supple, no masses, nodes, nodules or enlargement.  Lymphatics: No abnormally enlarged lymph nodes.  Abdomen: abdomen is soft without significant " tenderness, masses, organomegaly or guarding  Extremities: normal strength, tone, and muscle mass  Skin: normal. no rash or abnormalities  CNS: non focal.    Lab Results    Recent Results (from the past week)   CBC with platelets and differential   Result Value Ref Range    WBC Count 7.5 4.0 - 11.0 10e3/uL    RBC Count 5.67 4.40 - 5.90 10e6/uL    Hemoglobin 17.0 13.3 - 17.7 g/dL    Hematocrit 47.3 40.0 - 53.0 %    MCV 83 78 - 100 fL    MCH 30.0 26.5 - 33.0 pg    MCHC 35.9 31.5 - 36.5 g/dL    RDW 13.0 10.0 - 15.0 %    Platelet Count 259 150 - 450 10e3/uL    % Neutrophils 49 %    % Lymphocytes 41 %    % Monocytes 7 %    % Eosinophils 3 %    % Basophils 1 %    % Immature Granulocytes 0 %    NRBCs per 100 WBC 0 <1 /100    Absolute Neutrophils 3.6 1.6 - 8.3 10e3/uL    Absolute Lymphocytes 3.1 0.8 - 5.3 10e3/uL    Absolute Monocytes 0.5 0.0 - 1.3 10e3/uL    Absolute Eosinophils 0.2 0.0 - 0.7 10e3/uL    Absolute Basophils 0.0 0.0 - 0.2 10e3/uL    Absolute Immature Granulocytes 0.0 <=0.4 10e3/uL    Absolute NRBCs 0.0 10e3/uL       Imaging Results    No results found.    The longitudinal plan of care for the diagnosis(es)/condition(s) as documented were addressed during this visit. Due to the added complexity in care, I will continue to support Dominic in the subsequent management and with ongoing continuity of care.     45 minutes spent by me on the date of the encounter doing chart review, history and exam, documentation and further activities as noted above.    Consent was obtained from the patient to use an AI documentation tool in the creation of this note.    Signed by: Brissa Faust MD       Again, thank you for allowing me to participate in the care of your patient.        Sincerely,        Brissa Faust MD    Electronically signed

## 2025-05-15 ENCOUNTER — LAB (OUTPATIENT)
Dept: INFUSION THERAPY | Facility: HOSPITAL | Age: 30
End: 2025-05-15
Attending: INTERNAL MEDICINE
Payer: COMMERCIAL

## 2025-05-15 DIAGNOSIS — D75.1 ERYTHROCYTOSIS: ICD-10-CM

## 2025-05-15 LAB
BASOPHILS # BLD AUTO: 0 10E3/UL (ref 0–0.2)
BASOPHILS NFR BLD AUTO: 1 %
EOSINOPHIL # BLD AUTO: 0.2 10E3/UL (ref 0–0.7)
EOSINOPHIL NFR BLD AUTO: 3 %
ERYTHROCYTE [DISTWIDTH] IN BLOOD BY AUTOMATED COUNT: 13 % (ref 10–15)
HCT VFR BLD AUTO: 47.3 % (ref 40–53)
HGB BLD-MCNC: 17 G/DL (ref 13.3–17.7)
IMM GRANULOCYTES # BLD: 0 10E3/UL
IMM GRANULOCYTES NFR BLD: 0 %
LYMPHOCYTES # BLD AUTO: 3.1 10E3/UL (ref 0.8–5.3)
LYMPHOCYTES NFR BLD AUTO: 41 %
MCH RBC QN AUTO: 30 PG (ref 26.5–33)
MCHC RBC AUTO-ENTMCNC: 35.9 G/DL (ref 31.5–36.5)
MCV RBC AUTO: 83 FL (ref 78–100)
MONOCYTES # BLD AUTO: 0.5 10E3/UL (ref 0–1.3)
MONOCYTES NFR BLD AUTO: 7 %
NEUTROPHILS # BLD AUTO: 3.6 10E3/UL (ref 1.6–8.3)
NEUTROPHILS NFR BLD AUTO: 49 %
NRBC # BLD AUTO: 0 10E3/UL
NRBC BLD AUTO-RTO: 0 /100
PLATELET # BLD AUTO: 259 10E3/UL (ref 150–450)
RBC # BLD AUTO: 5.67 10E6/UL (ref 4.4–5.9)
WBC # BLD AUTO: 7.5 10E3/UL (ref 4–11)

## 2025-05-15 PROCEDURE — 36415 COLL VENOUS BLD VENIPUNCTURE: CPT

## 2025-05-15 PROCEDURE — 85025 COMPLETE CBC W/AUTO DIFF WBC: CPT

## 2025-06-23 ENCOUNTER — OFFICE VISIT (OUTPATIENT)
Dept: FAMILY MEDICINE | Facility: CLINIC | Age: 30
End: 2025-06-23
Payer: COMMERCIAL

## 2025-06-23 VITALS
HEIGHT: 70 IN | TEMPERATURE: 97.1 F | WEIGHT: 236 LBS | SYSTOLIC BLOOD PRESSURE: 138 MMHG | HEART RATE: 73 BPM | OXYGEN SATURATION: 96 % | DIASTOLIC BLOOD PRESSURE: 88 MMHG | BODY MASS INDEX: 33.79 KG/M2 | RESPIRATION RATE: 18 BRPM

## 2025-06-23 DIAGNOSIS — F90.2 ATTENTION DEFICIT HYPERACTIVITY DISORDER (ADHD), COMBINED TYPE: ICD-10-CM

## 2025-06-23 DIAGNOSIS — E66.811 CLASS 1 OBESITY WITH BODY MASS INDEX (BMI) OF 33.0 TO 33.9 IN ADULT, UNSPECIFIED OBESITY TYPE, UNSPECIFIED WHETHER SERIOUS COMORBIDITY PRESENT: ICD-10-CM

## 2025-06-23 DIAGNOSIS — D58.2 ELEVATED HEMOGLOBIN: ICD-10-CM

## 2025-06-23 DIAGNOSIS — F41.1 GAD (GENERALIZED ANXIETY DISORDER): ICD-10-CM

## 2025-06-23 DIAGNOSIS — R03.0 ELEVATED BLOOD PRESSURE READING WITHOUT DIAGNOSIS OF HYPERTENSION: Primary | ICD-10-CM

## 2025-06-23 DIAGNOSIS — F33.1 MAJOR DEPRESSIVE DISORDER, RECURRENT EPISODE, MODERATE (H): ICD-10-CM

## 2025-06-23 PROCEDURE — G2211 COMPLEX E/M VISIT ADD ON: HCPCS | Performed by: NURSE PRACTITIONER

## 2025-06-23 PROCEDURE — 1126F AMNT PAIN NOTED NONE PRSNT: CPT | Performed by: NURSE PRACTITIONER

## 2025-06-23 PROCEDURE — 3079F DIAST BP 80-89 MM HG: CPT | Performed by: NURSE PRACTITIONER

## 2025-06-23 PROCEDURE — 99215 OFFICE O/P EST HI 40 MIN: CPT | Performed by: NURSE PRACTITIONER

## 2025-06-23 PROCEDURE — 3075F SYST BP GE 130 - 139MM HG: CPT | Performed by: NURSE PRACTITIONER

## 2025-06-23 RX ORDER — LISDEXAMFETAMINE DIMESYLATE 10 MG/1
10 CAPSULE ORAL EVERY MORNING
Qty: 30 CAPSULE | Refills: 0 | Status: SHIPPED | OUTPATIENT
Start: 2025-06-23

## 2025-06-23 ASSESSMENT — PAIN SCALES - GENERAL: PAINLEVEL_OUTOF10: NO PAIN (0)

## 2025-06-23 NOTE — PATIENT INSTRUCTIONS
Schedule the 24 hour blood pressure cuff  Possible outcomes  1) wide spread of numbers --> kidney US follow-up, start neither at BP medication or the stimulant  2) normal blood pressures --> start Vyvanse and monitor, follow-up in one month after starting Vyvyanse  3) hypertension stage 2 (140s/90s) --> start olmesartan 10 mg, send home BPs in two weeks to see if 20 mg is needed, start Vyvanse after BPs controlled  4) hypertension stage 1 (130's/high 80s) --> start Vyvanse 10 mg, monitor BP at home, follow-up in one month and determine effect of Vyvyanse and where BP is on the med

## 2025-06-23 NOTE — PROGRESS NOTES
Assessment & Plan     Elevated blood pressure reading without diagnosis of hypertension  Based on previous home BPs, was expecting to start blood pressure medication today, but patient has had a home BP in 90/60s recently. Discussed clarifying diagnosis with 24 hour BP measurements and determining next steps both for BP management and ADHD medication dependent on that  - 24 Hour Blood Pressure Monitor - Adult; Future  - 24 Hour Blood Pressure Monitor - Adult; Future    Major depressive disorder, recurrent episode, moderate (H)  Stable on venlafaxine    ZOILA (generalized anxiety disorder)  Stable on venlafaxine    Attention deficit hyperactivity disorder (ADHD), combined type  CSA done in the fall. We had discussed meds prior and opted for vyvanse, but delayed start due to blood pressures. Discussed stepwise plan for determining if HTN and starting vyvanse - see below  - lisdexamfetamine (VYVANSE) 10 MG capsule; Take 1 capsule (10 mg) by mouth every morning.    Elevated hemoglobin  Resolved and no further hematology consult needed    Class 1 obesity with body mass index (BMI) of 33.0 to 33.9 in adult, unspecified obesity type, unspecified whether serious comorbidity present  Discussed patient's weight concerns. Does have room for increased exercise and changes in diet. We are hoping for some appetite suppression with the Vyvanse that might allow for better nutrition choices. We could also consider topiramate. Would not reach for bupropion with BPs at this time or phentermine with stimulant start for ADHD and BPs.       Patient Instructions   Schedule the 24 hour blood pressure cuff  Possible outcomes  1) wide spread of numbers --> kidney US follow-up, start neither at BP medication or the stimulant  2) normal blood pressures --> start Vyvanse and monitor, follow-up in one month after starting Vyvyanse  3) hypertension stage 2 (140s/90s) --> start olmesartan 10 mg, send home BPs in two weeks to see if 20 mg is  needed, start Vyvanse after BPs controlled  4) hypertension stage 1 (130's/high 80s) --> start Vyvanse 10 mg, monitor BP at home, follow-up in one month and determine effect of Vyvyanse and where BP is on the med            The longitudinal plan of care for the diagnosis(es)/condition(s) as documented were addressed during this visit. Due to the added complexity in care, I will continue to support Dominic in the subsequent management and with ongoing continuity of care.Review of prior external note(s) from - hematology  Ordering of each unique test  Prescription drug management  I spent a total of 40 minutes on the day of the visit.   Time spent by me today doing chart review, history and exam, documentation and further activities per the note    Subjective   Dominic is a 30 year old, presenting for the following health issues:  Recheck Medication and Hypertension        6/23/2025     2:01 PM   Additional Questions   Roomed by Gladys LYNNE     History of Present Illness       Hypertension: He presents for follow up of hypertension.  He does check blood pressure  regularly outside of the clinic. Outpatient blood pressures have not been over 140/90. He does not follow a low salt diet.     He eats 0-1 servings of fruits and vegetables daily.He consumes 2 sweetened beverage(s) daily.He exercises with enough effort to increase his heart rate 10 to 19 minutes per day.  He exercises with enough effort to increase his heart rate 4 days per week.   He is taking medications regularly.        HTN - Home blood pressures have read 130's/80-90s in April and June. Recently within the last week had a reading of 99/74.     BP Readings from Last 6 Encounters:   06/23/25 138/88   05/08/25 138/89   03/11/25 (!) 139/91   09/03/24 (!) 138/110   04/19/19 126/82   01/11/19 138/84     ADHD - we had previously discussedVyans as best option. CSA completed    No headaches, vision changes, dizziness, SOB, chest pain, irregular heartbeat, urine  "changes, peripheral edema.     Saw hematology and elevated hgb determined to be dehydration. Labs confirmed diagnosis. No scheduled follow-up needed    Wt Readings from Last 5 Encounters:   06/23/25 107 kg (236 lb)   05/08/25 107.5 kg (237 lb)   03/11/25 106.4 kg (234 lb 8 oz)   09/03/24 110.5 kg (243 lb 8 oz)   04/19/19 89 kg (196 lb 1.6 oz)     Body mass index is 33.71 kg/m .        Review of Systems  Constitutional, HEENT, cardiovascular, pulmonary, gi and gu systems are negative, except as otherwise noted.      Objective    /88   Pulse 73   Temp 97.1  F (36.2  C) (Temporal)   Resp 18   Ht 1.782 m (5' 10.16\")   Wt 107 kg (236 lb)   SpO2 96%   BMI 33.71 kg/m    Body mass index is 33.71 kg/m .  Physical Exam   GENERAL: alert and no distress  NECK: no adenopathy, no asymmetry, masses, or scars  RESP: lungs clear to auscultation - no rales, rhonchi or wheezes  CV: regular rate and rhythm, normal S1 S2, no S3 or S4, no murmur, click or rub, no peripheral edema   ABDOMEN: soft, nontender, no hepatosplenomegaly, no masses and bowel sounds normal  PSYCH: mentation appears normal, affect normal/bright            Signed Electronically by: ZARA Brand CNP    "